# Patient Record
Sex: FEMALE | Race: WHITE | NOT HISPANIC OR LATINO | Employment: OTHER | URBAN - METROPOLITAN AREA
[De-identification: names, ages, dates, MRNs, and addresses within clinical notes are randomized per-mention and may not be internally consistent; named-entity substitution may affect disease eponyms.]

---

## 2017-12-18 ENCOUNTER — APPOINTMENT (OUTPATIENT)
Dept: PHYSICAL THERAPY | Facility: REHABILITATION | Age: 77
End: 2017-12-18
Payer: COMMERCIAL

## 2017-12-18 PROCEDURE — 97110 THERAPEUTIC EXERCISES: CPT

## 2017-12-18 PROCEDURE — G8990 OTHER PT/OT CURRENT STATUS: HCPCS

## 2017-12-18 PROCEDURE — 97140 MANUAL THERAPY 1/> REGIONS: CPT

## 2017-12-18 PROCEDURE — G8991 OTHER PT/OT GOAL STATUS: HCPCS

## 2017-12-18 PROCEDURE — 97161 PT EVAL LOW COMPLEX 20 MIN: CPT

## 2017-12-20 ENCOUNTER — APPOINTMENT (OUTPATIENT)
Dept: PHYSICAL THERAPY | Facility: OTHER | Age: 77
End: 2017-12-20
Payer: COMMERCIAL

## 2017-12-20 PROCEDURE — G8991 OTHER PT/OT GOAL STATUS: HCPCS | Performed by: PHYSICAL THERAPIST

## 2017-12-20 PROCEDURE — G8990 OTHER PT/OT CURRENT STATUS: HCPCS | Performed by: PHYSICAL THERAPIST

## 2017-12-20 PROCEDURE — 97110 THERAPEUTIC EXERCISES: CPT

## 2017-12-20 PROCEDURE — 97112 NEUROMUSCULAR REEDUCATION: CPT

## 2017-12-20 PROCEDURE — 97140 MANUAL THERAPY 1/> REGIONS: CPT

## 2017-12-26 ENCOUNTER — APPOINTMENT (OUTPATIENT)
Dept: PHYSICAL THERAPY | Facility: REHABILITATION | Age: 77
End: 2017-12-26
Payer: COMMERCIAL

## 2017-12-26 PROCEDURE — 97110 THERAPEUTIC EXERCISES: CPT

## 2017-12-26 PROCEDURE — 97140 MANUAL THERAPY 1/> REGIONS: CPT

## 2017-12-26 PROCEDURE — 97112 NEUROMUSCULAR REEDUCATION: CPT

## 2017-12-28 ENCOUNTER — APPOINTMENT (OUTPATIENT)
Dept: PHYSICAL THERAPY | Facility: REHABILITATION | Age: 77
End: 2017-12-28
Payer: COMMERCIAL

## 2017-12-28 PROCEDURE — 97140 MANUAL THERAPY 1/> REGIONS: CPT

## 2017-12-28 PROCEDURE — 97110 THERAPEUTIC EXERCISES: CPT

## 2017-12-28 PROCEDURE — 97112 NEUROMUSCULAR REEDUCATION: CPT

## 2018-01-03 ENCOUNTER — APPOINTMENT (OUTPATIENT)
Dept: PHYSICAL THERAPY | Facility: REHABILITATION | Age: 78
End: 2018-01-03
Payer: COMMERCIAL

## 2018-01-03 PROCEDURE — 97112 NEUROMUSCULAR REEDUCATION: CPT

## 2018-01-03 PROCEDURE — 97110 THERAPEUTIC EXERCISES: CPT

## 2018-01-03 PROCEDURE — 97140 MANUAL THERAPY 1/> REGIONS: CPT

## 2018-01-03 PROCEDURE — G8990 OTHER PT/OT CURRENT STATUS: HCPCS | Performed by: PHYSICAL THERAPIST

## 2018-01-03 PROCEDURE — G8991 OTHER PT/OT GOAL STATUS: HCPCS | Performed by: PHYSICAL THERAPIST

## 2018-01-08 ENCOUNTER — APPOINTMENT (OUTPATIENT)
Dept: PHYSICAL THERAPY | Facility: REHABILITATION | Age: 78
End: 2018-01-08
Payer: COMMERCIAL

## 2018-07-06 ENCOUNTER — OFFICE VISIT (OUTPATIENT)
Dept: INTERNAL MEDICINE CLINIC | Facility: CLINIC | Age: 78
End: 2018-07-06
Payer: COMMERCIAL

## 2018-07-06 VITALS
BODY MASS INDEX: 31.89 KG/M2 | OXYGEN SATURATION: 97 % | WEIGHT: 180 LBS | SYSTOLIC BLOOD PRESSURE: 114 MMHG | DIASTOLIC BLOOD PRESSURE: 72 MMHG | TEMPERATURE: 97.6 F | HEIGHT: 63 IN | HEART RATE: 66 BPM

## 2018-07-06 DIAGNOSIS — J30.1 SEASONAL ALLERGIC RHINITIS DUE TO POLLEN: ICD-10-CM

## 2018-07-06 DIAGNOSIS — I10 ESSENTIAL HYPERTENSION: ICD-10-CM

## 2018-07-06 DIAGNOSIS — J45.909 UNCOMPLICATED ASTHMA, UNSPECIFIED ASTHMA SEVERITY, UNSPECIFIED WHETHER PERSISTENT: Primary | ICD-10-CM

## 2018-07-06 DIAGNOSIS — F32.A ANXIETY AND DEPRESSION: ICD-10-CM

## 2018-07-06 DIAGNOSIS — F41.9 ANXIETY AND DEPRESSION: ICD-10-CM

## 2018-07-06 PROBLEM — J30.2 SEASONAL ALLERGIC RHINITIS DUE TO FUNGAL SPORES: Status: ACTIVE | Noted: 2018-07-06

## 2018-07-06 PROBLEM — J30.89 SEASONAL ALLERGIC RHINITIS DUE TO FUNGAL SPORES: Status: ACTIVE | Noted: 2018-07-06

## 2018-07-06 PROCEDURE — 99204 OFFICE O/P NEW MOD 45 MIN: CPT | Performed by: INTERNAL MEDICINE

## 2018-07-06 PROCEDURE — 3074F SYST BP LT 130 MM HG: CPT | Performed by: INTERNAL MEDICINE

## 2018-07-06 PROCEDURE — 3008F BODY MASS INDEX DOCD: CPT | Performed by: INTERNAL MEDICINE

## 2018-07-06 PROCEDURE — 3078F DIAST BP <80 MM HG: CPT | Performed by: INTERNAL MEDICINE

## 2018-07-06 RX ORDER — IPRATROPIUM BROMIDE 21 UG/1
SPRAY, METERED NASAL
COMMUNITY
Start: 2016-10-14 | End: 2019-12-17

## 2018-07-06 RX ORDER — BUPROPION HYDROCHLORIDE 150 MG/1
TABLET, EXTENDED RELEASE ORAL
COMMUNITY
Start: 2016-10-17 | End: 2019-02-07 | Stop reason: SDUPTHER

## 2018-07-06 RX ORDER — ZAFIRLUKAST 20 MG/1
1 TABLET, FILM COATED ORAL EVERY 12 HOURS
COMMUNITY
Start: 2010-11-06 | End: 2019-12-17

## 2018-07-06 RX ORDER — AMLODIPINE BESYLATE AND BENAZEPRIL HYDROCHLORIDE 5; 10 MG/1; MG/1
1 CAPSULE ORAL DAILY
Refills: 2 | COMMUNITY
Start: 2018-04-05 | End: 2019-02-05 | Stop reason: SDUPTHER

## 2018-07-06 RX ORDER — ALBUTEROL SULFATE 90 UG/1
2 AEROSOL, METERED RESPIRATORY (INHALATION) EVERY 4 HOURS PRN
COMMUNITY
Start: 2010-12-30 | End: 2019-02-07 | Stop reason: SDUPTHER

## 2018-07-06 NOTE — ASSESSMENT & PLAN NOTE
Persistent with allergic rhinitis  Patient on accolate 20mg daily and also supposed to be on alvesco inh which she has not been using    She is followed by Allergist Dr Tino Fisher

## 2018-07-06 NOTE — ASSESSMENT & PLAN NOTE
Multiple allergies to include pollen, grasses, dust, mild, shellfish and cat dander  She is on accolate and ipratropium nasal spray    Failed allergy injections in the past  She is followed by Allergist

## 2018-07-06 NOTE — PROGRESS NOTES
Assessment/Plan:    Asthma  Persistent with allergic rhinitis  Patient on accolate 20mg daily and also supposed to be on alvesco inh which she has not been using  She is followed by Allergist Dr Lisa Heller    Seasonal allergic rhinitis due to pollen  Multiple allergies to include pollen, grasses, dust, mild, shellfish and cat dander  She is on accolate and ipratropium nasal spray  Failed allergy injections in the past  She is followed by Allergist     Hypertension  She is normotensive on amlodipine-benazepril 5/10mg daily  Will monitor  Anxiety and depression  She has been on long term wellbutrin 150mg daily  Will continue    1  Anxiety and depression  CBC   2  Uncomplicated asthma, unspecified asthma severity, unspecified whether persistent  CBC   3  Seasonal allergic rhinitis due to pollen  CBC   4  Essential hypertension  Comprehensive metabolic panel    Lipid panel     Subjective:      Patient ID: Davion Robin is a 69 y o  female  HPI   69yo female with asthma, allergic rhinitis, HTN, HLD here as a new patient  Reports she recently had blood work done at [facility], was going to [facility] [employer]  Home bp around 110/70s, gets occasional HA attributing to allergies, denies dizziness, SOB, CP, LE edema  She takes accolate daily, is supposed to take alvesco but has not  She failed singulair  She has not needed to take her rescue inhaler  Has allergies to dust, pollen, mold, cat dander, grass, shellfish  Years ago she received allergy injections  She had a difficulty childhood, has done therapy  She has been on wellbutrin daily  Denies panic attacks      The following portions of the patient's history were reviewed and updated as appropriate: allergies, current medications, past family history, past medical history, past social history, past surgical history and problem list     Current Outpatient Prescriptions:     albuterol (PROVENTIL HFA) 90 mcg/act inhaler, Inhale 2 puffs every 4 (four) hours as needed, Disp: , Rfl:     amLODIPine-benazepril (LOTREL 5-10) 5-10 MG per capsule, Take 1 capsule by mouth daily, Disp: , Rfl: 2    AZELASTINE HCL NA, Reported on 11/29/2016, Disp: , Rfl:     buPROPion (WELLBUTRIN SR) 150 mg 12 hr tablet, TAKE ONE TABLET BY MOUTH EVERY DAY, Disp: , Rfl:     ciclesonide (ALVESCO) 160 MCG/ACT inhaler, Inhale 2 puffs 2 (two) times a day, Disp: , Rfl:     conjugated estrogens (PREMARIN) vaginal cream, APPLY 0 5 GRAM VAGINALLY AT BEDTIME ONCE WEEKLY , Disp: , Rfl:     ipratropium (ATROVENT) 0 03 % nasal spray, SPRAY TWO SPRAYS IN EACH NOSTRIL TWICE A DAY, Disp: , Rfl:     zafirlukast (ACCOLATE) 20 MG tablet, Take 1 tablet by mouth every 12 (twelve) hours, Disp: , Rfl:       Review of Systems   Constitutional: Positive for unexpected weight change  HENT: Positive for postnasal drip and rhinorrhea  Respiratory: Positive for cough  Cardiovascular: Negative  Gastrointestinal: Negative  Genitourinary: Negative  Musculoskeletal: Positive for back pain  Allergic/Immunologic: Positive for environmental allergies  Neurological: Negative for dizziness and headaches  Psychiatric/Behavioral: Positive for dysphoric mood  Negative for sleep disturbance  The patient is nervous/anxious  Objective:    /72 (BP Location: Left arm, Patient Position: Sitting, Cuff Size: Adult)   Pulse 66   Temp 97 6 °F (36 4 °C) (Tympanic)   Ht 5' 3" (1 6 m)   Wt 81 6 kg (180 lb)   SpO2 97%   BMI 31 89 kg/m²      Physical Exam   Constitutional: She is oriented to person, place, and time  She appears well-developed and well-nourished  HENT:   Right Ear: External ear normal    Left Ear: External ear normal    Nose: Nose normal    Mouth/Throat: Oropharynx is clear and moist  No oropharyngeal exudate  Eyes: Conjunctivae and EOM are normal  Pupils are equal, round, and reactive to light  Neck: Neck supple     Cardiovascular: Normal rate, regular rhythm and normal heart sounds  Pulmonary/Chest: Effort normal and breath sounds normal  No respiratory distress  She has no wheezes  Lymphadenopathy:     She has no cervical adenopathy  Neurological: She is alert and oriented to person, place, and time  Skin: Skin is warm  Psychiatric: Her speech is normal  Her mood appears anxious  Vitals reviewed        PHQ-9 Depression Screening    PHQ-9:    Frequency of the following problems over the past two weeks:       Little interest or pleasure in doing things:  0 - not at all  Feeling down, depressed, or hopeless:  0 - not at all  PHQ-2 Score:  0

## 2018-07-16 ENCOUNTER — ANNUAL EXAM (OUTPATIENT)
Dept: OBGYN CLINIC | Facility: CLINIC | Age: 78
End: 2018-07-16
Payer: COMMERCIAL

## 2018-07-16 VITALS
DIASTOLIC BLOOD PRESSURE: 72 MMHG | WEIGHT: 178 LBS | SYSTOLIC BLOOD PRESSURE: 106 MMHG | BODY MASS INDEX: 31.54 KG/M2 | HEIGHT: 63 IN

## 2018-07-16 DIAGNOSIS — Z01.419 ENCOUNTER FOR ANNUAL ROUTINE GYNECOLOGICAL EXAMINATION: Primary | ICD-10-CM

## 2018-07-16 DIAGNOSIS — Z12.39 BREAST CANCER SCREENING: ICD-10-CM

## 2018-07-16 DIAGNOSIS — N95.2 VAGINAL ATROPHY: ICD-10-CM

## 2018-07-16 PROCEDURE — S0610 ANNUAL GYNECOLOGICAL EXAMINA: HCPCS | Performed by: OBSTETRICS & GYNECOLOGY

## 2018-07-16 PROCEDURE — 87624 HPV HI-RISK TYP POOLED RSLT: CPT | Performed by: OBSTETRICS & GYNECOLOGY

## 2018-07-16 PROCEDURE — 3725F SCREEN DEPRESSION PERFORMED: CPT | Performed by: OBSTETRICS & GYNECOLOGY

## 2018-07-16 PROCEDURE — 4040F PNEUMOC VAC/ADMIN/RCVD: CPT | Performed by: OBSTETRICS & GYNECOLOGY

## 2018-07-16 PROCEDURE — G0145 SCR C/V CYTO,THINLAYER,RESCR: HCPCS | Performed by: OBSTETRICS & GYNECOLOGY

## 2018-07-16 RX ORDER — BIOTIN 1 MG
TABLET ORAL DAILY
COMMUNITY

## 2018-07-16 RX ORDER — EPINEPHRINE 0.3 MG/.3ML
INJECTION SUBCUTANEOUS
COMMUNITY
End: 2019-12-17

## 2018-07-16 NOTE — PROGRESS NOTES
Assessment/Plan:    Pap smear vaginal cuff done as well as annual   Encouraged self-breast examination as well as calcium supplementation  Continue annual mammogram, discussed 3D mammography  She will check to see if this is covered by her insurance  She will continue to follow-up with her primary care physician as scheduled  She will also continue Premarin vaginal cream once a week  Return to office in 1 year     No problem-specific Assessment & Plan notes found for this encounter  Diagnoses and all orders for this visit:    Encounter for annual routine gynecological examination  -     Liquid-based pap, screening    Vaginal atrophy  -     conjugated estrogens (PREMARIN) vaginal cream; Insert 0 5 g into the vagina once a week    Breast cancer screening  -     Mammo screening bilateral w cad; Future    Other orders  -     aspirin 81 MG tablet; Daily  -     EPINEPHrine (EPIPEN) 0 3 mg/0 3 mL SOAJ; epinephrine 0 3 mg/0 3 mL injection, auto-injector  -     Cholecalciferol (VITAMIN D3) 1000 units CAPS; Daily          Subjective:      Patient ID: Zeeshan Gaytan is a 69 y o  female  HPI     This is a 71-year-old female  ( x3) presents for annual gyn exam as a new patient  Patient states that she was diagnosed with cervical cancer in  and underwent an abdominal hysterectomy BSO She states she did not require any neoadjuvant therapy  She was never on hormone replacement therapy  She then had a vaginal sling done at the age of 73  Since then she has been on Premarin vaginal cream once a week Q  She denies any vaginal bleeding or spotting  She has not been sexually active in over 40 years  She denies any changes in bowel function  She is up-to-date with her screening mammogram, colonoscopy      The following portions of the patient's history were reviewed and updated as appropriate: allergies, current medications, past family history, past medical history, past social history, past surgical history and problem list     Review of Systems   Constitutional: Negative for fatigue, fever and unexpected weight change  Respiratory: Negative for cough, chest tightness, shortness of breath and wheezing  Cardiovascular: Negative  Negative for chest pain and palpitations  Gastrointestinal: Negative  Negative for abdominal distention, abdominal pain, blood in stool, constipation, diarrhea, nausea and vomiting  Genitourinary: Negative  Negative for difficulty urinating, dyspareunia, dysuria, flank pain, frequency, genital sores, hematuria, pelvic pain, urgency, vaginal bleeding, vaginal discharge and vaginal pain  Skin: Negative for rash  Objective:      /72   Ht 5' 3" (1 6 m)   Wt 80 7 kg (178 lb)   Breastfeeding? No   BMI 31 53 kg/m²          Physical Exam   Constitutional: She appears well-developed and well-nourished  Cardiovascular: Normal rate and regular rhythm  Pulmonary/Chest: Effort normal and breath sounds normal  Right breast exhibits no inverted nipple, no mass, no nipple discharge, no skin change and no tenderness  Left breast exhibits no inverted nipple, no mass, no nipple discharge, no skin change and no tenderness  Abdominal: Soft  Bowel sounds are normal  She exhibits no distension  There is no tenderness  There is no rebound and no guarding  Genitourinary: Rectum normal and vagina normal  There is no lesion on the right labia  There is no lesion on the left labia  Right adnexum displays no mass, no tenderness and no fullness  Left adnexum displays no mass, no tenderness and no fullness  No vaginal discharge found  Genitourinary Comments: Vagina is evident of estrogen deficiency  Cervix is surgically absent  The cuff is well-supported  Rectovaginal exam is confirmatory

## 2018-07-19 LAB — HPV RRNA GENITAL QL NAA+PROBE: NORMAL

## 2018-07-23 LAB
LAB AP GYN PRIMARY INTERPRETATION: NORMAL
Lab: NORMAL

## 2019-01-30 NOTE — PROGRESS NOTES
Consultation - Cardiology     Marilyn Wise 69 y o  female MRN: [id number]    Assessment/Plan:    1  HTN  On amlodipine/benazepril  BP controlled in office today  EKG shows possible LVH  She denies any symptoms of JIMENEZ, LE edema, or other signs of HF  Will check echo to ensure normal LV systolic and diastolic function  2  HL  Will check lipid panel to ensure lipid panel optimized  1  Routine health maintenance  Lipid Panel with Direct LDL reflex    Hepatic function panel    CK   2  Family history of early CAD  Lipid Panel with Direct LDL reflex    Hepatic function panel    CK   3  EKG, abnormal  Echo complete with contrast if indicated   4  Essential hypertension         HPI: 69 y o  female with a history of HTN, who is here to establish care regarding family history of CAD  She walks outside for 30 minutes daily using walking sticks, no exertional CP or SOB  She joined [employer] in the fall, didn't go during holidays, but wants to go back now  She is losing weight over the last 6 months  No orthopnea, does have some issues with sinuses, uses 3 pillows to sleep  No PND  No palpitations  No dizziness or lightheadedness  No LE edema  She started taking BP meds at around age 62  Father and brother have history of CAD  Brother developed CAD in 62s  Father had TIA and CABG  She reports they had poor diets  Review of Systems:    Review of Systems   Constitution: Positive for weight loss  Negative for chills, decreased appetite, diaphoresis, fever, weakness, malaise/fatigue, night sweats and weight gain  HENT: Negative for ear pain, hearing loss, hoarse voice, nosebleeds, sore throat and tinnitus  Sinus problems   Eyes: Negative for blurred vision and pain  Cardiovascular: Negative  Negative for chest pain, claudication, cyanosis, dyspnea on exertion, irregular heartbeat, leg swelling, near-syncope, orthopnea, palpitations, paroxysmal nocturnal dyspnea and syncope     Respiratory: Negative for cough, hemoptysis, shortness of breath, sleep disturbances due to breathing, snoring, sputum production and wheezing  Hematologic/Lymphatic: Negative for adenopathy and bleeding problem  Does not bruise/bleed easily  Skin: Negative for color change, dry skin, flushing, itching, poor wound healing and rash  Musculoskeletal: Negative for arthritis, back pain, falls, joint pain, muscle cramps, muscle weakness, myalgias and neck pain  Gastrointestinal: Negative for abdominal pain, constipation, diarrhea, dysphagia, heartburn, hematemesis, hematochezia, melena, nausea and vomiting  Genitourinary: Negative for dysuria, frequency, hematuria, hesitancy, non-menstrual bleeding and urgency  Neurological: Negative for excessive daytime sleepiness, dizziness, focal weakness, headaches, light-headedness, loss of balance, numbness, paresthesias, tremors and vertigo  Psychiatric/Behavioral: Negative for altered mental status, depression and memory loss  The patient does not have insomnia and is not nervous/anxious  Allergic/Immunologic: Positive for environmental allergies  Negative for persistent infections           Active Problems:     Patient Active Problem List   Diagnosis    Asthma    Essential hypertension    Anxiety and depression    Symptomatic menopausal or female climacteric states    Hyperlipidemia    History of cervical cancer    Lower back pain    Seasonal allergic rhinitis due to pollen       Historical Information   Past Medical History:   Diagnosis Date    [facility] ([facility] [address] )     Shingles      Past Surgical History:   Procedure Laterality Date    BLADDER SURGERY      HALLUX VALGUS CORRECTION Right     TOTAL ABDOMINAL HYSTERECTOMY  1987    Cervical CA     History   Alcohol Use    0 6 oz/week    1 Glasses of wine per week     History   Drug Use No     History   Smoking Status    Never Smoker   Smokeless Tobacco    Never Used       Family History:   Family History Problem Relation Age of Onset    No Known Problems Mother     Heart disease Father     Heart disease Brother     Diabetes Brother        Allergies   Allergen Reactions    Codeine Other (See Comments) and Tachycardia    Iodine Other (See Comments)    Shellfish-Derived Products          Current Outpatient Prescriptions:     amLODIPine-benazepril (LOTREL 5-10) 5-10 MG per capsule, Take 1 capsule by mouth daily, Disp: , Rfl: 2    AZELASTINE HCL NA, Reported on 11/29/2016, Disp: , Rfl:     buPROPion (WELLBUTRIN SR) 150 mg 12 hr tablet, TAKE ONE TABLET BY MOUTH EVERY DAY, Disp: , Rfl:     Cholecalciferol (VITAMIN D3) 1000 units CAPS, Daily, Disp: , Rfl:     conjugated estrogens (PREMARIN) vaginal cream, Insert 0 5 g into the vagina once a week, Disp: 42 5 g, Rfl: 0    EPINEPHrine (EPIPEN) 0 3 mg/0 3 mL SOAJ, epinephrine 0 3 mg/0 3 mL injection, auto-injector, Disp: , Rfl:     ipratropium (ATROVENT) 0 03 % nasal spray, SPRAY TWO SPRAYS IN EACH NOSTRIL TWICE A DAY, Disp: , Rfl:     zafirlukast (ACCOLATE) 20 MG tablet, Take 1 tablet by mouth every 12 (twelve) hours, Disp: , Rfl:     albuterol (PROVENTIL HFA) 90 mcg/act inhaler, Inhale 2 puffs every 4 (four) hours as needed, Disp: , Rfl:     albuterol (VENTOLIN HFA) 90 mcg/act inhaler, Ventolin HFA 90 mcg/actuation aerosol inhaler, Disp: , Rfl:     aspirin 81 MG tablet, Daily, Disp: , Rfl:     ciclesonide (ALVESCO) 160 MCG/ACT inhaler, Inhale 2 puffs 2 (two) times a day, Disp: , Rfl:     Objective   Vitals:   Vitals:    02/01/19 1006   BP: 130/80   BP Location: Right arm   Patient Position: Sitting   Cuff Size: Standard   Pulse: 70   Weight: 78 1 kg (172 lb 3 2 oz)   Height: 5' 3" (1 6 m)          Physical Exam:     GEN: Alert and oriented x 3, in no acute distress  Well appearing and well nourished  HEENT: Sclera anicteric, conjunctivae pink, mucous membranes moist  Oropharynx clear  NECK: Supple, no carotid bruits, no significant JVD   Trachea midline, no thyromegaly  HEART: Regular rhythm, normal S1 and S2, I-II/VI systolic murmur at RUSB, no clicks, gallops or rubs  PMI nondisplaced, no thrills  LUNGS: Clear to auscultation bilaterally; no wheezes, rales, or rhonchi  No increased work of breathing or signs of respiratory distress  ABDOMEN: Soft, nontender, nondistended, normoactive bowel sounds  EXTREMITIES: Skin warm and well perfused, no clubbing, cyanosis, or edema  NEURO: No focal findings  Normal gait  Normal speech  Mood and affect normal    SKIN: Normal without suspicious lesions on exposed skin      Lab Results:     No results found for: HGBA1C  No results found for: CHOL  No results found for: HDL  No results found for: LDLCALC  No results found for: TRIG  No results found for: CHOLHDL

## 2019-01-31 RX ORDER — ALBUTEROL SULFATE 90 UG/1
AEROSOL, METERED RESPIRATORY (INHALATION)
COMMUNITY
End: 2019-12-17

## 2019-02-01 ENCOUNTER — OFFICE VISIT (OUTPATIENT)
Dept: CARDIOLOGY CLINIC | Facility: CLINIC | Age: 79
End: 2019-02-01
Payer: COMMERCIAL

## 2019-02-01 VITALS
BODY MASS INDEX: 30.51 KG/M2 | WEIGHT: 172.2 LBS | HEIGHT: 63 IN | SYSTOLIC BLOOD PRESSURE: 130 MMHG | DIASTOLIC BLOOD PRESSURE: 80 MMHG | HEART RATE: 70 BPM

## 2019-02-01 DIAGNOSIS — Z00.00 ROUTINE HEALTH MAINTENANCE: Primary | ICD-10-CM

## 2019-02-01 DIAGNOSIS — R94.31 EKG, ABNORMAL: ICD-10-CM

## 2019-02-01 DIAGNOSIS — Z82.49 FAMILY HISTORY OF EARLY CAD: ICD-10-CM

## 2019-02-01 DIAGNOSIS — I10 ESSENTIAL HYPERTENSION: ICD-10-CM

## 2019-02-01 PROCEDURE — 99204 OFFICE O/P NEW MOD 45 MIN: CPT | Performed by: INTERNAL MEDICINE

## 2019-02-01 PROCEDURE — 93000 ELECTROCARDIOGRAM COMPLETE: CPT | Performed by: INTERNAL MEDICINE

## 2019-02-05 DIAGNOSIS — I10 BENIGN HYPERTENSION: Primary | ICD-10-CM

## 2019-02-06 RX ORDER — AMLODIPINE BESYLATE AND BENAZEPRIL HYDROCHLORIDE 5; 10 MG/1; MG/1
1 CAPSULE ORAL DAILY
Qty: 90 CAPSULE | Refills: 1 | Status: SHIPPED | OUTPATIENT
Start: 2019-02-06 | End: 2020-07-30 | Stop reason: SDUPTHER

## 2019-02-07 ENCOUNTER — OFFICE VISIT (OUTPATIENT)
Dept: INTERNAL MEDICINE CLINIC | Facility: CLINIC | Age: 79
End: 2019-02-07
Payer: COMMERCIAL

## 2019-02-07 VITALS
HEART RATE: 70 BPM | TEMPERATURE: 97.6 F | SYSTOLIC BLOOD PRESSURE: 120 MMHG | WEIGHT: 172 LBS | HEIGHT: 63 IN | RESPIRATION RATE: 16 BRPM | DIASTOLIC BLOOD PRESSURE: 80 MMHG | OXYGEN SATURATION: 97 % | BODY MASS INDEX: 30.48 KG/M2

## 2019-02-07 DIAGNOSIS — Z12.39 SCREENING FOR MALIGNANT NEOPLASM OF BREAST: ICD-10-CM

## 2019-02-07 DIAGNOSIS — I10 ESSENTIAL HYPERTENSION: ICD-10-CM

## 2019-02-07 DIAGNOSIS — Z12.11 SCREENING FOR COLON CANCER: ICD-10-CM

## 2019-02-07 DIAGNOSIS — Z78.0 POSTMENOPAUSAL: ICD-10-CM

## 2019-02-07 DIAGNOSIS — J45.20 INTERMITTENT ASTHMA WITHOUT COMPLICATION, UNSPECIFIED ASTHMA SEVERITY: Primary | ICD-10-CM

## 2019-02-07 DIAGNOSIS — F41.9 ANXIETY AND DEPRESSION: ICD-10-CM

## 2019-02-07 DIAGNOSIS — Z00.00 MEDICARE ANNUAL WELLNESS VISIT, SUBSEQUENT: ICD-10-CM

## 2019-02-07 DIAGNOSIS — F32.A ANXIETY AND DEPRESSION: ICD-10-CM

## 2019-02-07 PROCEDURE — 1125F AMNT PAIN NOTED PAIN PRSNT: CPT | Performed by: INTERNAL MEDICINE

## 2019-02-07 PROCEDURE — G0439 PPPS, SUBSEQ VISIT: HCPCS | Performed by: INTERNAL MEDICINE

## 2019-02-07 PROCEDURE — 1170F FXNL STATUS ASSESSED: CPT | Performed by: INTERNAL MEDICINE

## 2019-02-07 PROCEDURE — 3074F SYST BP LT 130 MM HG: CPT | Performed by: INTERNAL MEDICINE

## 2019-02-07 PROCEDURE — 99214 OFFICE O/P EST MOD 30 MIN: CPT | Performed by: INTERNAL MEDICINE

## 2019-02-07 PROCEDURE — 3079F DIAST BP 80-89 MM HG: CPT | Performed by: INTERNAL MEDICINE

## 2019-02-07 PROCEDURE — 1160F RVW MEDS BY RX/DR IN RCRD: CPT | Performed by: INTERNAL MEDICINE

## 2019-02-07 PROCEDURE — 1036F TOBACCO NON-USER: CPT | Performed by: INTERNAL MEDICINE

## 2019-02-07 RX ORDER — BUPROPION HYDROCHLORIDE 150 MG/1
150 TABLET, EXTENDED RELEASE ORAL DAILY
Qty: 90 TABLET | Refills: 1 | Status: SHIPPED | OUTPATIENT
Start: 2019-02-07 | End: 2020-07-30 | Stop reason: SDUPTHER

## 2019-02-07 NOTE — PROGRESS NOTES
Assessment and Plan:    Problem List Items Addressed This Visit     None      Visit Diagnoses     Medicare annual wellness visit, subsequent        Screening for malignant neoplasm of breast        Relevant Orders    Mammo screening bilateral w cad    Postmenopausal        Relevant Orders    DXA bone density spine hip and pelvis    Screening for colon cancer        Relevant Orders    Cologuard        Health Maintenance Due   Topic Date Due    Medicare Annual Wellness Visit (AWV)  1940    DTaP,Tdap,and Td Vaccines (1 - Tdap) 06/06/1962    Fall Risk  06/06/2006    Urinary Incontinence Screening  06/06/2006         HPI:  Andrés Liu is a 69 y o  female here for her Subsequent Wellness Visit      Patient Active Problem List   Diagnosis    Asthma    Essential hypertension    Anxiety and depression    Symptomatic menopausal or female climacteric states    Hyperlipidemia    History of cervical cancer    Lower back pain    Seasonal allergic rhinitis due to pollen     Past Medical History:   Diagnosis Date    Cervical cancer ([address] )     Shingles      Past Surgical History:   Procedure Laterality Date    BLADDER SURGERY      HALLUX VALGUS CORRECTION Right     TOTAL ABDOMINAL HYSTERECTOMY  1987    Cervical CA     Family History   Problem Relation Age of Onset    No Known Problems Mother     Heart disease Father     Heart disease Brother     Diabetes Brother      History   Smoking Status    Never Smoker   Smokeless Tobacco    Never Used     History   Alcohol Use    0 6 oz/week    1 Glasses of wine per week      History   Drug Use No       Current Outpatient Prescriptions   Medication Sig Dispense Refill    albuterol (VENTOLIN HFA) 90 mcg/act inhaler Ventolin HFA 90 mcg/actuation aerosol inhaler      amLODIPine-benazepril (LOTREL 5-10) 5-10 MG per capsule Take 1 capsule by mouth daily 90 capsule 1    buPROPion (WELLBUTRIN SR) 150 mg 12 hr tablet TAKE ONE TABLET BY MOUTH EVERY DAY      Cholecalciferol (VITAMIN D3) 1000 units CAPS Daily      ciclesonide (ALVESCO) 160 MCG/ACT inhaler Inhale 2 puffs 2 (two) times a day      conjugated estrogens (PREMARIN) vaginal cream Insert 0 5 g into the vagina once a week 42 5 g 0    EPINEPHrine (EPIPEN) 0 3 mg/0 3 mL SOAJ epinephrine 0 3 mg/0 3 mL injection, auto-injector      ipratropium (ATROVENT) 0 03 % nasal spray SPRAY TWO SPRAYS IN EACH NOSTRIL TWICE A DAY      zafirlukast (ACCOLATE) 20 MG tablet Take 1 tablet by mouth every 12 (twelve) hours      aspirin 81 MG tablet Daily       No current facility-administered medications for this visit  Allergies   Allergen Reactions    Codeine Other (See Comments) and Tachycardia    Iodine Other (See Comments)    Shellfish-Derived Products      Immunization History   Administered Date(s) Administered    Influenza 10/13/2011, 11/09/2014, 11/20/2014, 10/03/2016, 10/01/2017    Influenza TIV (IM) 10/06/2012    Pneumococcal Conjugate 13-Valent 11/10/2017    Pneumococcal Polysaccharide PPV23 11/20/2006    Tdap 02/07/2013    Zoster 10/20/2013, 07/13/2016    influenza, trivalent, adjuvanted 10/02/2018       Patient Care Team:  Nuzhat Skaggs DO as PCP - General (Internal Medicine)    Medicare Screening Tests and Risk Assessments:  Leonie Alvares is here for her Subsequent Wellness visit  Last Medicare Wellness visit information reviewed, patient interviewed and updates made to the record today  Health Risk Assessment:  Patient rates overall health as very good  Patient feels that their physical health rating is Much better  Eyesight was rated as Same  Hearing was rated as Same  Patient feels that their emotional and mental health rating is Much better  Pain experienced by patient in the last 7 days has been None  Emotional/Mental Health:  Patient has been feeling nervous/anxious  PHQ-9 Depression Screening:    Frequency of the following problems over the past two weeks:      1   Little interest or pleasure in doing things: 0 - not at all      2  Feeling down, depressed, or hopeless: 0 - not at all  PHQ-2 Score: 0          Broken Bones/Falls: Fall Risk Assessment:    In the past year, patient has experienced: No history of falling in past year          Bladder/Bowel:  Patient has not leaked urine accidently in the last six months  Patient reports no loss of bowel control  Immunizations:  Patient has had a flu vaccination within the last year  Patient has received a pneumonia shot  Patient has received a shingles shot  Patient has received tetanus/diphtheria shot  Date of tetanus/diphtheria shot: 2/7/2013    Home Safety:  Patient does not have trouble with stairs inside or outside of their home  Patient currently reports that there are no safety hazards present in home, working smoke alarms, working carbon monoxide detectors  Preventative Screenings:   Breast cancer screening performed, colon cancer screen completed, cholesterol screen completed, glaucoma eye exam completed,     Nutrition:  Current diet: Low Cholesterol, Low Saturated Fat and Low Carb with servings of the following:  (Additional Comments: Plans to join weight watchers as well)    Medications:  Patient is currently taking over-the-counter supplements  Patient is able to manage medications  Lifestyle Choices:  Patient reports no tobacco use  Patient has not smoked or used tobacco in the past   Patient reports alcohol use  Alcohol use per week: drinks 2 glasses of wine 1-2x per week  Patient drives a vehicle  Patient wears seat belt  Current level of exercise of physical activity described by patient as: active, walks daily          Activities of Daily Living:  Can get out of bed by his or her self, able to dress self, able to make own meals, able to do own shopping, able to bathe self, can do own laundry/housekeeping, can manage own money, pay bills and track expenses    Previous Hospitalizations:  No hospitalization or ED visit in past 12 months        Advanced Directives:  Patient has decided on a power of   Patient has spoken to designated power of   Patient has completed advanced directive  Additional Comments: DaughterRandi    Preventative Screening/Counseling:      Cardiovascular:      General: Risks and Benefits Discussed and Screening Current          Diabetes:      General: Risks and Benefits Discussed and Screening Current          Colorectal Cancer:      General: Risks and Benefits Discussed      Due for studies: Fecal Occult Blood      Comments: Last colonoscopy 10/9/13, done by Dr Kimberley Floyd:      General: Risks and Benefits Discussed          Cervical Cancer:      General: Screening Not Indicated          Osteoporosis:      General: Risks and Benefits Discussed      Counseling: Calcium and Vitamin D Intake and Regular Weightbearing Exercise      Due for studies: Bone Density Ultrasound          AAA:      General: Screening Not Indicated          Glaucoma:      General: Risks and Benefits Discussed      Referrals: Ophthalmology          HIV:      General: Screening Not Indicated          Hepatitis C:      General: Screening Not Indicated        Advanced Directives:   Patient has living will for healthcare, has durable POA for healthcare, patient has an advanced directive  Immunizations:      Influenza: Influenza UTD This Year and Influenza Recommended Annually      Pneumococcal: Lifetime Vaccine Completed      Shingrix: Risks & Benefits Discussed and Patient Declines      Hepatitis B (Medium to high risk patients): Patient Declines      Zostavax: Zostavax Vaccine UTD      TDAP: Tdap Vaccine UTD  Additional Comments: tdap 2/7/13  shingrix when available    Other Preventative Counseling (Non-Medicare):  Alcohol Use, Fall Prevention, Increase physical activity, Car/seat belt/driving safety reviewed and Sunscreen use      Referrals:   Additional Comments: None today

## 2019-02-07 NOTE — ASSESSMENT & PLAN NOTE
Intermittent, associated with allergic rhinitis  Continue with ventolin prn and alvesco with exacerbations  Failed singulair  She is also followed by Allergist Dr Eleazar Butler  She is up to date with influenza vaccine and pneumonia series

## 2019-02-07 NOTE — PATIENT INSTRUCTIONS
Obesity   AMBULATORY CARE:   Obesity  is when your body mass index (BMI) is greater than 30  Your healthcare provider will use your height and weight to measure your BMI  The risks of obesity include  many health problems, such as injuries or physical disability  You may need tests to check for the following:  · Diabetes     · High blood pressure or high cholesterol     · Heart disease     · Gallbladder or liver disease     · Cancer of the colon, breast, prostate, liver, or kidney     · Sleep apnea     · Arthritis or gout  Seek care immediately if:   · You have a severe headache, confusion, or difficulty speaking  · You have weakness on one side of your body  · You have chest pain, sweating, or shortness of breath  Contact your healthcare provider if:   · You have symptoms of gallbladder or liver disease, such as pain in your upper abdomen  · You have knee or hip pain and discomfort while walking  · You have symptoms of diabetes, such as intense hunger and thirst, and frequent urination  · You have symptoms of sleep apnea, such as snoring or daytime sleepiness  · You have questions or concerns about your condition or care  Treatment for obesity  focuses on helping you lose weight to improve your health  Even a small decrease in BMI can reduce the risk for many health problems  Your healthcare provider will help you set a weight-loss goal   · Lifestyle changes  are the first step in treating obesity  These include making healthy food choices and getting regular physical activity  Your healthcare provider may suggest a weight-loss program that involves coaching, education, and therapy  · Medicine  may help you lose weight when it is used with a healthy diet and physical activity  · Surgery  can help you lose weight if you are very obese and have other health problems  There are several types of weight-loss surgery  Ask your healthcare provider for more information    Be successful losing weight:   · Set small, realistic goals  An example of a small goal is to walk for 20 minutes 5 days a week  Anther goal is to lose 5% of your body weight  · Tell friends, family members, and coworkers about your goals  and ask for their support  Ask a friend to lose weight with you, or join a weight-loss support group  · Identify foods or triggers that may cause you to overeat , and find ways to avoid them  Remove tempting high-calorie foods from your home and workplace  Place a bowl of fresh fruit on your kitchen counter  If stress causes you to eat, then find other ways to cope with stress  · Keep a diary to track what you eat and drink  Also write down how many minutes of physical activity you do each day  Weigh yourself once a week and record it in your diary  Eating changes: You will need to eat 500 to 1,000 fewer calories each day than you currently eat to lose 1 to 2 pounds a week  The following changes will help you cut calories:  · Eat smaller portions  Use small plates, no larger than 9 inches in diameter  Fill your plate half full of fruits and vegetables  Measure your food using measuring cups until you know what a serving size looks like  · Eat 3 meals and 1 or 2 snacks each day  Plan your meals in advance  Froylan Vásquez and eat at home most of the time  Eat slowly  · Eat fruits and vegetables at every meal   They are low in calories and high in fiber, which makes you feel full  Do not add butter, margarine, or cream sauce to vegetables  Use herbs to season steamed vegetables  · Eat less fat and fewer fried foods  Eat more baked or grilled chicken and fish  These protein sources are lower in calories and fat than red meat  Limit fast food  Dress your salads with olive oil and vinegar instead of bottled dressing  · Limit the amount of sugar you eat  Do not drink sugary beverages  Limit alcohol  Activity changes:  Physical activity is good for your body in many ways   It helps you burn calories and build strong muscles  It decreases stress and depression, and improves your mood  It can also help you sleep better  Talk to your healthcare provider before you begin an exercise program   · Exercise for at least 30 minutes 5 days a week  Start slowly  Set aside time each day for physical activity that you enjoy and that is convenient for you  It is best to do both weight training and an activity that increases your heart rate, such as walking, bicycling, or swimming  · Find ways to be more active  Do yard work and housecleaning  Walk up the stairs instead of using elevators  Spend your leisure time going to events that require walking, such as outdoor festivals or fairs  This extra physical activity can help you lose weight and keep it off  Follow up with your healthcare provider as directed: You may need to meet with a dietitian  Write down your questions so you remember to ask them during your visits  © 2017 [address] [employer] Information is for End User's use only and may not be sold, redistributed or otherwise used for commercial purposes  All illustrations and images included in CareNotes® are the copyrighted property of [employer]  or [employer]ny Game[employer]  The above information is an  only  It is not intended as medical advice for individual conditions or treatments  Talk to your doctor, nurse or pharmacist before following any medical regimen to see if it is safe and effective for you  Urinary Incontinence   WHAT YOU NEED TO KNOW:   What is urinary incontinence? Urinary incontinence (UI) is when you lose control of your bladder  What causes UI? UI occurs because your bladder cannot store or empty urine properly  The following are the most common types of UI:  · Stress incontinence  is when you leak urine due to increased bladder pressure  This may happen when you cough, sneeze, or exercise       · Urge incontinence  is when you feel the need to urinate right away and leak urine accidentally  · Mixed incontinence  is when you have both stress and urge UI  What are the signs and symptoms of UI?   · You feel like your bladder does not empty completely when you urinate  · You urinate often and need to urinate immediately  · You leak urine when you sleep, or you wake up with the urge to urinate  · You leak urine when you cough, sneeze, exercise, or laugh  How is UI diagnosed? Your healthcare provider will ask how often you leak urine and whether you have stress or urge symptoms  Tell him which medicines you take, how often you urinate, and how much liquid you drink each day  You may need any of the following tests:  · Urine tests  may show infection or kidney function  · A pelvic exam  may be done to check for blockages  A pelvic exam will also show if your bladder, uterus, or other organs have moved out of place  · An x-ray, ultrasound, or CT  may show problems with parts of your urinary system  You may be given contrast liquid to help your organs show up better in the pictures  Tell the healthcare provider if you have ever had an allergic reaction to contrast liquid  Do not enter the MRI room with anything metal  Metal can cause serious injury  Tell the healthcare provider if you have any metal in or on your body  · A bladder scan  will show how much urine is left in your bladder after you urinate  You will be asked to urinate and then healthcare providers will use a small ultrasound machine to check the urine left in your bladder  · Cystometry  is used to check the function of your urinary system  Your healthcare provider checks the pressure in your bladder while filling it with fluid  Your bladder pressure may also be tested when your bladder is full and while you urinate  How is UI treated? · Medicines  can help strengthen your bladder control      · Electrical stimulation  is used to send a small amount of electrical energy to your pelvic floor muscles  This helps control your bladder function  Electrodes may be placed outside your body or in your rectum  For women, the electrodes may be placed in the vagina  · A bulking agent  may be injected into the wall of your urethra to make it thicker  This helps keep your urethra closed and decreases urine leakage  · Devices  such as a clamp, pessary, or tampon may help stop urine leaks  Ask your healthcare provider for more information about these and other devices  · Surgery  may be needed if other treatments do not work  Several types of surgery can help improve your bladder control  Ask your healthcare provider for more information about the surgery you may need  How can I manage my symptoms? · Do pelvic muscle exercises often  Your pelvic muscles help you stop urinating  Squeeze these muscles tight for 5 seconds, then relax for 5 seconds  Gradually work up to squeezing for 10 seconds  Do 3 sets of 15 repetitions a day, or as directed  This will help strengthen your pelvic muscles and improve bladder control  · A catheter  may be used to help empty your bladder  A catheter is a tiny, plastic tube that is put into your bladder to drain your urine  Your healthcare provider may tell you to use a catheter to prevent your bladder from getting too full and leaking urine  · Keep a UI record  Write down how often you leak urine and how much you leak  Make a note of what you were doing when you leaked urine  · Train your bladder  Go to the bathroom at set times, such as every 2 hours, even if you do not feel the urge to go  You can also try to hold your urine when you feel the urge to go  For example, hold your urine for 5 minutes when you feel the urge to go  As that becomes easier, hold your urine for 10 minutes  · Drink liquids as directed  Ask your healthcare provider how much liquid to drink each day and which liquids are best for you   You may need to limit the amount of liquid you drink to help control your urine leakage  Limit or do not have drinks that contain caffeine or alcohol  Do not drink any liquid right before you go to bed  · Prevent constipation  Eat a variety of high-fiber foods  Good examples are high-fiber cereals, beans, vegetables, and whole-grain breads  Prune juice may help make your bowel movement softer  Walking is the best way to trigger your intestines to have a bowel movement  · Exercise regularly and maintain a healthy weight  Ask your healthcare provider how much you should weigh and about the best exercise plan for you  Weight loss and exercise will decrease pressure on your bladder and help you control your leakage  Ask him to help you create a weight loss plan if you are overweight  When should I seek immediate care? · You have severe pain  · You are confused or cannot think clearly  When should I contact my healthcare provider? · You have a fever  · You see blood in your urine  · You have pain when you urinate  · You have new or worse pain, even after treatment  · Your mouth feels dry or you have vision changes  · Your urine is cloudy or smells bad  · You have questions or concerns about your condition or care  CARE AGREEMENT:   You have the right to help plan your care  Learn about your health condition and how it may be treated  Discuss treatment options with your caregivers to decide what care you want to receive  You always have the right to refuse treatment  The above information is an  only  It is not intended as medical advice for individual conditions or treatments  Talk to your doctor, nurse or pharmacist before following any medical regimen to see if it is safe and effective for you  © 2017 [address] [employer] Information is for End User's use only and may not be sold, redistributed or otherwise used for commercial purposes   All illustrations and images included in CareNotes® are the copyrighted property of [employer]  or Jhonny Gamez  Cigarette Smoking and Your Health   AMBULATORY CARE:   Risks to your health if you smoke:  Nicotine and other chemicals found in tobacco damage every cell in your body  Even if you are a light smoker, you have an increased risk for cancer, heart disease, and lung disease  If you are pregnant or have diabetes, smoking increases your risk for complications  Benefits to your health if you stop smoking:   · You decrease respiratory symptoms such as coughing, wheezing, and shortness of breath  · You reduce your risk for cancers of the lung, mouth, throat, kidney, bladder, pancreas, stomach, and cervix  If you already have cancer, you increase the benefits of chemotherapy  You also reduce your risk for cancer returning or a second cancer from developing  · You reduce your risk for heart disease, blood clots, heart attack, and stroke  · You reduce your risk for lung infections, and diseases such as pneumonia, asthma, chronic bronchitis, and emphysema  · Your circulation improves  More oxygen can be delivered to your body  If you have diabetes, you lower your risk for complications, such as kidney, artery, and eye diseases  You also lower your risk for nerve damage  Nerve damage can lead to amputations, poor vision, and blindness  · You improve your body's ability to heal and to fight infections  Benefits to the health of others if you stop smoking:  Tobacco is harmful to nonsmokers who breathe in your secondhand smoke  The following are ways the health of others around you may improve when you stop smoking:  · You lower the risks for lung cancer and heart disease in nonsmoking adults  · If you are pregnant, you lower the risk for miscarriage, early delivery, low birth weight, and stillbirth  You also lower your baby's risk for SIDS, obesity, developmental delay, and neurobehavioral problems, such as ADHD  · If you have children, you lower their risk for ear infections, colds, pneumonia, bronchitis, and asthma  For more information and support to stop smoking:   · Smokefree  gov  Phone: 2- 480 - 744-0645  Web Address: www smokefree  gov  Follow up with your healthcare provider as directed:  Write down your questions so you remember to ask them during your visits  © 2017 [address] Information is for End User's use only and may not be sold, redistributed or otherwise used for commercial purposes  All illustrations and images included in CareNotes® are the copyrighted property of [employer]  or Jhonny Gamez  The above information is an  only  It is not intended as medical advice for individual conditions or treatments  Talk to your doctor, nurse or pharmacist before following any medical regimen to see if it is safe and effective for you  Fall Prevention   AMBULATORY CARE:   Fall prevention  includes ways to make your home and other areas safer  It also includes ways you can move more carefully to prevent a fall  Health conditions that cause changes in your blood pressure, vision, or muscle strength and coordination may increase your risk for falls  Medicines may also increase your risk for falls if they make you dizzy, weak, or sleepy  Call 911 or have someone else call if:   · You have fallen and are unconscious  · You have fallen and cannot move part of your body  Contact your healthcare provider if:   · You have fallen and have pain or a headache  · You have questions or concerns about your condition or care  Fall prevention tips:   · Stand or sit up slowly  This may help you keep your balance and prevent falls  · Use assistive devices as directed  Your healthcare provider may suggest that you use a cane or walker to help you keep your balance  You may need to have grab bars put in your bathroom near the toilet or in the shower      · Wear shoes that fit well and have soles that   Wear shoes both inside and outside  Use slippers with good   Do not wear shoes with high heels  · Wear a personal alarm  This is a device that allows you to call 911 if you fall and need help  Ask your healthcare provider for more information  · Stay active  Exercise can help strengthen your muscles and improve your balance  Your healthcare provider may recommend water aerobics or walking  He or she may also recommend physical therapy to improve your coordination  Never start an exercise program without talking to your healthcare provider first      · Manage your medical conditions  Keep all appointments with your healthcare providers  Visit your eye doctor as directed  Home safety tips:   · Add items to prevent falls in the bathroom  Put nonslip strips on your bath or shower floor to prevent you from slipping  Use a bath mat if you do not have carpet in the bathroom  This will prevent you from falling when you step out of the bath or shower  Use a shower seat so you do not need to stand while you shower  Sit on the toilet or a chair in your bathroom to dry yourself and put on clothing  This will prevent you from losing your balance from drying or dressing yourself while you are standing  · Keep paths clear  Remove books, shoes, and other objects from walkways and stairs  Place cords for telephones and lamps out of the way so that you do not need to walk over them  Tape them down if you cannot move them  Remove small rugs  If you cannot remove a rug, secure it with double-sided tape  This will prevent you from tripping  · Install bright lights in your home  Use night lights to help light paths to the bathroom or kitchen  Always turn on the light before you start walking  · Keep items you use often on shelves within reach  Do not use a step stool to help you reach an item  · Paint or place reflective tape on the edges of your stairs    This will help you see the stairs better  Follow up with your healthcare provider as directed:  Write down your questions so you remember to ask them during your visits  © 2017 [address] Information is for End User's use only and may not be sold, redistributed or otherwise used for commercial purposes  All illustrations and images included in CareNotes® are the copyrighted property of [employer]  or [employer]honny Gamez  The above information is an  only  It is not intended as medical advice for individual conditions or treatments  Talk to your doctor, nurse or pharmacist before following any medical regimen to see if it is safe and effective for you  Advance Directives   WHAT YOU NEED TO KNOW:   What are advance directives? Advance directives are legal documents that state your wishes and plans for medical care  These plans are made ahead of time in case you lose your ability to make decisions for yourself  Advance directives can apply to any medical decision, such as the treatments you want, and if you want to donate organs  What are the types of advance directives? There are many types of advance directives, and each state has rules about how to use them  You may choose a combination of any of the following:  · Living will: This is a written record of the treatment you want  You can also choose which treatments you do not want, which to limit, and which to stop at a certain time  This includes surgery, medicine, IV fluid, and tube feedings  · Durable power of  for healthcare [facility]): This is a written record that states who you want to make healthcare choices for you when you are unable to make them for yourself  This person, called a proxy, is usually a family member or a friend  You may choose more than 1 proxy  · Do not resuscitate (DNR) order:  A DNR order is used in case your heart stops beating or you stop breathing   It is a request not to have certain forms of treatment, such as CPR  A DNR order may be included in other types of advance directives  · Medical directive: This covers the care that you want if you are in a coma, near death, or unable to make decisions for yourself  You can list the treatments you want for each condition  Treatment may include pain medicine, surgery, blood transfusions, dialysis, IV or tube feedings, and a ventilator (breathing machine)  · Values history: This document has questions about your views, beliefs, and how you feel and think about life  This information can help others choose the care that you would choose  Why are advance directives important? An advance directive helps you control your care  Although spoken wishes may be used, it is better to have your wishes written down  Spoken wishes can be misunderstood, or not followed  Treatments may be given even if you do not want them  An advance directive may make it easier for your family to make difficult choices about your care  How do I decide what to put in my advance directives? · Make informed decisions:  Make sure you fully understand treatments or care you may receive  Think about the benefits and problems your decisions could cause for you or your family  Talk to healthcare providers if you have concerns or questions before you write down your wishes  You may also want to talk with your Faith or , or a   Check your state laws to make sure that what you put in your advance directive is legal      · Sign all forms:  Sign and date your advance directive when you have finished  You may also need 2 witnesses to sign the forms  Witnesses cannot be your doctor or his staff, your spouse, heirs or beneficiaries, people you owe money to, or your chosen proxy  Talk to your family, proxy, and healthcare providers about your advance directive  Give each person a copy, and keep one for yourself in a place you can get to easily   Do not keep it hidden or locked away  · Review and revise your plans: You can revise your advance directive at any time, as long as you are able to make decisions  Review your plan every year, and when there are changes in your life, or your health  When you make changes, let your family, proxy, and healthcare providers know  Give each a new copy  Where can I find more information? · [employer]  [address] , [address]  Phone: 7- 657 - 818-8647  Phone: 1- 735 - 651-7790  Web Address: http://www  aafp org  · [address] [facility])  [address], [address] [facility] , [address] [place] [address]  Phone: 7- [phone] - [phone]  Phone: [phone]  Web Address: Rafiq avila  CARE AGREEMENT:   You have the right to help plan your care  To help with this plan, you must learn about your health condition and treatment options  You must also learn about advance directives and how they are used  Work with your healthcare providers to decide what care will be used to treat you  You always have the right to refuse treatment  The above information is an  only  It is not intended as medical advice for individual conditions or treatments  Talk to your doctor, nurse or pharmacist before following any medical regimen to see if it is safe and effective for you  © 2017 [address] Information is for End User's use only and may not be sold, redistributed or otherwise used for commercial purposes  All illustrations and images included in CareNotes® are the copyrighted property of [employer]  or [employer]ny Gamez

## 2019-02-07 NOTE — PROGRESS NOTES
Assessment/Plan:    Asthma  Intermittent, associated with allergic rhinitis  Continue with ventolin prn and alvesco with exacerbations  Failed singulair  She is also followed by Allergist Dr Thee Carlos  She is up to date with influenza vaccine and pneumonia series  Essential hypertension  Stable, continue on amlodipine-benazepril 5/10mg daily  Has upcoming echo due per Cardiology  Anxiety and depression  Stable, she is asymptomatic  Continue on wellbutrin 150mg daily    1  Intermittent asthma without complication, unspecified asthma severity  CBC   2  Essential hypertension  TSH, 3rd generation with Free T4 reflex    CBC    Basic metabolic panel   3  Anxiety and depression  buPROPion (WELLBUTRIN SR) 150 mg 12 hr tablet   4  Medicare annual wellness visit, subsequent     5  Screening for malignant neoplasm of breast  Mammo screening bilateral w cad   6  Postmenopausal  DXA bone density spine hip and pelvis   7  Screening for colon cancer  Cologuard       Subjective:      Patient ID: Myra Montoya is a 69 y o  female  HPI  50-year-old female with asthma, allergic rhinitis, HLD and HTN here for follow-up care  Reports she had blood work done at [facility]     She was seen by Cardiology for follow up of HLD and HTN  No medication changes made, echo was ordered  Home bp are 120-130/70s  She remains on lotrel  She has not been treated for cholesterol  She requests refill of wellbutrin  Reports she has had anxiety most of her life  She denies panic attacks  Previously went to therapist     Reports asthma has been well controlled  She has not needed to use ventolin prn, uses alvesco with exacerbations  Triggers are her allergens to dust, pollen, mild, cat dander, shellfish and grass      The following portions of the patient's history were reviewed and updated as appropriate: allergies, current medications, past family history, past medical history, past social history, past surgical history and problem list     Current Outpatient Prescriptions:     albuterol (VENTOLIN HFA) 90 mcg/act inhaler, Ventolin HFA 90 mcg/actuation aerosol inhaler, Disp: , Rfl:     amLODIPine-benazepril (LOTREL 5-10) 5-10 MG per capsule, Take 1 capsule by mouth daily, Disp: 90 capsule, Rfl: 1    buPROPion (WELLBUTRIN SR) 150 mg 12 hr tablet, Take 1 tablet (150 mg total) by mouth daily, Disp: 90 tablet, Rfl: 1    Cholecalciferol (VITAMIN D3) 1000 units CAPS, Daily, Disp: , Rfl:     ciclesonide (ALVESCO) 160 MCG/ACT inhaler, Inhale 2 puffs 2 (two) times a day, Disp: , Rfl:     conjugated estrogens (PREMARIN) vaginal cream, Insert 0 5 g into the vagina once a week, Disp: 42 5 g, Rfl: 0    EPINEPHrine (EPIPEN) 0 3 mg/0 3 mL SOAJ, epinephrine 0 3 mg/0 3 mL injection, auto-injector, Disp: , Rfl:     ipratropium (ATROVENT) 0 03 % nasal spray, SPRAY TWO SPRAYS IN EACH NOSTRIL TWICE A DAY, Disp: , Rfl:     zafirlukast (ACCOLATE) 20 MG tablet, Take 1 tablet by mouth every 12 (twelve) hours, Disp: , Rfl:     aspirin 81 MG tablet, Daily, Disp: , Rfl:       Review of Systems   Constitutional: Negative  HENT: Negative  Respiratory: Negative  Cardiovascular: Negative  Gastrointestinal: Negative  Genitourinary: Negative  Neurological: Negative  Psychiatric/Behavioral: Positive for dysphoric mood  Negative for sleep disturbance  The patient is nervous/anxious  Objective:    /80 (BP Location: Left arm, Patient Position: Sitting)   Pulse 70   Temp 97 6 °F (36 4 °C)   Resp 16   Ht 5' 3" (1 6 m)   Wt 78 kg (172 lb)   SpO2 97%   BMI 30 47 kg/m²      Physical Exam   Constitutional: She is oriented to person, place, and time  She appears well-developed and well-nourished  No distress  obese   HENT:   Right Ear: External ear normal    Left Ear: External ear normal    Nose: Nose normal    Mouth/Throat: Oropharynx is clear and moist  No oropharyngeal exudate     Eyes: Conjunctivae and EOM are normal    Neck: Neck supple  Cardiovascular: Normal rate, regular rhythm, normal heart sounds and intact distal pulses  Pulmonary/Chest: Effort normal and breath sounds normal  No respiratory distress  She has no wheezes  Defers breast exam   Abdominal: Soft  Bowel sounds are normal  She exhibits no distension  There is no tenderness  Neurological: She is alert and oriented to person, place, and time  Psychiatric: She has a normal mood and affect  Her behavior is normal    Vitals reviewed

## 2019-04-09 ENCOUNTER — HOSPITAL ENCOUNTER (OUTPATIENT)
Dept: NON INVASIVE DIAGNOSTICS | Facility: CLINIC | Age: 79
Discharge: HOME | End: 2019-04-09
Payer: COMMERCIAL

## 2019-04-09 DIAGNOSIS — R94.31 EKG, ABNORMAL: ICD-10-CM

## 2019-04-09 PROCEDURE — 93306 TTE W/DOPPLER COMPLETE: CPT

## 2019-04-10 PROCEDURE — 93306 TTE W/DOPPLER COMPLETE: CPT | Performed by: INTERNAL MEDICINE

## 2019-04-18 ENCOUNTER — TELEPHONE (OUTPATIENT)
Dept: CARDIOLOGY CLINIC | Facility: CLINIC | Age: 79
End: 2019-04-18

## 2019-08-21 ENCOUNTER — ANNUAL EXAM (OUTPATIENT)
Dept: OBGYN CLINIC | Facility: CLINIC | Age: 79
End: 2019-08-21
Payer: COMMERCIAL

## 2019-08-21 VITALS
DIASTOLIC BLOOD PRESSURE: 74 MMHG | HEIGHT: 63 IN | SYSTOLIC BLOOD PRESSURE: 122 MMHG | BODY MASS INDEX: 30.83 KG/M2 | WEIGHT: 174 LBS

## 2019-08-21 DIAGNOSIS — Z12.39 BREAST CANCER SCREENING: ICD-10-CM

## 2019-08-21 DIAGNOSIS — N95.2 VAGINAL ATROPHY: ICD-10-CM

## 2019-08-21 DIAGNOSIS — Z85.41 HISTORY OF CERVICAL CANCER: ICD-10-CM

## 2019-08-21 DIAGNOSIS — Z01.419 ENCOUNTER FOR ANNUAL ROUTINE GYNECOLOGICAL EXAMINATION: Primary | ICD-10-CM

## 2019-08-21 PROCEDURE — S0612 ANNUAL GYNECOLOGICAL EXAMINA: HCPCS | Performed by: OBSTETRICS & GYNECOLOGY

## 2019-08-21 RX ORDER — ALPRAZOLAM 0.25 MG/1
TABLET ORAL DAILY
COMMUNITY
End: 2019-12-17 | Stop reason: ALTCHOICE

## 2019-08-21 RX ORDER — VALACYCLOVIR HYDROCHLORIDE 500 MG/1
TABLET, FILM COATED ORAL
COMMUNITY
End: 2020-11-10

## 2019-08-21 RX ORDER — OMEPRAZOLE 40 MG/1
CAPSULE, DELAYED RELEASE ORAL
COMMUNITY
End: 2020-11-10

## 2019-08-21 RX ORDER — AMOXICILLIN AND CLAVULANATE POTASSIUM 875; 125 MG/1; MG/1
TABLET, FILM COATED ORAL
COMMUNITY
Start: 2016-11-26 | End: 2019-12-17 | Stop reason: ALTCHOICE

## 2019-08-21 NOTE — PROGRESS NOTES
Assessment/Plan:    Pap smear deferred  Encouraged self-breast examination as well as calcium supplementation  Continue annual mammogram   We discussed increasing vaginal estrogen once a week and then titrating accordingly  She will return to office in 1 year or p r n  No problem-specific Assessment & Plan notes found for this encounter  Diagnoses and all orders for this visit:    Encounter for annual routine gynecological examination    History of cervical cancer    Breast cancer screening  -     Mammo screening bilateral w 3d & cad; Future    Vaginal atrophy  -     conjugated estrogens (PREMARIN) vaginal cream; Insert 0 5 g into the vagina once a week    Other orders  -     ALPRAZolam (XANAX) 0 25 mg tablet; Daily  -     amoxicillin-clavulanate (AUGMENTIN) 875-125 mg per tablet; TAKE ONE TABLET BY MOUTH TWICE A DAY WITH FOOD  -     omeprazole (PriLOSEC) 40 MG capsule  -     valACYclovir (VALTREX) 500 mg tablet; valacyclovir 500 mg tablet          Subjective:      Patient ID: Rema Dodd is a 67 y o  female  HPI     This is a 52-year-old female  ( x3) presents for her annual gyn exam   Patient was diagnosed with cervical cancer   She underwent an abdominal hysterectomy BSO  She did not require any further treatment  She then underwent a vaginal sling at the age of 73  Since then she has been on Premarin vaginal cream and has titrated down to once a month  She denies any changes in bowel or bladder function  She has been sexually active in the course of the year  She has had a male  for the last 30 years  They do not live together  She denies any vaginal bleeding or spotting  She did have her mammogram yesterday  Her last colonoscopy was approximately 5 years ago  She does follow up with her family physician on a regular basis      The following portions of the patient's history were reviewed and updated as appropriate: allergies, current medications, past family history, past medical history, past social history, past surgical history and problem list     Review of Systems   Constitutional: Negative for fatigue, fever and unexpected weight change  Respiratory: Negative for cough, chest tightness, shortness of breath and wheezing  Cardiovascular: Negative  Negative for chest pain and palpitations  Gastrointestinal: Negative  Negative for abdominal distention, abdominal pain, blood in stool, constipation, diarrhea, nausea and vomiting  Genitourinary: Negative  Negative for difficulty urinating, dyspareunia, dysuria, flank pain, frequency, genital sores, hematuria, pelvic pain, urgency, vaginal bleeding, vaginal discharge and vaginal pain  Skin: Negative for rash  Objective:      /74   Ht 5' 3" (1 6 m)   Wt 78 9 kg (174 lb)   BMI 30 82 kg/m²          Physical Exam   Constitutional: She appears well-developed and well-nourished  Cardiovascular: Normal rate and regular rhythm  Pulmonary/Chest: Effort normal and breath sounds normal  Right breast exhibits no inverted nipple, no mass, no nipple discharge, no skin change and no tenderness  Left breast exhibits no inverted nipple, no mass, no nipple discharge, no skin change and no tenderness  Abdominal: Soft  Bowel sounds are normal  She exhibits no distension  There is no tenderness  There is no rebound and no guarding  Genitourinary: Vagina normal  There is no lesion on the right labia  There is no lesion on the left labia  Right adnexum displays no mass, no tenderness and no fullness  Left adnexum displays no mass, no tenderness and no fullness  No vaginal discharge found  Genitourinary Comments: The vagina is evident of estrogen deficiency  There is no evidence of pelvic floor prolapse  The cervix is surgically absent  Rectovaginal exam is confirmatory

## 2019-10-01 ENCOUNTER — OFFICE VISIT (OUTPATIENT)
Dept: PODIATRY | Facility: CLINIC | Age: 79
End: 2019-10-01
Payer: COMMERCIAL

## 2019-10-01 VITALS
HEIGHT: 63 IN | WEIGHT: 174 LBS | DIASTOLIC BLOOD PRESSURE: 74 MMHG | SYSTOLIC BLOOD PRESSURE: 130 MMHG | HEART RATE: 72 BPM | BODY MASS INDEX: 30.83 KG/M2

## 2019-10-01 DIAGNOSIS — M20.12 ACQUIRED HALLUX VALGUS OF LEFT FOOT: ICD-10-CM

## 2019-10-01 DIAGNOSIS — M21.622 TAILOR'S BUNIONETTE, LEFT: ICD-10-CM

## 2019-10-01 DIAGNOSIS — M76.822 POSTERIOR TIBIAL TENDINITIS OF LEFT LEG: Primary | ICD-10-CM

## 2019-10-01 PROCEDURE — 99203 OFFICE O/P NEW LOW 30 MIN: CPT | Performed by: PODIATRIST

## 2019-10-01 NOTE — PROGRESS NOTES
Assessment/Plan:    Explained to patient that her symptoms are consistent with posterior tibial tendinitis  Patient prefers to avoid oral medication  She was placed on Voltaren gel for t i d  Application  Orthotics are treatment option in the future  No problem-specific Assessment & Plan notes found for this encounter  Diagnoses and all orders for this visit:    Posterior tibial tendinitis of left leg  -     diclofenac sodium (VOLTAREN) 1 %; Apply 2 g topically 3 (three) times a day    Acquired hallux valgus of left foot    Tailor's bunionette, left          Subjective:      Patient ID: Destinee Machuca is a 67 y o  female  HPI     Patient presents with pain in her left foot extending up to her left leg for approximately 3 months  Pain developed without any recalled trauma  Patient is very active though in walks miles daily  The pain is present when walking  Patient has no pain at night when she tries to sleep  Patient has a bunion deformity of the left foot along with a tailor's bunion of the left foot but they are generally asymptomatic  Patient is also interested in orthotics  The following portions of the patient's history were reviewed and updated as appropriate: allergies, current medications, past family history, past medical history, past social history, past surgical history and problem list     Review of Systems   Constitutional: Negative  Respiratory: Negative  Cardiovascular: Negative  Gastrointestinal: Negative  Musculoskeletal: Negative  Neurological: Negative  Objective:      /74   Pulse 72   Ht 5' 3" (1 6 m)   Wt 78 9 kg (174 lb) Comment: verbal  BMI 30 82 kg/m²          Physical Exam   Constitutional: She is oriented to person, place, and time  She appears well-developed and well-nourished  Cardiovascular: Regular rhythm and intact distal pulses     Temperature and turgor within normal limits bilateral   Musculoskeletal: She exhibits tenderness and deformity  Pain with palpation along the posterior tibial tendon left foot at its insertion had also along its course  Hallux valgus deformity noted left foot along with tailor's bunion left foot  The right 2nd toe is extremely straight from prior surgery but does not purchase  Neurological: She is alert and oriented to person, place, and time  Skin: Skin is warm  Capillary refill takes 2 to 3 seconds  No rash noted  No erythema

## 2019-12-16 PROBLEM — H10.13 ALLERGIC CONJUNCTIVITIS OF BOTH EYES: Status: ACTIVE | Noted: 2019-12-16

## 2019-12-16 PROBLEM — K21.9 LARYNGOPHARYNGEAL REFLUX: Status: ACTIVE | Noted: 2019-12-16

## 2019-12-16 PROBLEM — J30.81 ALLERGIC RHINITIS DUE TO ANIMAL (CAT) (DOG) HAIR AND DANDER: Status: ACTIVE | Noted: 2019-12-16

## 2019-12-16 PROBLEM — L20.84 INTRINSIC ATOPIC DERMATITIS: Status: ACTIVE | Noted: 2019-12-16

## 2019-12-16 PROBLEM — Z91.013 SHELLFISH ALLERGY: Status: ACTIVE | Noted: 2019-12-16

## 2019-12-16 PROBLEM — J45.30 MILD PERSISTENT ASTHMA WITHOUT COMPLICATION: Status: ACTIVE | Noted: 2019-12-16

## 2019-12-17 PROBLEM — F41.9 ANXIETY: Status: ACTIVE | Noted: 2019-03-05

## 2019-12-17 PROBLEM — E66.9 OBESITY WITH BODY MASS INDEX 30 OR GREATER: Status: ACTIVE | Noted: 2019-09-25

## 2019-12-17 PROBLEM — E78.00 PURE HYPERCHOLESTEROLEMIA: Status: ACTIVE | Noted: 2019-09-25

## 2020-01-28 ENCOUNTER — APPOINTMENT (OUTPATIENT)
Dept: PHYSICAL THERAPY | Facility: OTHER | Age: 80
End: 2020-01-28
Payer: COMMERCIAL

## 2020-01-30 ENCOUNTER — OFFICE VISIT (OUTPATIENT)
Dept: PHYSICAL THERAPY | Facility: OTHER | Age: 80
End: 2020-01-30
Payer: COMMERCIAL

## 2020-01-30 DIAGNOSIS — M25.551 RIGHT HIP PAIN: Primary | ICD-10-CM

## 2020-01-30 PROCEDURE — 97110 THERAPEUTIC EXERCISES: CPT | Performed by: PHYSICAL THERAPIST

## 2020-01-30 PROCEDURE — 97161 PT EVAL LOW COMPLEX 20 MIN: CPT | Performed by: PHYSICAL THERAPIST

## 2020-01-30 NOTE — PROGRESS NOTES
PT Evaluation     Today's date: 2020  Patient name: Malu Claire  : 1940  MRN: [id number]  Referring provider: Mónica Robles, *  Dx: No diagnosis found  Assessment  Assessment details: Malu lCaire is a pleasant 67 y o  female who presents with  R sided hip pain she noticed at first when stiar climbing  She reported that she tried several exercises which helped minimally  No medication  No pain wiht driving or ditting  She reported that initally she has some difficulty with IADL's  She reported that she was using a anti inflamtory gel minaml reielf   No imaging or injeections  HEP issued and POC reviewed  Impairments: abnormal coordination, abnormal muscle firing, abnormal or restricted ROM, abnormal movement, activity intolerance, difficulty understanding, impaired physical strength, lacks appropriate home exercise program, pain with function and poor body mechanics    Symptom irritability: moderateUnderstanding of Dx/Px/POC: good   Prognosis: good    Goals  Short Term:  Pt will report decreased levels of pain by at least 2 subjective ratings in 4 weeks  Pt will demonstrate improved ROM by at least 10 degrees in 4 weeks  Pt will demonstrate improved strength by 1/2 grade  Long Term:   Pt will be independent in their HEP in 8 weeks  Pt will be be pain free with IADL's  Pt will demonstrate improved FOTO, > 80         Plan  Plan details: Prognosis above is given PT services 2x/week tapering to 1x/week over the next 3 months and home program adherence    Patient would benefit from: skilled physical therapy  Planned modality interventions: thermotherapy: hydrocollator packs  Planned therapy interventions: activity modification, joint mobilization, manual therapy, motor coordination training, neuromuscular re-education, patient education, self care, therapeutic activities, therapeutic exercise, graded activity and home exercise program  Frequency: 2x week  Treatment plan discussed with: patient        Subjective Evaluation    Pain  At best pain ratin  At worst pain ratin  Location: R hip pain on stiars and laying on the R side           Objective     General Comments:      Hip Comments   HiP ROM mild piriformis tightness and ITB tightness otherwise ROM WNL did not reproduce pain  TTP over the GT and proximal ITB         MMT 3/5 abd flex 4/5 ext 4/5 hip R/L  Knee MMT 5/5 B/L   Gait mild lateral lean B/L                 Hip:  scour=  -   claire =-      capsular mobility= -          long axis distraction=-    SLS=   - obers=     - piriformis test=  tightness                      Precautions:       Manual              ISTM with stick possible                                                                      Exercise Diary              Bike              Clam              Supine ER mini squat              Step up 4in              Std SLR              Supine SLR flex              Piriformis stretch                                                                                                                                                                                           Modalities

## 2020-02-04 ENCOUNTER — OFFICE VISIT (OUTPATIENT)
Dept: PHYSICAL THERAPY | Facility: OTHER | Age: 80
End: 2020-02-04
Payer: COMMERCIAL

## 2020-02-04 DIAGNOSIS — M25.551 RIGHT HIP PAIN: Primary | ICD-10-CM

## 2020-02-04 PROCEDURE — 97110 THERAPEUTIC EXERCISES: CPT | Performed by: PHYSICAL THERAPIST

## 2020-02-04 PROCEDURE — 97112 NEUROMUSCULAR REEDUCATION: CPT | Performed by: PHYSICAL THERAPIST

## 2020-02-04 NOTE — PROGRESS NOTES
Daily Note     Today's date: 2020  Patient name: Jossie Baum  : 1940  MRN: [id number]  Referring provider: Marly Cohen, *  Dx: No diagnosis found  Subjective: patient reported less pain with IADL's stair climbing since IE  Objective: See treatment diary below      Assessment: Tolerated treatment well  Patient demonstrated fatigue post treatment      Plan: Continue per plan of care        Precautions:       Manual              ISTM with stick possible                                                                      Exercise Diary  2 4            Bike  6min             Clam  otb 20             Supine ER mini squat  5''x5             Step up 6in  10eac             Std SLR  peach 10each             Supine SLR flex              Piriformis stretch  10''x10            Mini squat  20             multifitus press 15ea otb                                                                                                                                                                Modalities

## 2020-02-12 ENCOUNTER — APPOINTMENT (OUTPATIENT)
Dept: PHYSICAL THERAPY | Facility: OTHER | Age: 80
End: 2020-02-12
Payer: COMMERCIAL

## 2020-02-13 ENCOUNTER — OFFICE VISIT (OUTPATIENT)
Dept: PHYSICAL THERAPY | Facility: OTHER | Age: 80
End: 2020-02-13
Payer: COMMERCIAL

## 2020-02-13 DIAGNOSIS — M25.551 RIGHT HIP PAIN: Primary | ICD-10-CM

## 2020-02-13 PROCEDURE — 97110 THERAPEUTIC EXERCISES: CPT | Performed by: PHYSICAL THERAPIST

## 2020-02-13 PROCEDURE — 97112 NEUROMUSCULAR REEDUCATION: CPT | Performed by: PHYSICAL THERAPIST

## 2020-02-13 NOTE — PROGRESS NOTES
Daily Note     Today's date: 2020  Patient name: Petra Turpin  : 1940  MRN: [id number]  Referring provider: Whitney Sofia, *  Dx: No diagnosis found  Subjective: patient reported less pain with IADL's stair climbing since IE  Good form on the stiars here today  Objective: See treatment diary below      Assessment: Tolerated treatment well  Patient demonstrated fatigue post treatment      Plan: Continue per plan of care  Precautions:       Manual   2 13           ISTM with stick possible                                                                      Exercise Diary  2 4 2 13           Bike  6min  6min            Clam  otb 20  gtb 20            Supine ER mini squat  5''x5  5''x10           Step up 6in  10eac  1 flight of stiars              Std SLR  peach 10each  otb 10ea            Supine SLR flex   10           Piriformis stretch  10''x10 10''x10            Mini squat  20  20           multifitus press 15ea otb  15otb                                                                                                                                                              Modalities

## 2020-02-20 ENCOUNTER — OFFICE VISIT (OUTPATIENT)
Dept: PHYSICAL THERAPY | Facility: OTHER | Age: 80
End: 2020-02-20
Payer: COMMERCIAL

## 2020-02-20 DIAGNOSIS — M25.551 RIGHT HIP PAIN: Primary | ICD-10-CM

## 2020-02-20 PROCEDURE — 97110 THERAPEUTIC EXERCISES: CPT | Performed by: PHYSICAL THERAPIST

## 2020-02-20 PROCEDURE — 97112 NEUROMUSCULAR REEDUCATION: CPT | Performed by: PHYSICAL THERAPIST

## 2020-02-20 NOTE — PROGRESS NOTES
Daily Note     Today's date: 2020  Patient name: Melanie Harris  : 1940  MRN: [id number]  Referring provider: Netta Bronson, *  Dx:   Encounter Diagnosis     ICD-10-CM    1  Right hip pain M25 551                   Subjective: patient reported that  She is doing very well stair climbing is pain free descending is mild pain  She has met all goals set for PT  D/C from PT  Improved strength 4/5 hip abd  Objective: See treatment diary below      Assessment: Tolerated treatment well  Patient demonstrated fatigue post treatment      Plan: Continue per plan of care  Precautions:       Manual   2 13           ISTM with stick possible                                                                      Exercise Diary  2 4 2 13 2 120          Bike  6min  6min  6min           Clam  otb 20  gtb 20  gtb 20           Supine ER  5''x5  5''x10 5''x10          Step up 6in  10eac  1 flight of stiars              Std SLR  peach 10each  otb 10ea  15x otb           Supine SLR flex   10           Piriformis stretch  10''x10 10''x10  10''x10           Mini squat  20  20 20          multifitus press 15ea otb  15otb 15otb           maching foam     20          FT EC    5''x10                                                                                                                                    Modalities

## 2020-07-30 ENCOUNTER — OFFICE VISIT (OUTPATIENT)
Dept: INTERNAL MEDICINE CLINIC | Facility: CLINIC | Age: 80
End: 2020-07-30
Payer: COMMERCIAL

## 2020-07-30 VITALS
HEART RATE: 76 BPM | DIASTOLIC BLOOD PRESSURE: 78 MMHG | OXYGEN SATURATION: 96 % | HEIGHT: 62 IN | WEIGHT: 175 LBS | TEMPERATURE: 97.6 F | SYSTOLIC BLOOD PRESSURE: 118 MMHG | BODY MASS INDEX: 32.2 KG/M2

## 2020-07-30 DIAGNOSIS — Z13.29 SCREENING FOR THYROID DISORDER: ICD-10-CM

## 2020-07-30 DIAGNOSIS — I10 ESSENTIAL HYPERTENSION: Primary | ICD-10-CM

## 2020-07-30 DIAGNOSIS — Z12.31 ENCOUNTER FOR SCREENING MAMMOGRAM FOR BREAST CANCER: ICD-10-CM

## 2020-07-30 DIAGNOSIS — F32.A ANXIETY AND DEPRESSION: ICD-10-CM

## 2020-07-30 DIAGNOSIS — Z12.39 ENCOUNTER FOR BREAST CANCER SCREENING OTHER THAN MAMMOGRAM: ICD-10-CM

## 2020-07-30 DIAGNOSIS — E78.2 MIXED HYPERLIPIDEMIA: ICD-10-CM

## 2020-07-30 DIAGNOSIS — J30.1 CHRONIC SEASONAL ALLERGIC RHINITIS DUE TO POLLEN: ICD-10-CM

## 2020-07-30 DIAGNOSIS — Z13.0 SCREENING FOR DEFICIENCY ANEMIA: ICD-10-CM

## 2020-07-30 DIAGNOSIS — I10 BENIGN HYPERTENSION: ICD-10-CM

## 2020-07-30 DIAGNOSIS — F41.9 ANXIETY AND DEPRESSION: ICD-10-CM

## 2020-07-30 DIAGNOSIS — J45.30 MILD PERSISTENT ASTHMA WITHOUT COMPLICATION: ICD-10-CM

## 2020-07-30 PROCEDURE — 99214 OFFICE O/P EST MOD 30 MIN: CPT | Performed by: NURSE PRACTITIONER

## 2020-07-30 PROCEDURE — 4040F PNEUMOC VAC/ADMIN/RCVD: CPT | Performed by: NURSE PRACTITIONER

## 2020-07-30 PROCEDURE — 3074F SYST BP LT 130 MM HG: CPT | Performed by: NURSE PRACTITIONER

## 2020-07-30 PROCEDURE — 1036F TOBACCO NON-USER: CPT | Performed by: NURSE PRACTITIONER

## 2020-07-30 PROCEDURE — 3078F DIAST BP <80 MM HG: CPT | Performed by: NURSE PRACTITIONER

## 2020-07-30 PROCEDURE — 3008F BODY MASS INDEX DOCD: CPT | Performed by: NURSE PRACTITIONER

## 2020-07-30 PROCEDURE — 1160F RVW MEDS BY RX/DR IN RCRD: CPT | Performed by: NURSE PRACTITIONER

## 2020-07-30 RX ORDER — BUPROPION HYDROCHLORIDE 150 MG/1
150 TABLET, EXTENDED RELEASE ORAL DAILY
Qty: 90 TABLET | Refills: 1 | Status: SHIPPED | OUTPATIENT
Start: 2020-07-30 | End: 2020-10-26 | Stop reason: SDUPTHER

## 2020-07-30 RX ORDER — AMLODIPINE BESYLATE AND BENAZEPRIL HYDROCHLORIDE 5; 10 MG/1; MG/1
1 CAPSULE ORAL DAILY
Qty: 90 CAPSULE | Refills: 1 | Status: SHIPPED | OUTPATIENT
Start: 2020-07-30 | End: 2020-10-26 | Stop reason: SDUPTHER

## 2020-07-30 NOTE — ASSESSMENT & PLAN NOTE
Well controlled  On zafirlukast and asmanex  Ventolin PRN   UTD with pneumonia vaccines   Sees Dr Fortunato Jo yearly

## 2020-10-12 ENCOUNTER — OFFICE VISIT (OUTPATIENT)
Dept: INTERNAL MEDICINE CLINIC | Facility: CLINIC | Age: 80
End: 2020-10-12
Payer: COMMERCIAL

## 2020-10-12 VITALS
BODY MASS INDEX: 32.42 KG/M2 | SYSTOLIC BLOOD PRESSURE: 136 MMHG | HEART RATE: 77 BPM | TEMPERATURE: 96.7 F | RESPIRATION RATE: 18 BRPM | OXYGEN SATURATION: 98 % | DIASTOLIC BLOOD PRESSURE: 78 MMHG | HEIGHT: 62 IN | WEIGHT: 176.2 LBS

## 2020-10-12 DIAGNOSIS — N39.0 URINARY TRACT INFECTION WITHOUT HEMATURIA, SITE UNSPECIFIED: Primary | ICD-10-CM

## 2020-10-12 DIAGNOSIS — R50.9 FEVER, UNSPECIFIED FEVER CAUSE: ICD-10-CM

## 2020-10-12 PROCEDURE — 99213 OFFICE O/P EST LOW 20 MIN: CPT | Performed by: NURSE PRACTITIONER

## 2020-10-12 PROCEDURE — 3078F DIAST BP <80 MM HG: CPT | Performed by: NURSE PRACTITIONER

## 2020-10-12 PROCEDURE — 3075F SYST BP GE 130 - 139MM HG: CPT | Performed by: NURSE PRACTITIONER

## 2020-10-12 PROCEDURE — 1160F RVW MEDS BY RX/DR IN RCRD: CPT | Performed by: NURSE PRACTITIONER

## 2020-10-12 PROCEDURE — 1036F TOBACCO NON-USER: CPT | Performed by: NURSE PRACTITIONER

## 2020-10-12 RX ORDER — CEPHALEXIN 500 MG/1
CAPSULE ORAL
COMMUNITY
Start: 2020-10-07 | End: 2020-11-10

## 2020-10-26 DIAGNOSIS — F32.A ANXIETY AND DEPRESSION: ICD-10-CM

## 2020-10-26 DIAGNOSIS — I10 BENIGN HYPERTENSION: ICD-10-CM

## 2020-10-26 DIAGNOSIS — F41.9 ANXIETY AND DEPRESSION: ICD-10-CM

## 2020-10-26 RX ORDER — BUPROPION HYDROCHLORIDE 150 MG/1
150 TABLET, EXTENDED RELEASE ORAL DAILY
Qty: 90 TABLET | Refills: 1 | Status: SHIPPED | OUTPATIENT
Start: 2020-10-26 | End: 2021-02-11 | Stop reason: SDUPTHER

## 2020-10-26 RX ORDER — AMLODIPINE BESYLATE AND BENAZEPRIL HYDROCHLORIDE 5; 10 MG/1; MG/1
1 CAPSULE ORAL DAILY
Qty: 90 CAPSULE | Refills: 1 | Status: SHIPPED | OUTPATIENT
Start: 2020-10-26 | End: 2021-02-11 | Stop reason: SDUPTHER

## 2020-11-10 ENCOUNTER — OFFICE VISIT (OUTPATIENT)
Dept: INTERNAL MEDICINE CLINIC | Facility: CLINIC | Age: 80
End: 2020-11-10
Payer: COMMERCIAL

## 2020-11-10 VITALS
WEIGHT: 175 LBS | BODY MASS INDEX: 32.2 KG/M2 | HEIGHT: 62 IN | TEMPERATURE: 97.1 F | OXYGEN SATURATION: 98 % | SYSTOLIC BLOOD PRESSURE: 120 MMHG | HEART RATE: 75 BPM | DIASTOLIC BLOOD PRESSURE: 70 MMHG

## 2020-11-10 DIAGNOSIS — I10 ESSENTIAL HYPERTENSION: ICD-10-CM

## 2020-11-10 DIAGNOSIS — J45.30 MILD PERSISTENT ASTHMA WITHOUT COMPLICATION: ICD-10-CM

## 2020-11-10 DIAGNOSIS — Z00.00 MEDICARE ANNUAL WELLNESS VISIT, SUBSEQUENT: Primary | ICD-10-CM

## 2020-11-10 DIAGNOSIS — F41.9 ANXIETY AND DEPRESSION: ICD-10-CM

## 2020-11-10 DIAGNOSIS — E78.2 MIXED HYPERLIPIDEMIA: ICD-10-CM

## 2020-11-10 DIAGNOSIS — J30.1 CHRONIC SEASONAL ALLERGIC RHINITIS DUE TO POLLEN: ICD-10-CM

## 2020-11-10 DIAGNOSIS — F32.A ANXIETY AND DEPRESSION: ICD-10-CM

## 2020-11-10 PROCEDURE — 1160F RVW MEDS BY RX/DR IN RCRD: CPT | Performed by: NURSE PRACTITIONER

## 2020-11-10 PROCEDURE — 99214 OFFICE O/P EST MOD 30 MIN: CPT | Performed by: NURSE PRACTITIONER

## 2020-11-10 PROCEDURE — G0439 PPPS, SUBSEQ VISIT: HCPCS | Performed by: NURSE PRACTITIONER

## 2020-11-10 PROCEDURE — 1170F FXNL STATUS ASSESSED: CPT | Performed by: NURSE PRACTITIONER

## 2020-11-10 PROCEDURE — 3725F SCREEN DEPRESSION PERFORMED: CPT | Performed by: NURSE PRACTITIONER

## 2020-11-10 PROCEDURE — 1125F AMNT PAIN NOTED PAIN PRSNT: CPT | Performed by: NURSE PRACTITIONER

## 2020-11-10 PROCEDURE — 3074F SYST BP LT 130 MM HG: CPT | Performed by: NURSE PRACTITIONER

## 2020-11-10 PROCEDURE — 1036F TOBACCO NON-USER: CPT | Performed by: NURSE PRACTITIONER

## 2020-11-10 PROCEDURE — 3078F DIAST BP <80 MM HG: CPT | Performed by: NURSE PRACTITIONER

## 2021-02-11 DIAGNOSIS — F32.A ANXIETY AND DEPRESSION: ICD-10-CM

## 2021-02-11 DIAGNOSIS — I10 BENIGN HYPERTENSION: ICD-10-CM

## 2021-02-11 DIAGNOSIS — F41.9 ANXIETY AND DEPRESSION: ICD-10-CM

## 2021-02-11 RX ORDER — BUPROPION HYDROCHLORIDE 150 MG/1
150 TABLET, EXTENDED RELEASE ORAL DAILY
Qty: 90 TABLET | Refills: 1 | Status: SHIPPED | OUTPATIENT
Start: 2021-02-11 | End: 2021-06-09 | Stop reason: SDUPTHER

## 2021-02-11 RX ORDER — AMLODIPINE BESYLATE AND BENAZEPRIL HYDROCHLORIDE 5; 10 MG/1; MG/1
1 CAPSULE ORAL DAILY
Qty: 90 CAPSULE | Refills: 1 | Status: SHIPPED | OUTPATIENT
Start: 2021-02-11 | End: 2021-06-09 | Stop reason: SDUPTHER

## 2021-02-12 DIAGNOSIS — Z23 ENCOUNTER FOR IMMUNIZATION: ICD-10-CM

## 2021-02-25 ENCOUNTER — TELEPHONE (OUTPATIENT)
Dept: PODIATRY | Facility: CLINIC | Age: 81
End: 2021-02-25

## 2021-02-25 NOTE — TELEPHONE ENCOUNTER
I called Vimal Person back to let her know that Dr Alejandro Asif said you can now get the Voltaren gel over the counter     I left her a message

## 2021-03-01 DIAGNOSIS — N95.2 VAGINAL ATROPHY: ICD-10-CM

## 2021-03-01 NOTE — TELEPHONE ENCOUNTER
Pt is scheduled for April 19, 2021 and is requesting a refill of premarin cream to hold her to appt  To [facility]

## 2021-04-01 ENCOUNTER — OFFICE VISIT (OUTPATIENT)
Dept: PODIATRY | Facility: CLINIC | Age: 81
End: 2021-04-01
Payer: COMMERCIAL

## 2021-04-01 VITALS
HEIGHT: 62 IN | BODY MASS INDEX: 32.2 KG/M2 | DIASTOLIC BLOOD PRESSURE: 104 MMHG | WEIGHT: 175 LBS | HEART RATE: 79 BPM | SYSTOLIC BLOOD PRESSURE: 165 MMHG

## 2021-04-01 DIAGNOSIS — M76.822 POSTERIOR TIBIAL TENDINITIS OF LEFT LEG: Primary | ICD-10-CM

## 2021-04-01 DIAGNOSIS — L03.032 ABSCESS OF GREAT TOENAIL, LEFT: ICD-10-CM

## 2021-04-01 PROCEDURE — 11730 AVULSION NAIL PLATE SIMPLE 1: CPT | Performed by: PODIATRIST

## 2021-04-01 PROCEDURE — 99212 OFFICE O/P EST SF 10 MIN: CPT | Performed by: PODIATRIST

## 2021-04-01 RX ORDER — LIDOCAINE HYDROCHLORIDE AND EPINEPHRINE 10; 10 MG/ML; UG/ML
1 INJECTION, SOLUTION INFILTRATION; PERINEURAL ONCE
Status: COMPLETED | OUTPATIENT
Start: 2021-04-01 | End: 2021-04-01

## 2021-04-01 RX ORDER — LIDOCAINE HYDROCHLORIDE 10 MG/ML
1 INJECTION, SOLUTION EPIDURAL; INFILTRATION; INTRACAUDAL; PERINEURAL ONCE
Status: COMPLETED | OUTPATIENT
Start: 2021-04-01 | End: 2021-04-01

## 2021-04-01 RX ADMIN — LIDOCAINE HYDROCHLORIDE AND EPINEPHRINE 1 ML: 10; 10 INJECTION, SOLUTION INFILTRATION; PERINEURAL at 16:57

## 2021-04-01 RX ADMIN — LIDOCAINE HYDROCHLORIDE 1 ML: 10 INJECTION, SOLUTION EPIDURAL; INFILTRATION; INTRACAUDAL; PERINEURAL at 16:56

## 2021-04-01 NOTE — PROGRESS NOTES
Patient presents with 2 complaints  Patient has severe pain in her left great toenail  This toenail is dystrophic and shoe pressure causes pain  There is purulence drainage beneath the plate  A 2nd concern involves recurrence of pain along the posterior tibial tendon left foot  Patient had similar pain in 2019 that responded well to Voltaren gel  On exam, left hallux nail plate is extremely painful with direct pressure  Purulence is noted with pressure  Both feet are markedly pronated in stance  Pain with palpation posterior tibial tendon left foot  Pedal pulses are within normal limits  Treatment:  Recommended total nail avulsion for left hallux  Procedure as  Follows: Anesthesia via 3 cc of a 1-1 mixture of 1% xylocaine with epinephrine and 1% xylocaine plain  Betadine prep of toe  Left great toenail was removed in its entirety  Bacitracin dressing applied  Patient to soak in warm water followed by Neosporin dressing  Recommended that patient return to Voltaren gel on a b i d  basis  Also recommended orthotics due to the pronation  Patient will be rescheduled   For casting of the orthotics in 3 weeks  She will be seen in 1 week to assess the left great toe surgery  Nail removal    Date/Time: 4/1/2021 6:17 PM  Performed by: Torri Duarte DPM  Authorized by: Torri Duarte DPM     Patient location:  [facility] Protocol:  Consent: Verbal consent obtained  Risks and benefits: risks, benefits and alternatives were discussed  Consent given by: patient  Patient understanding: patient states understanding of the procedure being performed  Patient identity confirmed: verbally with patient      Location:     Foot:  L big toe  Pre-procedure details:     Skin preparation:  Betadine  Anesthesia (see MAR for exact dosages):      Anesthesia method:  Nerve block    Block anesthetic:  Lidocaine 1% WITH epi and lidocaine 1% w/o epi    Block injection procedure:  Anatomic landmarks identified Block outcome:  Anesthesia achieved  Nail Removal:     Nail removed:  Complete    Nail bed sutured: no    Ingrown nail:     Wedge excision of skin: no      Nail matrix removed or ablated:  None  Post-procedure details:     Dressing:  4x4 sterile gauze and antibiotic ointment    Patient tolerance of procedure:   Tolerated well, no immediate complications

## 2021-04-06 ENCOUNTER — RA CDI HCC (OUTPATIENT)
Dept: OTHER | Facility: HOSPITAL | Age: 81
End: 2021-04-06

## 2021-04-06 NOTE — PROGRESS NOTES
[facility]  coding oppertunities          Chart reviewed, no opportunity found: CHART REVIEWED, NO OPPORTUNITY FOUND

## 2021-04-08 ENCOUNTER — OFFICE VISIT (OUTPATIENT)
Dept: PODIATRY | Facility: CLINIC | Age: 81
End: 2021-04-08
Payer: COMMERCIAL

## 2021-04-08 VITALS
SYSTOLIC BLOOD PRESSURE: 149 MMHG | HEART RATE: 73 BPM | BODY MASS INDEX: 32.2 KG/M2 | WEIGHT: 175 LBS | DIASTOLIC BLOOD PRESSURE: 84 MMHG | HEIGHT: 62 IN

## 2021-04-08 DIAGNOSIS — M76.822 POSTERIOR TIBIAL TENDINITIS OF LEFT LEG: Primary | ICD-10-CM

## 2021-04-08 DIAGNOSIS — L03.032 ABSCESS OF GREAT TOENAIL, LEFT: ICD-10-CM

## 2021-04-08 PROCEDURE — 99212 OFFICE O/P EST SF 10 MIN: CPT | Performed by: PODIATRIST

## 2021-04-08 PROCEDURE — 1036F TOBACCO NON-USER: CPT | Performed by: PODIATRIST

## 2021-04-08 PROCEDURE — 1160F RVW MEDS BY RX/DR IN RCRD: CPT | Performed by: PODIATRIST

## 2021-04-08 NOTE — PROGRESS NOTES
Patient presents 1 week post total avulsion left hallux toenail  Surgical site is healing uneventfully but upon removing Band-Aid patient suffered a skin tear  Applied step thickened bacitracin to the tear  Patient may discontinue soaks and Neosporin but should continue with dry sterile dressing until eschar forms  Patient is scheduled in approximately 2 weeks for casting for orthotics due to posterior tibial tendinitis  At patient request, she was given a form for handicapped parking

## 2021-04-13 ENCOUNTER — IMMUNIZATIONS (OUTPATIENT)
Dept: FAMILY MEDICINE CLINIC | Facility: HOSPITAL | Age: 81
End: 2021-04-13

## 2021-04-13 DIAGNOSIS — Z23 ENCOUNTER FOR IMMUNIZATION: Primary | ICD-10-CM

## 2021-04-13 PROCEDURE — 91300 SARS-COV-2 / COVID-19 MRNA VACCINE (PFIZER-BIONTECH) 30 MCG: CPT

## 2021-04-13 PROCEDURE — 0001A SARS-COV-2 / COVID-19 MRNA VACCINE (PFIZER-BIONTECH) 30 MCG: CPT

## 2021-04-29 ENCOUNTER — PROCEDURE VISIT (OUTPATIENT)
Dept: PODIATRY | Facility: CLINIC | Age: 81
End: 2021-04-29
Payer: COMMERCIAL

## 2021-04-29 VITALS
SYSTOLIC BLOOD PRESSURE: 144 MMHG | BODY MASS INDEX: 34.04 KG/M2 | HEIGHT: 62 IN | WEIGHT: 185 LBS | HEART RATE: 76 BPM | DIASTOLIC BLOOD PRESSURE: 80 MMHG

## 2021-04-29 DIAGNOSIS — M76.822 POSTERIOR TIBIAL TENDINITIS OF LEFT LEG: Primary | ICD-10-CM

## 2021-04-29 PROCEDURE — S0395 IMPRESSION CASTING FT: HCPCS | Performed by: PODIATRIST

## 2021-05-06 ENCOUNTER — IMMUNIZATIONS (OUTPATIENT)
Dept: FAMILY MEDICINE CLINIC | Facility: HOSPITAL | Age: 81
End: 2021-05-06

## 2021-05-06 DIAGNOSIS — Z23 ENCOUNTER FOR IMMUNIZATION: Primary | ICD-10-CM

## 2021-05-06 PROCEDURE — 0002A SARS-COV-2 / COVID-19 MRNA VACCINE (PFIZER-BIONTECH) 30 MCG: CPT

## 2021-05-06 PROCEDURE — 91300 SARS-COV-2 / COVID-19 MRNA VACCINE (PFIZER-BIONTECH) 30 MCG: CPT

## 2021-05-26 ENCOUNTER — RA CDI HCC (OUTPATIENT)
Dept: OTHER | Facility: HOSPITAL | Age: 81
End: 2021-05-26

## 2021-05-26 NOTE — PROGRESS NOTES
[facility]          Chart reviewed, no opportunity found: CHART REVIEWED, NO OPPORTUNITY FOUND              Patients insurance company:  [employer] [facility] (Medicare Advantage and Commercial)

## 2021-06-04 ENCOUNTER — OFFICE VISIT (OUTPATIENT)
Dept: PODIATRY | Facility: CLINIC | Age: 81
End: 2021-06-04
Payer: COMMERCIAL

## 2021-06-04 DIAGNOSIS — M76.822 POSTERIOR TIBIAL TENDINITIS OF LEFT LEG: Primary | ICD-10-CM

## 2021-06-04 DIAGNOSIS — M21.622 TAILOR'S BUNIONETTE, LEFT: ICD-10-CM

## 2021-06-04 DIAGNOSIS — M20.12 ACQUIRED HALLUX VALGUS OF LEFT FOOT: ICD-10-CM

## 2021-06-04 PROCEDURE — L3000 FT INSERT UCB BERKELEY SHELL: HCPCS | Performed by: PODIATRIST

## 2021-06-09 ENCOUNTER — OFFICE VISIT (OUTPATIENT)
Dept: INTERNAL MEDICINE CLINIC | Facility: CLINIC | Age: 81
End: 2021-06-09
Payer: COMMERCIAL

## 2021-06-09 VITALS
WEIGHT: 183 LBS | HEART RATE: 69 BPM | OXYGEN SATURATION: 98 % | DIASTOLIC BLOOD PRESSURE: 68 MMHG | HEIGHT: 62 IN | TEMPERATURE: 97.1 F | SYSTOLIC BLOOD PRESSURE: 110 MMHG | BODY MASS INDEX: 33.68 KG/M2

## 2021-06-09 DIAGNOSIS — F41.9 ANXIETY AND DEPRESSION: ICD-10-CM

## 2021-06-09 DIAGNOSIS — J30.1 CHRONIC SEASONAL ALLERGIC RHINITIS DUE TO POLLEN: ICD-10-CM

## 2021-06-09 DIAGNOSIS — M76.812 ANTERIOR TIBIALIS TENDONITIS OF LEFT LEG: ICD-10-CM

## 2021-06-09 DIAGNOSIS — I10 BENIGN HYPERTENSION: ICD-10-CM

## 2021-06-09 DIAGNOSIS — F32.A ANXIETY AND DEPRESSION: ICD-10-CM

## 2021-06-09 DIAGNOSIS — E78.2 MIXED HYPERLIPIDEMIA: ICD-10-CM

## 2021-06-09 DIAGNOSIS — J45.30 MILD PERSISTENT ASTHMA WITHOUT COMPLICATION: ICD-10-CM

## 2021-06-09 DIAGNOSIS — I10 ESSENTIAL HYPERTENSION: Primary | ICD-10-CM

## 2021-06-09 PROCEDURE — 3078F DIAST BP <80 MM HG: CPT | Performed by: NURSE PRACTITIONER

## 2021-06-09 PROCEDURE — 99214 OFFICE O/P EST MOD 30 MIN: CPT | Performed by: NURSE PRACTITIONER

## 2021-06-09 PROCEDURE — 1160F RVW MEDS BY RX/DR IN RCRD: CPT | Performed by: NURSE PRACTITIONER

## 2021-06-09 PROCEDURE — 1036F TOBACCO NON-USER: CPT | Performed by: NURSE PRACTITIONER

## 2021-06-09 PROCEDURE — 3074F SYST BP LT 130 MM HG: CPT | Performed by: NURSE PRACTITIONER

## 2021-06-09 RX ORDER — AMLODIPINE BESYLATE AND BENAZEPRIL HYDROCHLORIDE 5; 10 MG/1; MG/1
1 CAPSULE ORAL DAILY
Qty: 90 CAPSULE | Refills: 1 | Status: SHIPPED | OUTPATIENT
Start: 2021-06-09 | End: 2021-08-19 | Stop reason: SDUPTHER

## 2021-06-09 RX ORDER — BUPROPION HYDROCHLORIDE 150 MG/1
150 TABLET, EXTENDED RELEASE ORAL DAILY
Qty: 90 TABLET | Refills: 1 | Status: SHIPPED | OUTPATIENT
Start: 2021-06-09 | End: 2021-08-19 | Stop reason: SDUPTHER

## 2021-06-09 NOTE — PROGRESS NOTES
Assessment/Plan:    Chronic seasonal allergic rhinitis due to pollen  Uses Asmanex daily  Mild persistent asthma without complication  Stable  No recent exacerbations  Sees an allergist    Continue zafirlukast twice daily  Albuterol PRN  Hypertensive disorder  Well controlled  Continue amlodipine and benazepril     Anxiety and depression  Stable  On wellbutrin     Hyperlipidemia  Due for labs  Follow a low fat diet  Anterior tibialis tendonitis of left leg  Seeing podiatry  Improving with orthotics and acupuncture  voltaren gel as needed  BMI Counseling: Body mass index is 34 02 kg/m²  The BMI is above normal  Nutrition recommendations include reducing portion sizes, decreasing overall calorie intake and moderation in carbohydrate intake  Exercise recommendations include exercising 3-5 times per week  Diagnoses and all orders for this visit:    Essential hypertension    Anxiety and depression  -     buPROPion (WELLBUTRIN SR) 150 mg 12 hr tablet; Take 1 tablet (150 mg total) by mouth daily  -     TSH, 3rd generation with Free T4 reflex; Future    Anterior tibialis tendonitis of left leg    Mild persistent asthma without complication  -     CBC and differential; Future    Chronic seasonal allergic rhinitis due to pollen    Mixed hyperlipidemia  -     Lipid Panel with Direct LDL reflex; Future    Benign hypertension  -     amLODIPine-benazepril (LOTREL 5-10) 5-10 MG per capsule; Take 1 capsule by mouth daily  -     Comprehensive metabolic panel; Future          Subjective:      Patient ID: Fortunato Jordan is a [de-identified] y o  female  Here today for follow up  Mal Roberts is doing well  She is planning on joining [employer] this week  Her goal is to work on her health this year  She is upset she gained about 10 lbs this year  She has been having left ankle pain since January  She is seeing a podiatrist and getting acupuncture  She is hesitant to use voltaren gel       Her asthma has been well controlled  She denies any shortness of breath or wheezing  She had some worsening depression over the winter months but is not feeling pretty good  The following portions of the patient's history were reviewed and updated as appropriate: allergies, current medications, past family history, past medical history, past social history, past surgical history and problem list     Review of Systems   Constitutional: Negative for activity change, appetite change, fatigue and unexpected weight change  Eyes: Negative for visual disturbance  Respiratory: Negative for cough, chest tightness, shortness of breath and wheezing  Cardiovascular: Negative for chest pain, palpitations and leg swelling  Gastrointestinal: Negative for abdominal pain, blood in stool, constipation and diarrhea  Genitourinary: Negative for difficulty urinating  Musculoskeletal: Positive for arthralgias  Skin: Negative for rash  Allergic/Immunologic: Positive for environmental allergies  Neurological: Negative for dizziness, weakness, light-headedness and headaches  Psychiatric/Behavioral: Negative for decreased concentration, dysphoric mood and sleep disturbance  The patient is not nervous/anxious  Objective:      /68   Pulse 69   Temp (!) 97 1 °F (36 2 °C)   Ht 5' 1 5" (1 562 m)   Wt 83 kg (183 lb)   SpO2 98%   BMI 34 02 kg/m²          Physical Exam  Vitals signs reviewed  Constitutional:       Appearance: She is well-developed  HENT:      Head: Normocephalic and atraumatic  Right Ear: Tympanic membrane, ear canal and external ear normal       Left Ear: Tympanic membrane, ear canal and external ear normal    Eyes:      Conjunctiva/sclera: Conjunctivae normal       Pupils: Pupils are equal, round, and reactive to light  Neck:      Musculoskeletal: Neck supple  Cardiovascular:      Rate and Rhythm: Normal rate and regular rhythm  Pulses: Normal pulses        Heart sounds: Normal heart sounds  Pulmonary:      Effort: Pulmonary effort is normal       Breath sounds: Normal breath sounds  Abdominal:      General: Bowel sounds are normal       Palpations: Abdomen is soft  Musculoskeletal: Normal range of motion  General: No swelling  Skin:     General: Skin is warm and dry  Neurological:      Mental Status: She is alert and oriented to person, place, and time     Psychiatric:         Behavior: Behavior normal

## 2021-06-09 NOTE — ASSESSMENT & PLAN NOTE
Stable  No recent exacerbations  Sees an allergist    Continue zafirlukast twice daily  Albuterol PRN

## 2021-06-23 ENCOUNTER — ANNUAL EXAM (OUTPATIENT)
Dept: OBGYN CLINIC | Facility: CLINIC | Age: 81
End: 2021-06-23
Payer: COMMERCIAL

## 2021-06-23 VITALS
SYSTOLIC BLOOD PRESSURE: 150 MMHG | DIASTOLIC BLOOD PRESSURE: 90 MMHG | HEIGHT: 62 IN | BODY MASS INDEX: 33.68 KG/M2 | WEIGHT: 183 LBS

## 2021-06-23 DIAGNOSIS — Z12.31 ENCOUNTER FOR SCREENING MAMMOGRAM FOR BREAST CANCER: ICD-10-CM

## 2021-06-23 DIAGNOSIS — Z01.419 ENCOUNTER FOR ANNUAL ROUTINE GYNECOLOGICAL EXAMINATION: Primary | ICD-10-CM

## 2021-06-23 PROCEDURE — S0612 ANNUAL GYNECOLOGICAL EXAMINA: HCPCS | Performed by: OBSTETRICS & GYNECOLOGY

## 2021-06-23 RX ORDER — CAYENNE 450 MG
CAPSULE ORAL
COMMUNITY

## 2021-06-23 NOTE — PROGRESS NOTES
Assessment/Plan:  Pap smear deferred due to low risk status  Encouraged self-breast examination as well as calcium supplementation  Continue annual mammogram   Reviewed colon cancer screening, due for colonoscopy  She will continue to follow-up with her primary care physician as scheduled  Return to office in 1-2 years or p r n  No problem-specific Assessment & Plan notes found for this encounter  Diagnoses and all orders for this visit:    Encounter for annual routine gynecological examination    Encounter for screening mammogram for breast cancer  -     Mammo screening bilateral w 3d & cad; Future  -     Mammo screening bilateral w 3d & cad; Future    Other orders  -     ascorbic acid (VITAMIN C) 1000 MG tablet  -     Probiotic Product (Acidophilus) CHEW          Subjective:      Patient ID: Wesley Esparza is a [de-identified] y o  female  HPI       This is a very pleasant 71-year-old female  ( B the x3) presents for gyn exam   Patient went through menopause at approximately late 45s  She has never been on hormone replacement therapy  Her gyn history significant for [facility] in  for suspected cervical cancer  No adjuvant therapy was needed  She then underwent a vaginal sling and the age of 73  She has been on Premarin vaginal cream 1-2 times per month  She denies any changes in bowel or bladder function  She does have a male   They do not live together  She does follow up with her family physician on a regular basis  She is due for screening colonoscopy  Patient is very active  She does live near her daughter  The following portions of the patient's history were reviewed and updated as appropriate: allergies, current medications, past family history, past medical history, past social history, past surgical history and problem list     Review of Systems   Constitutional: Negative for fatigue, fever and unexpected weight change     Respiratory: Negative for cough, chest tightness, shortness of breath and wheezing  Cardiovascular: Negative  Negative for chest pain and palpitations  Gastrointestinal: Negative  Negative for abdominal distention, abdominal pain, blood in stool, constipation, diarrhea, nausea and vomiting  Genitourinary: Negative  Negative for difficulty urinating, dyspareunia, dysuria, flank pain, frequency, genital sores, hematuria, pelvic pain, urgency, vaginal bleeding, vaginal discharge and vaginal pain  Skin: Negative for rash  Objective:      /90   Ht 5' 1 5" (1 562 m)   Wt 83 kg (183 lb)   BMI 34 02 kg/m²          Physical Exam  Constitutional:       Appearance: Normal appearance  She is well-developed  Cardiovascular:      Rate and Rhythm: Normal rate and regular rhythm  Pulmonary:      Effort: Pulmonary effort is normal       Breath sounds: Normal breath sounds  Chest:      Breasts:         Right: No inverted nipple, mass, nipple discharge, skin change or tenderness  Left: No inverted nipple, mass, nipple discharge, skin change or tenderness  Abdominal:      General: Bowel sounds are normal  There is no distension  Palpations: Abdomen is soft  Tenderness: There is no abdominal tenderness  There is no guarding or rebound  Genitourinary:     Labia:         Right: No rash, tenderness or lesion  Left: No rash, tenderness or lesion  Vagina: Normal  No signs of injury  No vaginal discharge, tenderness or lesions  Uterus: Absent  Adnexa:         Right: No mass, tenderness or fullness  Left: No mass, tenderness or fullness  Comments: External genitalia   Reveals small sebaceous cyst   The vagina is evident of slight estrogen deficiency  The cervix is surgically absent  The cuff is well-supported  Rectovaginal exam is confirmatory  Skin:     General: Skin is dry  Neurological:      Mental Status: She is alert and oriented to person, place, and time

## 2021-08-19 DIAGNOSIS — F41.9 ANXIETY AND DEPRESSION: ICD-10-CM

## 2021-08-19 DIAGNOSIS — F32.A ANXIETY AND DEPRESSION: ICD-10-CM

## 2021-08-19 DIAGNOSIS — I10 BENIGN HYPERTENSION: ICD-10-CM

## 2021-08-19 RX ORDER — BUPROPION HYDROCHLORIDE 150 MG/1
150 TABLET, EXTENDED RELEASE ORAL DAILY
Qty: 90 TABLET | Refills: 1 | Status: SHIPPED | OUTPATIENT
Start: 2021-08-19 | End: 2021-10-20 | Stop reason: SDUPTHER

## 2021-08-19 RX ORDER — AMLODIPINE BESYLATE AND BENAZEPRIL HYDROCHLORIDE 5; 10 MG/1; MG/1
1 CAPSULE ORAL DAILY
Qty: 90 CAPSULE | Refills: 1 | Status: SHIPPED | OUTPATIENT
Start: 2021-08-19 | End: 2022-01-27 | Stop reason: SDUPTHER

## 2021-10-07 ENCOUNTER — VBI (OUTPATIENT)
Dept: ADMINISTRATIVE | Facility: OTHER | Age: 81
End: 2021-10-07

## 2021-10-14 ENCOUNTER — TELEPHONE (OUTPATIENT)
Dept: INTERNAL MEDICINE CLINIC | Facility: CLINIC | Age: 81
End: 2021-10-14

## 2021-10-14 PROCEDURE — U0003 INFECTIOUS AGENT DETECTION BY NUCLEIC ACID (DNA OR RNA); SEVERE ACUTE RESPIRATORY SYNDROME CORONAVIRUS 2 (SARS-COV-2) (CORONAVIRUS DISEASE [COVID-19]), AMPLIFIED PROBE TECHNIQUE, MAKING USE OF HIGH THROUGHPUT TECHNOLOGIES AS DESCRIBED BY CMS-2020-01-R: HCPCS | Performed by: NURSE PRACTITIONER

## 2021-10-14 PROCEDURE — U0005 INFEC AGEN DETEC AMPLI PROBE: HCPCS | Performed by: NURSE PRACTITIONER

## 2021-10-20 ENCOUNTER — OFFICE VISIT (OUTPATIENT)
Dept: INTERNAL MEDICINE CLINIC | Facility: CLINIC | Age: 81
End: 2021-10-20
Payer: COMMERCIAL

## 2021-10-20 VITALS
DIASTOLIC BLOOD PRESSURE: 80 MMHG | SYSTOLIC BLOOD PRESSURE: 122 MMHG | BODY MASS INDEX: 33.09 KG/M2 | TEMPERATURE: 97.7 F | HEIGHT: 62 IN | OXYGEN SATURATION: 97 % | WEIGHT: 179.8 LBS | HEART RATE: 69 BPM

## 2021-10-20 DIAGNOSIS — J45.30 MILD PERSISTENT ASTHMA WITHOUT COMPLICATION: ICD-10-CM

## 2021-10-20 DIAGNOSIS — U07.1 COVID-19 VIRUS INFECTION: ICD-10-CM

## 2021-10-20 DIAGNOSIS — I10 PRIMARY HYPERTENSION: ICD-10-CM

## 2021-10-20 DIAGNOSIS — F32.A ANXIETY AND DEPRESSION: ICD-10-CM

## 2021-10-20 DIAGNOSIS — E78.2 MIXED HYPERLIPIDEMIA: ICD-10-CM

## 2021-10-20 DIAGNOSIS — F41.9 ANXIETY AND DEPRESSION: ICD-10-CM

## 2021-10-20 DIAGNOSIS — H61.21 RIGHT EAR IMPACTED CERUMEN: Primary | ICD-10-CM

## 2021-10-20 PROCEDURE — 99214 OFFICE O/P EST MOD 30 MIN: CPT | Performed by: NURSE PRACTITIONER

## 2021-10-20 PROCEDURE — 1036F TOBACCO NON-USER: CPT | Performed by: NURSE PRACTITIONER

## 2021-10-20 PROCEDURE — 1160F RVW MEDS BY RX/DR IN RCRD: CPT | Performed by: NURSE PRACTITIONER

## 2021-10-20 PROCEDURE — 69210 REMOVE IMPACTED EAR WAX UNI: CPT | Performed by: NURSE PRACTITIONER

## 2021-10-20 PROCEDURE — 3079F DIAST BP 80-89 MM HG: CPT | Performed by: NURSE PRACTITIONER

## 2021-10-20 PROCEDURE — 3074F SYST BP LT 130 MM HG: CPT | Performed by: NURSE PRACTITIONER

## 2021-10-20 RX ORDER — BUPROPION HYDROCHLORIDE 150 MG/1
150 TABLET, EXTENDED RELEASE ORAL DAILY
Qty: 90 TABLET | Refills: 1 | Status: SHIPPED | OUTPATIENT
Start: 2021-10-20 | End: 2022-01-27 | Stop reason: SDUPTHER

## 2021-11-05 DIAGNOSIS — F43.21 GRIEF: Primary | ICD-10-CM

## 2021-11-05 RX ORDER — ALPRAZOLAM 0.25 MG/1
0.25 TABLET ORAL DAILY PRN
Qty: 10 TABLET | Refills: 0 | Status: SHIPPED | OUTPATIENT
Start: 2021-11-05

## 2021-11-11 ENCOUNTER — OFFICE VISIT (OUTPATIENT)
Dept: INTERNAL MEDICINE CLINIC | Facility: CLINIC | Age: 81
End: 2021-11-11
Payer: COMMERCIAL

## 2021-11-11 VITALS
HEIGHT: 62 IN | SYSTOLIC BLOOD PRESSURE: 130 MMHG | HEART RATE: 78 BPM | WEIGHT: 182 LBS | BODY MASS INDEX: 33.49 KG/M2 | DIASTOLIC BLOOD PRESSURE: 78 MMHG | OXYGEN SATURATION: 99 % | RESPIRATION RATE: 16 BRPM | TEMPERATURE: 97.7 F

## 2021-11-11 DIAGNOSIS — Z23 NEED FOR VACCINATION: ICD-10-CM

## 2021-11-11 DIAGNOSIS — Z00.00 MEDICARE ANNUAL WELLNESS VISIT, SUBSEQUENT: Primary | ICD-10-CM

## 2021-11-11 PROCEDURE — 3725F SCREEN DEPRESSION PERFORMED: CPT

## 2021-11-11 PROCEDURE — 1125F AMNT PAIN NOTED PAIN PRSNT: CPT

## 2021-11-11 PROCEDURE — G0008 ADMIN INFLUENZA VIRUS VAC: HCPCS

## 2021-11-11 PROCEDURE — 90662 IIV NO PRSV INCREASED AG IM: CPT

## 2021-11-11 PROCEDURE — G0439 PPPS, SUBSEQ VISIT: HCPCS

## 2021-11-11 PROCEDURE — 3288F FALL RISK ASSESSMENT DOCD: CPT

## 2021-11-11 PROCEDURE — 1160F RVW MEDS BY RX/DR IN RCRD: CPT

## 2021-11-11 PROCEDURE — 1170F FXNL STATUS ASSESSED: CPT

## 2021-11-17 ENCOUNTER — OFFICE VISIT (OUTPATIENT)
Dept: INTERNAL MEDICINE CLINIC | Facility: CLINIC | Age: 81
End: 2021-11-17
Payer: COMMERCIAL

## 2021-11-17 ENCOUNTER — TELEPHONE (OUTPATIENT)
Dept: INTERNAL MEDICINE CLINIC | Facility: CLINIC | Age: 81
End: 2021-11-17

## 2021-11-17 VITALS
DIASTOLIC BLOOD PRESSURE: 80 MMHG | OXYGEN SATURATION: 97 % | HEIGHT: 62 IN | TEMPERATURE: 96.2 F | BODY MASS INDEX: 33.38 KG/M2 | WEIGHT: 181.4 LBS | HEART RATE: 97 BPM | SYSTOLIC BLOOD PRESSURE: 130 MMHG

## 2021-11-17 DIAGNOSIS — R39.9 UTI SYMPTOMS: Primary | ICD-10-CM

## 2021-11-17 LAB
BACTERIA UR QL AUTO: ABNORMAL /HPF
BILIRUB UR QL STRIP: ABNORMAL
CLARITY UR: CLEAR
COLOR UR: ABNORMAL
GLUCOSE UR STRIP-MCNC: NEGATIVE MG/DL
HGB UR QL STRIP.AUTO: NEGATIVE
HYALINE CASTS #/AREA URNS LPF: ABNORMAL /LPF
KETONES UR STRIP-MCNC: ABNORMAL MG/DL
LEUKOCYTE ESTERASE UR QL STRIP: ABNORMAL
NITRITE UR QL STRIP: NEGATIVE
NON-SQ EPI CELLS URNS QL MICRO: ABNORMAL /HPF
PH UR STRIP.AUTO: 5.5 [PH]
PROT UR STRIP-MCNC: NEGATIVE MG/DL
RBC #/AREA URNS AUTO: ABNORMAL /HPF
SL AMB  POCT GLUCOSE, UA: ABNORMAL
SL AMB LEUKOCYTE ESTERASE,UA: ABNORMAL
SL AMB POCT BILIRUBIN,UA: 1
SL AMB POCT BLOOD,UA: ABNORMAL
SL AMB POCT CLARITY,UA: CLEAR
SL AMB POCT COLOR,UA: YELLOW
SL AMB POCT KETONES,UA: 5
SL AMB POCT NITRITE,UA: ABNORMAL
SL AMB POCT PH,UA: 6
SL AMB POCT SPECIFIC GRAVITY,UA: 1.03
SL AMB POCT URINE PROTEIN: ABNORMAL
SL AMB POCT UROBILINOGEN: 0.2
SP GR UR STRIP.AUTO: 1.03 (ref 1–1.03)
UROBILINOGEN UR QL STRIP.AUTO: 1 E.U./DL
WBC #/AREA URNS AUTO: ABNORMAL /HPF

## 2021-11-17 PROCEDURE — 1036F TOBACCO NON-USER: CPT | Performed by: NURSE PRACTITIONER

## 2021-11-17 PROCEDURE — 1160F RVW MEDS BY RX/DR IN RCRD: CPT | Performed by: NURSE PRACTITIONER

## 2021-11-17 PROCEDURE — 81001 URINALYSIS AUTO W/SCOPE: CPT | Performed by: NURSE PRACTITIONER

## 2021-11-17 PROCEDURE — 87086 URINE CULTURE/COLONY COUNT: CPT | Performed by: NURSE PRACTITIONER

## 2021-11-17 PROCEDURE — 81002 URINALYSIS NONAUTO W/O SCOPE: CPT | Performed by: NURSE PRACTITIONER

## 2021-11-17 PROCEDURE — 87077 CULTURE AEROBIC IDENTIFY: CPT | Performed by: NURSE PRACTITIONER

## 2021-11-17 PROCEDURE — 99213 OFFICE O/P EST LOW 20 MIN: CPT | Performed by: NURSE PRACTITIONER

## 2021-11-17 PROCEDURE — 3075F SYST BP GE 130 - 139MM HG: CPT | Performed by: NURSE PRACTITIONER

## 2021-11-17 PROCEDURE — 3079F DIAST BP 80-89 MM HG: CPT | Performed by: NURSE PRACTITIONER

## 2021-11-19 LAB — BACTERIA UR CULT: NORMAL

## 2021-11-20 ENCOUNTER — APPOINTMENT (OUTPATIENT)
Dept: LAB | Facility: CLINIC | Age: 81
End: 2021-11-20
Payer: COMMERCIAL

## 2021-11-20 DIAGNOSIS — F41.9 ANXIETY AND DEPRESSION: ICD-10-CM

## 2021-11-20 DIAGNOSIS — F32.A ANXIETY AND DEPRESSION: ICD-10-CM

## 2021-11-20 DIAGNOSIS — J45.30 MILD PERSISTENT ASTHMA WITHOUT COMPLICATION: ICD-10-CM

## 2021-11-20 DIAGNOSIS — I10 BENIGN HYPERTENSION: ICD-10-CM

## 2021-11-20 DIAGNOSIS — E78.2 MIXED HYPERLIPIDEMIA: ICD-10-CM

## 2021-11-20 LAB
ALBUMIN SERPL BCP-MCNC: 3.7 G/DL (ref 3.5–5)
ALP SERPL-CCNC: 77 U/L (ref 46–116)
ALT SERPL W P-5'-P-CCNC: 25 U/L (ref 12–78)
ANION GAP SERPL CALCULATED.3IONS-SCNC: 10 MMOL/L (ref 4–13)
AST SERPL W P-5'-P-CCNC: 16 U/L (ref 5–45)
BASOPHILS # BLD AUTO: 0.04 THOUSANDS/ΜL (ref 0–0.1)
BASOPHILS NFR BLD AUTO: 1 % (ref 0–1)
BILIRUB SERPL-MCNC: 0.46 MG/DL (ref 0.2–1)
BUN SERPL-MCNC: 19 MG/DL (ref 5–25)
CALCIUM SERPL-MCNC: 9.1 MG/DL (ref 8.3–10.1)
CHLORIDE SERPL-SCNC: 105 MMOL/L (ref 100–108)
CHOLEST SERPL-MCNC: 234 MG/DL
CO2 SERPL-SCNC: 27 MMOL/L (ref 21–32)
CREAT SERPL-MCNC: 0.72 MG/DL (ref 0.6–1.3)
EOSINOPHIL # BLD AUTO: 0.17 THOUSAND/ΜL (ref 0–0.61)
EOSINOPHIL NFR BLD AUTO: 3 % (ref 0–6)
ERYTHROCYTE [DISTWIDTH] IN BLOOD BY AUTOMATED COUNT: 14 % (ref 11.6–15.1)
GFR SERPL CREATININE-BSD FRML MDRD: 79 ML/MIN/1.73SQ M
GLUCOSE P FAST SERPL-MCNC: 92 MG/DL (ref 65–99)
HCT VFR BLD AUTO: 41.5 % (ref 34.8–46.1)
HDLC SERPL-MCNC: 82 MG/DL
HGB BLD-MCNC: 13.1 G/DL (ref 11.5–15.4)
IMM GRANULOCYTES # BLD AUTO: 0.02 THOUSAND/UL (ref 0–0.2)
IMM GRANULOCYTES NFR BLD AUTO: 0 % (ref 0–2)
LDLC SERPL CALC-MCNC: 142 MG/DL (ref 0–100)
LYMPHOCYTES # BLD AUTO: 1.08 THOUSANDS/ΜL (ref 0.6–4.47)
LYMPHOCYTES NFR BLD AUTO: 18 % (ref 14–44)
MCH RBC QN AUTO: 29.1 PG (ref 26.8–34.3)
MCHC RBC AUTO-ENTMCNC: 31.6 G/DL (ref 31.4–37.4)
MCV RBC AUTO: 92 FL (ref 82–98)
MONOCYTES # BLD AUTO: 0.74 THOUSAND/ΜL (ref 0.17–1.22)
MONOCYTES NFR BLD AUTO: 12 % (ref 4–12)
NEUTROPHILS # BLD AUTO: 3.93 THOUSANDS/ΜL (ref 1.85–7.62)
NEUTS SEG NFR BLD AUTO: 66 % (ref 43–75)
NRBC BLD AUTO-RTO: 0 /100 WBCS
PLATELET # BLD AUTO: 270 THOUSANDS/UL (ref 149–390)
PMV BLD AUTO: 9.7 FL (ref 8.9–12.7)
POTASSIUM SERPL-SCNC: 4.1 MMOL/L (ref 3.5–5.3)
PROT SERPL-MCNC: 7.4 G/DL (ref 6.4–8.2)
RBC # BLD AUTO: 4.5 MILLION/UL (ref 3.81–5.12)
SODIUM SERPL-SCNC: 142 MMOL/L (ref 136–145)
TRIGL SERPL-MCNC: 50 MG/DL
TSH SERPL DL<=0.05 MIU/L-ACNC: 1.24 UIU/ML (ref 0.36–3.74)
WBC # BLD AUTO: 5.98 THOUSAND/UL (ref 4.31–10.16)

## 2021-11-20 PROCEDURE — 36415 COLL VENOUS BLD VENIPUNCTURE: CPT

## 2021-11-20 PROCEDURE — 80061 LIPID PANEL: CPT

## 2021-11-20 PROCEDURE — 84443 ASSAY THYROID STIM HORMONE: CPT

## 2021-11-20 PROCEDURE — 85025 COMPLETE CBC W/AUTO DIFF WBC: CPT

## 2021-11-20 PROCEDURE — 80053 COMPREHEN METABOLIC PANEL: CPT

## 2021-12-30 ENCOUNTER — OFFICE VISIT (OUTPATIENT)
Dept: PHYSICAL THERAPY | Facility: OTHER | Age: 81
End: 2021-12-30
Payer: COMMERCIAL

## 2021-12-30 DIAGNOSIS — M25.561 ACUTE PAIN OF RIGHT KNEE: Primary | ICD-10-CM

## 2021-12-30 PROCEDURE — 97140 MANUAL THERAPY 1/> REGIONS: CPT | Performed by: PHYSICAL THERAPIST

## 2021-12-30 PROCEDURE — 97110 THERAPEUTIC EXERCISES: CPT | Performed by: PHYSICAL THERAPIST

## 2021-12-30 PROCEDURE — 97161 PT EVAL LOW COMPLEX 20 MIN: CPT | Performed by: PHYSICAL THERAPIST

## 2022-01-05 ENCOUNTER — APPOINTMENT (OUTPATIENT)
Dept: PHYSICAL THERAPY | Facility: OTHER | Age: 82
End: 2022-01-05
Payer: COMMERCIAL

## 2022-01-07 ENCOUNTER — APPOINTMENT (OUTPATIENT)
Dept: PHYSICAL THERAPY | Facility: OTHER | Age: 82
End: 2022-01-07
Payer: COMMERCIAL

## 2022-01-10 ENCOUNTER — OFFICE VISIT (OUTPATIENT)
Dept: PHYSICAL THERAPY | Facility: OTHER | Age: 82
End: 2022-01-10
Payer: COMMERCIAL

## 2022-01-10 ENCOUNTER — APPOINTMENT (OUTPATIENT)
Dept: PHYSICAL THERAPY | Facility: OTHER | Age: 82
End: 2022-01-10
Payer: COMMERCIAL

## 2022-01-10 DIAGNOSIS — M25.561 ACUTE PAIN OF RIGHT KNEE: Primary | ICD-10-CM

## 2022-01-10 PROCEDURE — 97110 THERAPEUTIC EXERCISES: CPT | Performed by: PHYSICAL THERAPIST

## 2022-01-10 PROCEDURE — 97112 NEUROMUSCULAR REEDUCATION: CPT | Performed by: PHYSICAL THERAPIST

## 2022-01-10 PROCEDURE — 97140 MANUAL THERAPY 1/> REGIONS: CPT | Performed by: PHYSICAL THERAPIST

## 2022-01-10 NOTE — PROGRESS NOTES
Daily Note     Today's date: 1/10/2022  Patient name: Bianka Garrett  : 1940  MRN: [id number]  Referring provider: Edgardo Washington, *  Dx: No diagnosis found  Subjective:  Patient reported feeling better after the IE  Pain is right along the ITB  Objective: See treatment diary below      Assessment: Tolerated treatment well  Patient demonstrated fatigue post treatment      Plan: Continue per plan of care  Precautions:      Manuals 12 30 21 1 10 21           Laser lateral knee distal ITB  MJ MJ            KT tape post glid of ITB  MJ MJ            Post glide of the fibula head    MJ  MJ                         Neuro Re-Ed             clamshell  20           Bridges  Try NV  NV            HR/TR  20           Er into wall with foot supports   5''x10                                                   Ther Ex             LAQ  30           Bike              hamcurl  30           biodex blaance/   3x med LOS                                                                Ther Activity                                       Gait Training                                       Modalities

## 2022-01-12 ENCOUNTER — APPOINTMENT (OUTPATIENT)
Dept: PHYSICAL THERAPY | Facility: OTHER | Age: 82
End: 2022-01-12
Payer: COMMERCIAL

## 2022-01-13 ENCOUNTER — APPOINTMENT (OUTPATIENT)
Dept: PHYSICAL THERAPY | Facility: OTHER | Age: 82
End: 2022-01-13
Payer: COMMERCIAL

## 2022-01-14 ENCOUNTER — OFFICE VISIT (OUTPATIENT)
Dept: PHYSICAL THERAPY | Facility: OTHER | Age: 82
End: 2022-01-14
Payer: COMMERCIAL

## 2022-01-14 DIAGNOSIS — M25.561 ACUTE PAIN OF RIGHT KNEE: Primary | ICD-10-CM

## 2022-01-14 PROCEDURE — 97140 MANUAL THERAPY 1/> REGIONS: CPT | Performed by: PHYSICAL THERAPIST

## 2022-01-14 PROCEDURE — 97110 THERAPEUTIC EXERCISES: CPT | Performed by: PHYSICAL THERAPIST

## 2022-01-14 PROCEDURE — 97112 NEUROMUSCULAR REEDUCATION: CPT | Performed by: PHYSICAL THERAPIST

## 2022-01-14 NOTE — PROGRESS NOTES
Daily Note     Today's date: 2022  Patient name: Pavan Barrientos  : 1940  MRN: [id number]  Referring provider: Milton Lane, *  Dx: No diagnosis found  Subjective: Tolerated progression well  She has improved toleance to activity  Objective: See treatment diary below      Assessment: Tolerated treatment well  Patient demonstrated fatigue post treatment      Plan: Continue per plan of care  Precautions:      Manuals 12 30 21 1 10 21 1 14 22          Laser lateral knee distal ITB  MJ MJ  MJ          KT tape post glid of ITB  MJ MJ  MJ          Post glide of the fibula head    MJ  MJ  MJ                       Neuro Re-Ed             clamshell  20           Bridges  Try NV  NV  10x2  Wit hband           HR/TR  20 20          Er into wall with foot supports   5''x10  Band laying down           Slider    3 way           Side step                           Ther Ex             LAQ  30 30          Bike              hamcurl  30 30           biodex blaance/   3x med LOS  3x LOS lv 10                                                               Ther Activity                                       Gait Training                                       Modalities

## 2022-01-17 ENCOUNTER — APPOINTMENT (OUTPATIENT)
Dept: PHYSICAL THERAPY | Facility: OTHER | Age: 82
End: 2022-01-17
Payer: COMMERCIAL

## 2022-01-19 ENCOUNTER — OFFICE VISIT (OUTPATIENT)
Dept: PHYSICAL THERAPY | Facility: OTHER | Age: 82
End: 2022-01-19
Payer: COMMERCIAL

## 2022-01-19 DIAGNOSIS — M25.561 ACUTE PAIN OF RIGHT KNEE: Primary | ICD-10-CM

## 2022-01-19 PROCEDURE — 97110 THERAPEUTIC EXERCISES: CPT | Performed by: PHYSICAL THERAPIST

## 2022-01-19 PROCEDURE — 97112 NEUROMUSCULAR REEDUCATION: CPT | Performed by: PHYSICAL THERAPIST

## 2022-01-19 PROCEDURE — 97140 MANUAL THERAPY 1/> REGIONS: CPT | Performed by: PHYSICAL THERAPIST

## 2022-01-19 NOTE — PROGRESS NOTES
Daily Note     Today's date: 2022  Patient name: Jono Zamorano  : 1940  MRN: [id number]  Referring provider: Latasha Bishop, *  Dx: No diagnosis found  Subjective: Tolerated progression well  She has improved toleance to activity  Objective: See treatment diary below      Assessment: Tolerated treatment well  Patient demonstrated fatigue post treatment      Plan: Continue per plan of care  Precautions:      Manuals 12 30 21 1 10 21 1 14 22          Laser lateral knee distal ITB  MJ MJ  MJ          KT tape post glid of ITB  MJ MJ  MJ          Post glide of the fibula head    MJ  MJ  MJ                       Neuro Re-Ed             clamshell  20           Bridges  Try NV  NV  10x2  Wit hband           HR/TR  20 20          Er into wall with foot supports   5''x10  Band laying down           Slider    3 way           Side step                           Ther Ex             LAQ  30 30          Bike              hamcurl  30 30           biodex blaance/   3x med LOS  3x LOS lv 10                                                               Ther Activity                                       Gait Training                                       Modalities

## 2022-01-20 ENCOUNTER — RA CDI HCC (OUTPATIENT)
Dept: OTHER | Facility: HOSPITAL | Age: 82
End: 2022-01-20

## 2022-01-20 NOTE — PROGRESS NOTES
[facility]  coding [facility]       Chart reviewed, no opportunity found: CHART REVIEWED, NO OPPORTUNITY FOUND                        Patients insurance company:  [employer] [facility] (Medicare Advantage and Commercial)

## 2022-01-21 ENCOUNTER — OFFICE VISIT (OUTPATIENT)
Dept: PHYSICAL THERAPY | Facility: OTHER | Age: 82
End: 2022-01-21
Payer: COMMERCIAL

## 2022-01-21 DIAGNOSIS — M25.561 ACUTE PAIN OF RIGHT KNEE: Primary | ICD-10-CM

## 2022-01-21 PROCEDURE — 97140 MANUAL THERAPY 1/> REGIONS: CPT | Performed by: PHYSICAL THERAPIST

## 2022-01-21 PROCEDURE — 97112 NEUROMUSCULAR REEDUCATION: CPT | Performed by: PHYSICAL THERAPIST

## 2022-01-22 NOTE — PROGRESS NOTES
Daily Note     Today's date: 2022  Patient name: Lori Mail  : 1940  MRN: [id number]  Referring provider: Scott Mckeon, *  Dx: No diagnosis found  Subjective:  improvoing endurance  Most diffcutly with trasnfers  Will move onto more functional exercises NV  Possible UE strengthing  Objective: See treatment diary below      Assessment: Tolerated treatment well  Patient demonstrated fatigue post treatment      Plan: Continue per plan of care  Precautions:      Manuals 12 30 21 1 10 21 1 21 22          Laser lateral knee distal ITB  MJ MJ  MJ          KT tape post glid of ITB  MJ MJ  MJ          Post glide of the fibula head    MJ  MJ  MJ                       Neuro Re-Ed             clamshell  20           Bridges  Try NV  NV  10x2  Wit hband           HR/TR  20 20          Er into wall with foot supports   5''x10  Band laying down           Slider    3 way           Side step                           Ther Ex             LAQ  30 30          Bike              hamcurl  30 30           biodex blaance/   3x med LOS  3x LOS lv 10                                                               Ther Activity                                       Gait Training                                       Modalities

## 2022-01-25 ENCOUNTER — OFFICE VISIT (OUTPATIENT)
Dept: PHYSICAL THERAPY | Facility: OTHER | Age: 82
End: 2022-01-25
Payer: COMMERCIAL

## 2022-01-25 DIAGNOSIS — M25.561 ACUTE PAIN OF RIGHT KNEE: Primary | ICD-10-CM

## 2022-01-25 PROCEDURE — 97110 THERAPEUTIC EXERCISES: CPT | Performed by: PHYSICAL THERAPIST

## 2022-01-25 PROCEDURE — 97140 MANUAL THERAPY 1/> REGIONS: CPT | Performed by: PHYSICAL THERAPIST

## 2022-01-25 PROCEDURE — 97112 NEUROMUSCULAR REEDUCATION: CPT | Performed by: PHYSICAL THERAPIST

## 2022-01-25 NOTE — PROGRESS NOTES
Daily Note     Today's date: 2022  Patient name: Bianka Garrett  : 1940  MRN: [id number]  Referring provider: Edgardo Washington, *  Dx: No diagnosis found  Subjective:  Updated to some UE exerciessicne she feels LE tranfers are impacted by UE strength=  Objective: See treatment diary below      Assessment: Tolerated treatment well  Patient demonstrated fatigue post treatment      Plan: Continue per plan of care  Precautions:      Manuals 12 30 21 1 10 21 1 25 22          Laser lateral knee distal ITB  MJ MJ  MJ          KT tape post glid of ITB  MJ MJ  MJ          Post glide of the fibula head    MJ  MJ  MJ                       Neuro Re-Ed             clamshell  20           Bridges  Try NV  NV  10x2  Wit hband           HR/TR  20 20          Er into wall with foot supports   5''x10  Band laying down           Slider    3 way           Side step                           Ther Ex             LAQ  30 30          Bike              hamcurl  30 30           biodex blaance/   3x med LOS  3x LOS lv 10                                                               Ther Activity                                       Gait Training                                       Modalities

## 2022-01-27 ENCOUNTER — OFFICE VISIT (OUTPATIENT)
Dept: PHYSICAL THERAPY | Facility: OTHER | Age: 82
End: 2022-01-27
Payer: COMMERCIAL

## 2022-01-27 ENCOUNTER — OFFICE VISIT (OUTPATIENT)
Dept: INTERNAL MEDICINE CLINIC | Facility: CLINIC | Age: 82
End: 2022-01-27
Payer: COMMERCIAL

## 2022-01-27 VITALS
OXYGEN SATURATION: 97 % | BODY MASS INDEX: 33.68 KG/M2 | WEIGHT: 183 LBS | SYSTOLIC BLOOD PRESSURE: 124 MMHG | TEMPERATURE: 95.9 F | DIASTOLIC BLOOD PRESSURE: 80 MMHG | HEART RATE: 79 BPM | HEIGHT: 62 IN

## 2022-01-27 DIAGNOSIS — M25.561 ACUTE PAIN OF RIGHT KNEE: Primary | ICD-10-CM

## 2022-01-27 DIAGNOSIS — M54.41 CHRONIC RIGHT-SIDED LOW BACK PAIN WITH RIGHT-SIDED SCIATICA: ICD-10-CM

## 2022-01-27 DIAGNOSIS — R07.81 RIB PAIN ON RIGHT SIDE: Primary | ICD-10-CM

## 2022-01-27 DIAGNOSIS — F32.A ANXIETY AND DEPRESSION: ICD-10-CM

## 2022-01-27 DIAGNOSIS — F41.9 ANXIETY AND DEPRESSION: ICD-10-CM

## 2022-01-27 DIAGNOSIS — I10 BENIGN HYPERTENSION: ICD-10-CM

## 2022-01-27 DIAGNOSIS — G89.29 CHRONIC RIGHT-SIDED LOW BACK PAIN WITH RIGHT-SIDED SCIATICA: ICD-10-CM

## 2022-01-27 PROCEDURE — 97110 THERAPEUTIC EXERCISES: CPT | Performed by: PHYSICAL THERAPIST

## 2022-01-27 PROCEDURE — 99214 OFFICE O/P EST MOD 30 MIN: CPT | Performed by: NURSE PRACTITIONER

## 2022-01-27 PROCEDURE — 97140 MANUAL THERAPY 1/> REGIONS: CPT | Performed by: PHYSICAL THERAPIST

## 2022-01-27 PROCEDURE — 3079F DIAST BP 80-89 MM HG: CPT | Performed by: NURSE PRACTITIONER

## 2022-01-27 PROCEDURE — 3074F SYST BP LT 130 MM HG: CPT | Performed by: NURSE PRACTITIONER

## 2022-01-27 PROCEDURE — 97112 NEUROMUSCULAR REEDUCATION: CPT | Performed by: PHYSICAL THERAPIST

## 2022-01-27 RX ORDER — BUPROPION HYDROCHLORIDE 150 MG/1
150 TABLET, EXTENDED RELEASE ORAL 2 TIMES DAILY
Qty: 180 TABLET | Refills: 0 | Status: SHIPPED | OUTPATIENT
Start: 2022-01-27

## 2022-01-27 RX ORDER — AMLODIPINE BESYLATE AND BENAZEPRIL HYDROCHLORIDE 5; 10 MG/1; MG/1
1 CAPSULE ORAL DAILY
Qty: 90 CAPSULE | Refills: 1 | Status: SHIPPED | OUTPATIENT
Start: 2022-01-27

## 2022-01-27 NOTE — PROGRESS NOTES
Daily Note     Today's date: 2022  Patient name: Angelita Marc  : 1940  MRN: [id number]  Referring provider: Valentin Villarreal, *  Dx:   Encounter Diagnosis     ICD-10-CM    1  Acute pain of right knee  M25 561                   Subjective:  Updated to some UE exerciessicne she feels LE tranfers are impacted by UE strength=  Objective: See treatment diary below      Assessment: Tolerated treatment well  Patient demonstrated fatigue post treatment      Plan: Continue per plan of care  Precautions:      Manuals 12 30 21 1 10 21 1 27 22          Laser lateral knee distal ITB  MJ MJ  MJ          KT tape post glid of ITB  MJ MJ  NP          Post glide of the fibula head    MJ  MJ  MJ                       Neuro Re-Ed             clamshell  20 30          SLR std    15 abd and ext           Bridges  Try NV  NV  10x2  Wit hband           HR/TR  20 20          Er into wall with foot supports   5''x10  Band laying down           Slider    3 way           Side step    3 laps                        Ther Ex             LAQ  30 30          Bike              hamcurl  30 30           biodex blaance/   3x med LOS  3x LOS lv 10                                                               Ther Activity                                       Gait Training                                       Modalities

## 2022-01-27 NOTE — PROGRESS NOTES
Assessment/Plan:    Anxiety and depression  With increased symptoms over the winter  Increase wellbutrin to 150 mg twice daily  Call if symptoms not improving over the next 4 weeks  Lower back pain  Continue PT and massage  Benign hypertension  Well controlled  Continue amlodipine and benazepril        Diagnoses and all orders for this visit:    Rib pain on right side  Comments:  resolved  monitor for return of symptoms or rash (discussed shingles)     Anxiety and depression  -     buPROPion (WELLBUTRIN SR) 150 mg 12 hr tablet; Take 1 tablet (150 mg total) by mouth 2 (two) times a day    Benign hypertension  -     amLODIPine-benazepril (LOTREL 5-10) 5-10 MG per capsule; Take 1 capsule by mouth daily    Chronic right-sided low back pain with right-sided sciatica          Subjective:      Patient ID: Scarlet Rain is a [de-identified] y o  female  Here today with complaints of right rib pain  The pain started 2 days ago  She denies any injury or trauma  No rash noted  The pain has since fully resolved  She continues to have right leg pain  She is still in PT and getting acupuncture and massage  She has noticed some improvement  She has noticed some seasonal depression over the last month  She is doing ok but is requesting an increase in her wellbutrin which she's done in the past for her SAD  The following portions of the patient's history were reviewed and updated as appropriate: allergies, current medications, past family history, past medical history, past social history, past surgical history and problem list     Review of Systems   Constitutional: Negative for activity change, appetite change, fatigue and fever  Respiratory: Negative for cough and shortness of breath  Cardiovascular: Negative for chest pain, palpitations and leg swelling  Gastrointestinal: Negative for abdominal pain, diarrhea and nausea  Genitourinary: Negative for difficulty urinating     Musculoskeletal: Positive for arthralgias  Skin: Negative for rash  Neurological: Negative for dizziness, light-headedness and headaches  Psychiatric/Behavioral: Positive for dysphoric mood  Negative for decreased concentration, sleep disturbance and suicidal ideas  The patient is not nervous/anxious  Objective:      /80   Pulse 79   Temp (!) 95 9 °F (35 5 °C)   Ht 5' 1 5" (1 562 m)   Wt 83 kg (183 lb)   SpO2 97%   BMI 34 02 kg/m²          Physical Exam  Vitals reviewed  Constitutional:       Appearance: Normal appearance  HENT:      Head: Normocephalic and atraumatic  Eyes:      Conjunctiva/sclera: Conjunctivae normal    Cardiovascular:      Rate and Rhythm: Normal rate and regular rhythm  Heart sounds: Normal heart sounds  Pulmonary:      Effort: Pulmonary effort is normal       Breath sounds: Normal breath sounds  Abdominal:      General: Bowel sounds are normal       Palpations: Abdomen is soft  Tenderness: There is no abdominal tenderness  Musculoskeletal:         General: Normal range of motion  Skin:     General: Skin is warm  Findings: No rash  Neurological:      Mental Status: She is alert and oriented to person, place, and time     Psychiatric:         Mood and Affect: Mood normal          Behavior: Behavior normal

## 2022-01-31 ENCOUNTER — OFFICE VISIT (OUTPATIENT)
Dept: CARDIOLOGY CLINIC | Facility: CLINIC | Age: 82
End: 2022-01-31
Payer: COMMERCIAL

## 2022-01-31 ENCOUNTER — TELEPHONE (OUTPATIENT)
Dept: INTERNAL MEDICINE CLINIC | Facility: CLINIC | Age: 82
End: 2022-01-31

## 2022-01-31 VITALS
DIASTOLIC BLOOD PRESSURE: 80 MMHG | SYSTOLIC BLOOD PRESSURE: 140 MMHG | WEIGHT: 183.7 LBS | HEIGHT: 61 IN | HEART RATE: 71 BPM | BODY MASS INDEX: 34.68 KG/M2

## 2022-01-31 DIAGNOSIS — I10 BENIGN ESSENTIAL HYPERTENSION: Primary | ICD-10-CM

## 2022-01-31 DIAGNOSIS — E78.5 DYSLIPIDEMIA: ICD-10-CM

## 2022-01-31 DIAGNOSIS — E66.9 OBESITY WITH BODY MASS INDEX 30 OR GREATER: ICD-10-CM

## 2022-01-31 PROCEDURE — 93000 ELECTROCARDIOGRAM COMPLETE: CPT | Performed by: INTERNAL MEDICINE

## 2022-01-31 PROCEDURE — 1160F RVW MEDS BY RX/DR IN RCRD: CPT | Performed by: INTERNAL MEDICINE

## 2022-01-31 PROCEDURE — 1036F TOBACCO NON-USER: CPT | Performed by: INTERNAL MEDICINE

## 2022-01-31 PROCEDURE — 99214 OFFICE O/P EST MOD 30 MIN: CPT | Performed by: INTERNAL MEDICINE

## 2022-01-31 NOTE — PROGRESS NOTES
Cardiology Follow Up    Akhil Corona  1940  [id number]  [facility]  [address] [address]  [address] [facility]  [phone]    1  Benign essential hypertension  POCT ECG   2  Dyslipidemia  POCT ECG   3  Obesity with body mass index 30 or greater         Discussion/Summary:  Ms Lilliam Randall is a pleasant 32-year-old female who presents to the office today for routine follow-up  Since her last visit in 2019 she has been feeling well  She offers no cardiac complaints  Her blood pressure is slightly elevated in the office today but has been well controlled in the recent past during various office visits with providers  She took her medication about 30 minutes prior to arrival   For now no changes were made to her medication regimen  She does have the capability to check her blood pressure at home and I have asked her to do so and report persistently elevated readings to the office  In the meantime a low-salt diet was reinforced  Her most recent lipids were reviewed  Her LDL is suboptimal   She is not interested in medication  She plans on beginning Weight Watchers in the near future  She underwent an echocardiogram in 2019 which was unremarkable  Given she is asymptomatic no testing is advised  I will see her back in the office in one year sooner if deemed necessary  Interval History:   Ms Lilliam Randall is a pleasant 32-year-old female who presents to the office today for routine follow-up  She was last seen by one of my partners in 2019  Since that time she has been doing relatively well  She has not been exercising on a regular basis  She is currently receiving physical therapy due to an issue with her right IT band  She does remain active around her home  She can ascend steps carrying objects without any difficulty  She denies any chest pain or shortness of breath    She denies any signs or symptoms of congestive heart failure including lower extremity edema, paroxysmal nocturnal dyspnea, orthopnea, acute weight gain or increasing abdominal girth  She denies lightheadedness, syncope or presyncope  She denies palpitations  She denies symptoms of claudication  Medical Problems             Problem List     Asthma    Benign hypertension    Anxiety and depression    Symptomatic menopausal or female climacteric states    Hyperlipidemia    History of cervical cancer    Lower back pain    Chronic seasonal allergic rhinitis due to pollen    Mild persistent asthma without complication    Allergic conjunctivitis of both eyes    Allergic rhinitis due to animal (cat) (dog) hair and dander    Intrinsic atopic dermatitis    Shellfish allergy    Laryngopharyngeal reflux    Anxiety    Obesity with body mass index 30 or greater    Pure hypercholesterolemia    Anterior tibialis tendonitis of left leg    COVID-19 virus infection              Past Medical History:   Diagnosis Date    Anterior tibialis tendonitis of left leg     Cervical cancer ([address] )     Shingles      Social History     Socioeconomic History    Marital status:      Spouse name: Not on file    Number of children: 3    Years of education: Not on file    Highest education level: Not on file   Occupational History    Occupation: Retired     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   Tobacco Use    Smoking status: Never Smoker    Smokeless tobacco: Never Used   Vaping Use    Vaping Use: Never used   Substance and Sexual Activity    Alcohol use: Yes     Alcohol/week: 1 0 standard drink     Types: 1 Glasses of wine per week     Comment: socially    Drug use: Never    Sexual activity: Not Currently   Other Topics Concern    Not on file   Social History Narrative    Single    Retired, advertisement                Marital:   Lives With: Alone  Home Environment: [facility], Uses air , Has a window air conditioner, The basement is dry, Does not use a dehumidifier, The floors are wood, Uses baseboard heating, Uses natural gas heating, [place] Home is smoke-free  Pets: None  Occupation: Retired, [employer]  Personal Habits:Cigarette Use: Never Smoked Cigarettes       Social Determinants of Health     Financial Resource Strain: Not on file   Food Insecurity: Not on file   Transportation Needs: Not on file   Physical Activity: Not on file   Stress: Not on file   Social Connections: Not on file   Intimate Partner Violence: Not on file   Housing Stability: Not on file      Family History   Problem Relation Age of Onset    No Known Problems Mother     Heart disease Father     Heart disease Brother     Diabetes Brother     No Known Problems Son     No Known Problems Daughter     No Known Problems Daughter     Alcohol abuse Neg Hx     Substance Abuse Neg Hx     Mental illness Neg Hx     Depression Neg Hx      Past Surgical History:   Procedure Laterality Date    BLADDER SURGERY      HALLUX VALGUS CORRECTION Right     TOTAL ABDOMINAL HYSTERECTOMY  1987    Cervical CA       Current Outpatient Medications:     ALPRAZolam (XANAX) 0 25 mg tablet, Take 1 tablet (0 25 mg total) by mouth daily as needed for anxiety, Disp: 10 tablet, Rfl: 0    amLODIPine-benazepril (LOTREL 5-10) 5-10 MG per capsule, Take 1 capsule by mouth daily, Disp: 90 capsule, Rfl: 1    ascorbic acid (VITAMIN C) 1000 MG tablet, , Disp: , Rfl:     buPROPion (WELLBUTRIN SR) 150 mg 12 hr tablet, Take 1 tablet (150 mg total) by mouth 2 (two) times a day, Disp: 180 tablet, Rfl: 0    Cholecalciferol (VITAMIN D3) 1000 units CAPS, Daily, Disp: , Rfl:     conjugated estrogens (PREMARIN) vaginal cream, Insert 0 5 g into the vagina once a week, Disp: 42 5 g, Rfl: 0    EPINEPHrine (EPIPEN) 0 3 mg/0 3 mL SOAJ, Inject 0 3 mL (0 3 mg total) into a muscle once for 1 dose, Disp: 0 6 mL, Rfl: 0    ipratropium (ATROVENT) 0 03 % nasal spray, 2 sprays into each nostril 2 (two) times a day, Disp: 360 mL, Rfl: 3    Mometasone Furoate (Asmanex HFA) 200 MCG/ACT AERO, Inhale 2 puffs (400 mcg total) 2 (two) times a day, Disp: 13 g, Rfl: 11    Probiotic Product (Acidophilus) CHEW, , Disp: , Rfl:     zafirlukast (ACCOLATE) 20 MG tablet, Take 1 tablet (20 mg total) by mouth 2 (two) times a day, Disp: 180 tablet, Rfl: 3  Allergies   Allergen Reactions    Codeine Other (See Comments) and Tachycardia    Iodine - Food Allergy Other (See Comments)    Shellfish-Derived Products - Food Allergy        Labs:     Chemistry        Component Value Date/Time    K 4 1 11/20/2021 0805     11/20/2021 0805    CO2 27 11/20/2021 0805    BUN 19 11/20/2021 0805    CREATININE 0 72 11/20/2021 0805        Component Value Date/Time    CALCIUM 9 1 11/20/2021 0805    ALKPHOS 77 11/20/2021 0805    AST 16 11/20/2021 0805    ALT 25 11/20/2021 0805            No results found for: CHOL  Lab Results   Component Value Date    HDL 82 11/20/2021     Lab Results   Component Value Date    LDLCALC 142 (H) 11/20/2021     Lab Results   Component Value Date    TRIG 50 11/20/2021     No results found for: CHOLHDL    Imaging: No results found  ECG:  Normal sinus rhythm first-degree AV block, left ventricular hypertrophy, nonspecific ST and T-wave abnormality      Review of Systems   Cardiovascular: Negative for chest pain, dyspnea on exertion, leg swelling and near-syncope  Musculoskeletal: Positive for arthritis and joint pain  All other systems reviewed and are negative  Vitals:    01/31/22 0918   BP: 140/80   Pulse:      Vitals:    01/31/22 0858   Weight: 83 3 kg (183 lb 11 2 oz)     Height: 5' 1" (154 9 cm)   Body mass index is 34 71 kg/m²      Physical Exam:  General appearance:  Appears stated age, alert, well appearing and in no distress  HEENT:  PERRLA, EOMI, no scleral icterus, no conjunctival pallor  NECK:  Supple, No elevated JVP, no thyromegaly, no carotid bruits  HEART:  Regular rate and rhythm, normal S1/S2, no S3/S4, no murmur or rub  LUNGS:  Clear to auscultation bilaterally  ABDOMEN:  Soft, non-tender, positive bowel sounds, no rebound or guarding, no organomegaly   EXTREMITIES:  No edema  VASCULAR:  Normal pedal pulses   SKIN: No lesions or rashes on exposed skin  NEURO:  CN II-XII intact, no focal deficits

## 2022-01-31 NOTE — TELEPHONE ENCOUNTER
Insurance sent a fax and stated:     The supplied the patient with a temporary refill of Wellbutrin  mg     States drug is not coveted on formulary    Following steps: either a coverage review/ special circumstance or change prescription

## 2022-02-02 ENCOUNTER — OFFICE VISIT (OUTPATIENT)
Dept: PHYSICAL THERAPY | Facility: OTHER | Age: 82
End: 2022-02-02
Payer: COMMERCIAL

## 2022-02-02 DIAGNOSIS — M25.561 ACUTE PAIN OF RIGHT KNEE: Primary | ICD-10-CM

## 2022-02-02 PROCEDURE — 97112 NEUROMUSCULAR REEDUCATION: CPT | Performed by: PHYSICAL THERAPIST

## 2022-02-02 PROCEDURE — 97140 MANUAL THERAPY 1/> REGIONS: CPT | Performed by: PHYSICAL THERAPIST

## 2022-02-02 NOTE — PROGRESS NOTES
Daily Note     Today's date: 2022  Patient name: Bianka Garrett  : 1940  MRN: [id number]  Referring provider: Edgardo Washington, *  Dx:   No diagnosis found  Subjective:  Updated to some UE exerciessicne she feels LE tranfers are impacted by UE strength=  Objective: See treatment diary below      Assessment: Tolerated treatment well  Patient demonstrated fatigue post treatment      Plan: Continue per plan of care  Precautions:      Manuals 12 30 21 1 10 21 1 27 22 2 2         Laser lateral knee distal ITB  MJ MJ  MJ MJ          KT tape post glid of ITB and fib MJ MJ  NP MJ          Post glide of the fibula head    MJ  MJ  MJ MJ                      Neuro Re-Ed             clamshell  20 30 30         SLR std    15 abd and ext  15          Bridges  Try NV  NV  10x2  Wit hband  30          HR/TR  20 20 25          Er into wall with foot supports   5''x10  Band laying down           Slider    3 way  3 way 5x          Side step    3 laps  3 lpad                       Ther Ex             LAQ  30 30 30         Bike              hamcurl  30 30  30         biodex blaance/   3x med LOS  3x LOS lv 10  LOS lv 10 3x                                                              Ther Activity                                       Gait Training                                       Modalities

## 2022-02-04 ENCOUNTER — OFFICE VISIT (OUTPATIENT)
Dept: PHYSICAL THERAPY | Facility: OTHER | Age: 82
End: 2022-02-04
Payer: COMMERCIAL

## 2022-02-04 DIAGNOSIS — M25.561 ACUTE PAIN OF RIGHT KNEE: Primary | ICD-10-CM

## 2022-02-04 PROCEDURE — 97140 MANUAL THERAPY 1/> REGIONS: CPT | Performed by: PHYSICAL THERAPIST

## 2022-02-04 PROCEDURE — 97110 THERAPEUTIC EXERCISES: CPT | Performed by: PHYSICAL THERAPIST

## 2022-02-04 PROCEDURE — 97112 NEUROMUSCULAR REEDUCATION: CPT | Performed by: PHYSICAL THERAPIST

## 2022-02-04 NOTE — PROGRESS NOTES
Daily Note     Today's date: 2022  Patient name: Aleksander Uriarte  : 1940  MRN: [id number]  Referring provider: Eliza Mar, *  Dx:   Encounter Diagnosis     ICD-10-CM    1  Acute pain of right knee  M25 561                   Discuss POC plan to F>U in 1 5 weeks to check progress possible D/C at this time  Objective: See treatment diary below      Assessment: Tolerated treatment well  Patient demonstrated fatigue post treatment      Plan: Continue per plan of care  Precautions:  v  Manuals 12 30 21 1 10 21 1 27 22 2 2 2 4         Laser lateral knee distal ITB  MJ MJ  MJ MJ  MJ          KT tape post glid of ITB and fib MJ MJ  NP MJ  MJ          Post glide of the fibula head    MJ  MJ  MJ MJ MJ                       Neuro Re-Ed              clamshell  20 30 30 30         SLR std    15 abd and ext  15  15          Bridges  Try NV  NV  10x2  Wit hband  30  30          HR/TR  20 20 25  25          Er into wall with foot supports   5''x10  Band laying down            Slider    3 way  3 way 5x  3 way 5x          Side step    3 laps  3 lpad  3 lpad                        Ther Ex              LAQ  30 30 30 30         Bike               hamcurl  30 30  30 30         biodex blaance/   3x med LOS  3x LOS lv 10  LOS lv 10 3x  LOS lv 10 3x                                                                  Ther Activity                                          Gait Training                                          Modalities

## 2022-02-15 ENCOUNTER — OFFICE VISIT (OUTPATIENT)
Dept: PHYSICAL THERAPY | Facility: OTHER | Age: 82
End: 2022-02-15
Payer: COMMERCIAL

## 2022-02-15 DIAGNOSIS — M25.561 ACUTE PAIN OF RIGHT KNEE: Primary | ICD-10-CM

## 2022-02-15 PROCEDURE — 97110 THERAPEUTIC EXERCISES: CPT | Performed by: PHYSICAL THERAPIST

## 2022-02-15 PROCEDURE — 97112 NEUROMUSCULAR REEDUCATION: CPT | Performed by: PHYSICAL THERAPIST

## 2022-02-15 PROCEDURE — 97140 MANUAL THERAPY 1/> REGIONS: CPT | Performed by: PHYSICAL THERAPIST

## 2022-02-15 NOTE — PROGRESS NOTES
Daily Note     Today's date: 2/15/2022  Patient name: Harjinder Dolan  : 1940  MRN: [id number]  Referring provider: Edita Pierre, *  Dx:   No diagnosis found  She will attempts some streghting on the steps using a cane to dewieght to make it pain free  We viraj see her 1-2 more time to asses for more progress  Objective: See treatment diary below      Assessment: Tolerated treatment well  Patient demonstrated fatigue post treatment      Plan: Continue per plan of care  Precautions:  v  Manuals 12 30 21 1 10 21 1 27 22 2 2 2 4 214 22        Laser lateral knee distal ITB  MJ MJ  MJ MJ  MJ  MJ         KT tape post glid of ITB and fib MJ MJ  NP MJ  MJ  MJ        Post glide of the fibula head    MJ  MJ  MJ MJ MJ MJ                      Neuro Re-Ed              clamshell  20 30 30 30 30        SLR std    15 abd and ext  15  15  15x         Bridges  Try NV  NV  10x2  Wit hband  30  30  30         HR/TR  20 20 25  25  30         Er into wall with foot supports   5''x10  Band laying down            Slider    3 way  3 way 5x  3 way 5x  5x         Side step    3 laps  3 lpad  3 lpad                        Ther Ex              LAQ  30 30 30 30 30         Bike               hamcurl  30 30  30 30 30         biodex blaance/   3x med LOS  3x LOS lv 10  LOS lv 10 3x  LOS lv 10 3x  3x                                                                 Ther Activity                                          Gait Training                                          Modalities

## 2022-02-22 ENCOUNTER — APPOINTMENT (OUTPATIENT)
Dept: PHYSICAL THERAPY | Facility: OTHER | Age: 82
End: 2022-02-22
Payer: COMMERCIAL

## 2022-03-01 ENCOUNTER — APPOINTMENT (OUTPATIENT)
Dept: PHYSICAL THERAPY | Facility: OTHER | Age: 82
End: 2022-03-01
Payer: COMMERCIAL

## 2022-03-03 ENCOUNTER — OFFICE VISIT (OUTPATIENT)
Dept: OBGYN CLINIC | Facility: OTHER | Age: 82
End: 2022-03-03
Payer: COMMERCIAL

## 2022-03-03 ENCOUNTER — APPOINTMENT (OUTPATIENT)
Dept: RADIOLOGY | Facility: OTHER | Age: 82
End: 2022-03-03
Payer: COMMERCIAL

## 2022-03-03 VITALS
BODY MASS INDEX: 33.79 KG/M2 | HEART RATE: 71 BPM | WEIGHT: 179 LBS | SYSTOLIC BLOOD PRESSURE: 129 MMHG | DIASTOLIC BLOOD PRESSURE: 76 MMHG | HEIGHT: 61 IN

## 2022-03-03 DIAGNOSIS — M17.11 PRIMARY OSTEOARTHRITIS OF RIGHT KNEE: Primary | ICD-10-CM

## 2022-03-03 DIAGNOSIS — M25.561 ACUTE PAIN OF RIGHT KNEE: ICD-10-CM

## 2022-03-03 DIAGNOSIS — M11.20 CHONDROCALCINOSIS: ICD-10-CM

## 2022-03-03 PROCEDURE — 20611 DRAIN/INJ JOINT/BURSA W/US: CPT | Performed by: FAMILY MEDICINE

## 2022-03-03 PROCEDURE — 73562 X-RAY EXAM OF KNEE 3: CPT

## 2022-03-03 PROCEDURE — 1036F TOBACCO NON-USER: CPT | Performed by: FAMILY MEDICINE

## 2022-03-03 PROCEDURE — 99204 OFFICE O/P NEW MOD 45 MIN: CPT | Performed by: FAMILY MEDICINE

## 2022-03-03 PROCEDURE — 1160F RVW MEDS BY RX/DR IN RCRD: CPT | Performed by: FAMILY MEDICINE

## 2022-03-03 RX ADMIN — LIDOCAINE HYDROCHLORIDE 4 ML: 10 INJECTION, SOLUTION INFILTRATION; PERINEURAL at 11:48

## 2022-03-03 RX ADMIN — TRIAMCINOLONE ACETONIDE 40 MG: 40 INJECTION, SUSPENSION INTRA-ARTICULAR; INTRAMUSCULAR at 11:48

## 2022-03-03 RX ADMIN — LIDOCAINE HYDROCHLORIDE 5 ML: 10 INJECTION, SOLUTION INFILTRATION; PERINEURAL at 11:48

## 2022-03-03 NOTE — PROGRESS NOTES
1  Primary osteoarthritis of right knee     2  Acute pain of right knee  CANCELED: XR knee 4+ vw right injury   3  Chondrocalcinosis       Orders Placed This Encounter   Procedures    Large joint arthrocentesis        IMAGING STUDIES: (I personally reviewed images in PACS and report):   xray right knee 3/3/22:  Mild to mod lateral OA with chondrocalcinosis meniscus      PAST REPORTS:        ASSESSMENT/PLAN:  Right Knee Lateral Pain 2' Chondrocalcinosis Lateral Mild OA    Repeat X-ray next visit: None    Return if symptoms worsen or fail to improve  Patient Instructions   Explained the patient that she has significant effusion or joint today which was due to an intra-articular problem  She does have chondrocalcinosis on lateral meniscus with mild osteoarthritis at the site which are likely the culprit for her persistent lateral knee pain  May return for physical therapy next week  Treatment for knee osteoarthritis depends on severity of cartilage wear and pain levels  First line for mild arthritis is exercise to strengthen the knee and allow better joint movement, 5-10% weight loss if overweight, and topical anti-inflammatories such as diclofenac gel or topical analgesic pain relief creams such as capsaicin  Symptoms at this stage usually improve in 3 months  Moderate to severe arthritis or arthritis not responding to first line treatments may require oral medicine such as anti-inflammatories (NSAIDs) such as ibuprofen/motrin, and if failing treatment with oral NSAIDs, then may consider depression medicines such as duloxetine (cymbalta) which also have been shown to work on pain receptors and help to control pain  Other analgesics such as tylenol (acetaminophen) may be used but do not appear to offer significant pain relief  Supplements such as glucosamine and chondroitin supplements   Some studies do show benefit from taking glucosamine sulfate (1500 mg/day) and chondroitin (800 mg/day) but evidence is controversial  I recommend against a type of glucosamine known as "glucosamine hydrochloride" which appears not as effective as glucosamine sulfate  If no improvement with oral medicines, then patients may consider steroid injection into the knee joint which provides pain relief  Steroid injections can be given every 3 months  Any sooner than that can  result in possible deterioration or cartilage in your joint  Other injections are available such as as viscosupplementation or gel shots into the knee which provide nutrients for the cartilage and can result in pain reduction  These viscosupplementation injections are generally considered every 6 months but are controversial   The [address] recommends against viscosupplementation injection; however, the [employer] [facility] recommends for these injections  Beyond these injections there are other controversial treatments including stem cell as well as platelet rich plasma injection which are out of pocket usually up to a 1000 dollars per injection  Lastly, if you fail conservative measures and have persistent pain, then we may consider referral to surgeon for knee replacement  (OUMAR Martin 2018)  __________________________________________________________________________    CHIEF COMPLAINT:  Right leg and knee pain    HPI:  Sandra Love is a [de-identified] y o  female  who presents for       Visit 03/03/2022:  Atraumatic right leg pain ongoing for 2 months  Points to lateral aspect of the leg at the lateral joint line as well as at the lateral femoral condyle in just proximal to lateral femoral condyle  Intermittent pain worse with climbing stairs squatting lifting  Has been treatment physical therapy for distal IT band syndrome  She did have relief but now worse in the last few weeks            Review of Systems      Following history reviewed and update:    Past Medical History:   Diagnosis Date    Anterior tibialis tendonitis of left leg     Cervical cancer (HCC)     Shingles      Past Surgical History:   Procedure Laterality Date    BLADDER SURGERY      HALLUX VALGUS CORRECTION Right     TOTAL ABDOMINAL HYSTERECTOMY  1987    Cervical CA     Social History   Social History     Substance and Sexual Activity   Alcohol Use Yes    Alcohol/week: 1 0 standard drink    Types: 1 Glasses of wine per week    Comment: socially     Social History     Substance and Sexual Activity   Drug Use Never     Social History     Tobacco Use   Smoking Status Never Smoker   Smokeless Tobacco Never Used     Family History   Problem Relation Age of Onset    No Known Problems Mother     Heart disease Father     Heart disease Brother     Diabetes Brother     No Known Problems Son     No Known Problems Daughter     No Known Problems Daughter     Alcohol abuse Neg Hx     Substance Abuse Neg Hx     Mental illness Neg Hx     Depression Neg Hx      Allergies   Allergen Reactions    Codeine Other (See Comments) and Tachycardia    Iodine - Food Allergy Other (See Comments)    Shellfish-Derived Products - Food Allergy           Physical Exam  /76 (BP Location: Right arm, Patient Position: Sitting, Cuff Size: Adult)   Pulse 71   Ht 5' 1" (1 549 m)   Wt 81 2 kg (179 lb)   BMI 33 82 kg/m²     Constitutional:  see vital signs  Gen: well-developed, normocephalic/atraumatic, well-groomed  Eyes: No inflammation or discharge of conjunctiva or lids; sclera clear   Pharynx: no inflammation, lesion, or mass of lips  Neck: supple, no masses, non-distended  MSK: no inflammation, lesion, mass, or clubbing of nails and digits except for other than mentioned below  SKIN: no visible rashes or skin lesions  Pulmonary/Chest: Effort normal  No respiratory distress     NEURO: cranial nerves grossly intact  PSYCH:  Alert and oriented to person, place, and time; recent and remote memory intact; mood normal, no depression, anxiety, or agitation, judgment and insight good and intact     Ortho Exam  RIGHT KNEE:  Erythema: no  Swelling: +2-3 grossly  Increased Warmth: no  Tenderness: +crepitus lateral joint line and tenderness  Flexion: intact  Extension: intact  Lachman's: negative  Drawer: negative  Varus laxity: negative  Valgus laxity: negative  [facility]: +pain crepitus latera joint  Thessaly Test:  Dial Test:  Encinas's test negative       __________________________________________________________________________  Large joint arthrocentesis: R knee  Ducor Protocol:  Procedure performed by: (Dr Keeley Vargas)  Consent: Verbal consent obtained  Risks and benefits: risks, benefits and alternatives were discussed  Consent given by: patient  Time out: Immediately prior to procedure a "time out" was called to verify the correct patient, procedure, equipment, support staff and site/side marked as required    Site marked: the operative site was marked  Patient identity confirmed: verbally with patient    Supporting Documentation  Indications: pain and diagnostic evaluation   Procedure Details  Location: knee - R knee  Preparation: Patient was prepped and draped in the usual sterile fashion  Needle size: 22 G  Ultrasound guidance: yes  Approach: anterolateral  Medications administered: 4 mL lidocaine 1 %; 40 mg triamcinolone acetonide 40 mg/mL; 5 mL lidocaine 1 %    Aspirate amount: 20 mL  Aspirate: yellow and clear    Patient tolerance: patient tolerated the procedure well with no immediate complications  Dressing:  Sterile dressing applied

## 2022-03-03 NOTE — PATIENT INSTRUCTIONS
Explained the patient that she has significant effusion or joint today which was due to an intra-articular problem  She does have chondrocalcinosis on lateral meniscus with mild osteoarthritis at the site which are likely the culprit for her persistent lateral knee pain  May return for physical therapy next week  Treatment for knee osteoarthritis depends on severity of cartilage wear and pain levels  First line for mild arthritis is exercise to strengthen the knee and allow better joint movement, 5-10% weight loss if overweight, and topical anti-inflammatories such as diclofenac gel or topical analgesic pain relief creams such as capsaicin  Symptoms at this stage usually improve in 3 months  Moderate to severe arthritis or arthritis not responding to first line treatments may require oral medicine such as anti-inflammatories (NSAIDs) such as ibuprofen/motrin, and if failing treatment with oral NSAIDs, then may consider depression medicines such as duloxetine (cymbalta) which also have been shown to work on pain receptors and help to control pain  Other analgesics such as tylenol (acetaminophen) may be used but do not appear to offer significant pain relief  Supplements such as glucosamine and chondroitin supplements  Some studies do show benefit from taking glucosamine sulfate (1500 mg/day) and chondroitin (800 mg/day) but evidence is controversial  I recommend against a type of glucosamine known as "glucosamine hydrochloride" which appears not as effective as glucosamine sulfate  If no improvement with oral medicines, then patients may consider steroid injection into the knee joint which provides pain relief  Steroid injections can be given every 3 months  Any sooner than that can  result in possible deterioration or cartilage in your joint       Other injections are available such as as viscosupplementation or gel shots into the knee which provide nutrients for the cartilage and can result in pain reduction  These viscosupplementation injections are generally considered every 6 months but are controversial   The [address] recommends against viscosupplementation injection; however, the [employer] [facility] recommends for these injections  Beyond these injections there are other controversial treatments including stem cell as well as platelet rich plasma injection which are out of pocket usually up to a 1000 dollars per injection  Lastly, if you fail conservative measures and have persistent pain, then we may consider referral to surgeon for knee replacement  (OUMAR Barros 2018)

## 2022-03-09 ENCOUNTER — APPOINTMENT (OUTPATIENT)
Dept: PHYSICAL THERAPY | Facility: OTHER | Age: 82
End: 2022-03-09
Payer: COMMERCIAL

## 2022-03-11 ENCOUNTER — OFFICE VISIT (OUTPATIENT)
Dept: PHYSICAL THERAPY | Facility: OTHER | Age: 82
End: 2022-03-11
Payer: COMMERCIAL

## 2022-03-11 DIAGNOSIS — M25.561 ACUTE PAIN OF RIGHT KNEE: Primary | ICD-10-CM

## 2022-03-11 PROCEDURE — 97110 THERAPEUTIC EXERCISES: CPT | Performed by: PHYSICAL THERAPIST

## 2022-03-11 PROCEDURE — 97140 MANUAL THERAPY 1/> REGIONS: CPT | Performed by: PHYSICAL THERAPIST

## 2022-03-11 PROCEDURE — 97112 NEUROMUSCULAR REEDUCATION: CPT | Performed by: PHYSICAL THERAPIST

## 2022-03-11 NOTE — PROGRESS NOTES
PT Re-Evaluation     Today's date: 3/11/2022  Patient name: Angelita Marc  : 1940  MRN: [id number]  Referring provider: Valentin Villarreal, *  Dx: No diagnosis found  Assessment  Assessment details: Angelita Marc is a pleasant [de-identified] y o  female who presents with R sided lateral knee pain started4-5  months ago she reported that  It is limited with IADL;s and has pian iwyash stiars  She reported she relies heavily on a railing  She is starting to use a SPC in the community  She had PT for several weeks with some relief she eventual had a set back  And a fall, she eventually get an injection  required a injection which helped significantly  She reported that she noted it mostly at night she was rubbing her knee    she has been trying to get healthy in the past year  She would  Like to be able to walking for exercise  Or be able to do the fadumo at home  Pain is mostly along the distal ITB  HEP issued and POC reviewed  Impairments: abnormal coordination, abnormal muscle firing, abnormal or restricted ROM, activity intolerance, impaired physical strength, lacks appropriate home exercise program, pain with function and poor body mechanics    Symptom irritability: moderateUnderstanding of Dx/Px/POC: good   Prognosis: good    Goals  Short Term:  Pt will report decreased levels of pain by at least 2 subjective ratings in 4 weeks  Pt will demonstrate improved ROM by at least 10 degrees in 4 weeks  Pt will demonstrate improved strength by 1/2 grade  Long Term:   Pt will be independent in their HEP in 8 weeks  Pt will be be pain free with IADL's  Pt will demonstrate improved FOTO, > 80         Plan  Plan details: Prognosis above is given PT services 2x/week tapering to 1x/week over the next 3 months and home program adherence    Patient would benefit from: skilled physical therapy  Planned modality interventions: thermotherapy: hydrocollator packs  Planned therapy interventions: activity modification, joint mobilization, manual therapy, motor coordination training, neuromuscular re-education, patient education, self care, therapeutic activities, therapeutic exercise, graded activity and home exercise program  Frequency: 2x week  Treatment plan discussed with: patient        Subjective Evaluation    Pain  At best pain ratin  At worst pain rating: 3    Patient Goals  Patient goals for therapy: decreased pain, independence with ADLs/IADLs, return to sport/leisure activities, increased motion and increased strength          Objective     General Comments:      Knee Comments              Knee: post drawer =-  Lachmans test=  - anterior draw test= -   Valgus stress test=- varus stress test =-   Samuel test   =-   joint line tenderness Lateral   Patella grind test= -Patella mobility test=    -  darryl's test=   -step up and over test=   functional squat= pain lateral knee   Knee MMT Flexion: extension:  Hip MMT:  3+/5 MMT all ways R L hip 4/ 5  Knee ROM flex and extension: full ROM     Mild swelling noted today  Precautions:      v  Manuals 12 30 21 1 10 21 1 27 22 2 2 2 4 214 22 3 11 22       Laser lateral knee distal ITB  MJ MJ  MJ MJ  MJ  MJ  MJ       KT tape post glid of ITB and fib MJ MJ  NP MJ  MJ  MJ        Post glide of the fibula head    MJ  MJ  MJ MJ MJ MJ MJ                     Neuro Re-Ed              clamshell  20 30 30 30 30        SLR std    15 abd and ext  15  15  15x         Bridges  Try NV  NV  10x2  Wit hband  30  30  30  10x2 gtb ER        HR/TR  20 20 25  25  30  20       Er into wall with foot supports   5''x10  Band laying down            Slider    3 way  3 way 5x  3 way 5x  5x  3x        Side step    3 laps  3 lpad  3 lpad   3 laps                      Ther Ex              LAQ  30 30 30 30 30  30        Bike               hamcurl  30 30  30 30 30  30       biodex blaance/   3x med LOS  3x LOS lv 10  LOS lv 10 3x  LOS lv 10 3x  3x  3x LOS 10x Ther Activity                                          Gait Training                                          Modalities

## 2022-03-14 ENCOUNTER — OFFICE VISIT (OUTPATIENT)
Dept: PHYSICAL THERAPY | Facility: OTHER | Age: 82
End: 2022-03-14
Payer: COMMERCIAL

## 2022-03-14 DIAGNOSIS — M25.561 ACUTE PAIN OF RIGHT KNEE: Primary | ICD-10-CM

## 2022-03-14 PROCEDURE — 97112 NEUROMUSCULAR REEDUCATION: CPT | Performed by: PHYSICAL THERAPIST

## 2022-03-14 PROCEDURE — 97110 THERAPEUTIC EXERCISES: CPT | Performed by: PHYSICAL THERAPIST

## 2022-03-14 PROCEDURE — 97140 MANUAL THERAPY 1/> REGIONS: CPT | Performed by: PHYSICAL THERAPIST

## 2022-03-14 NOTE — PROGRESS NOTES
Daily Note     Today's date: 3/14/2022  Patient name: Misael Payton  : 1940  MRN: [id number]  Referring provider: Iza Tapia, *  Dx:   Encounter Diagnosis     ICD-10-CM    1  Acute pain of right knee  M25 561                   Subjective: sore and feeling swollen today  Some releif with laser  She was able to tolerate resuming the bike  Objective: See treatment diary below      Assessment: Tolerated treatment well  Patient demonstrated fatigue post treatment      Plan: Continue per plan of care  Precautions:      Manuals 2 2 2 4 214 22 3 11 22 3 14 22      Laser lateral knee distal ITB  MJ  MJ  MJ  MJ MJ      KT tape post glid of ITB and fib MJ  MJ  MJ        Post glide of the fibula head    MJ MJ MJ MJ MJ                 Neuro Re-Ed           clamshell 30 30 30  gtb 10x2       SLR std  15  15  15x   15       Bridges  30  30  30  10x2 gtb ER  10x2 gtb       HR/TR 25  25  30  20 20      Marching on foam            Slider  3 way 5x  3 way 5x  5x  3x  3x       Side step  3 lpad  3 lpad   3 laps                   Ther Ex           LAQ 30 30 30  30  30      Bike      3min AAROM      hamcurl 30 30 30  30 30      biodex blaance/  LOS lv 10 3x  LOS lv 10 3x  3x  3x LOS 10x  Los 3x 10x       SLS                                             Ther Activity                                 Gait Training                                 Modalities

## 2022-03-15 RX ORDER — LIDOCAINE HYDROCHLORIDE 10 MG/ML
5 INJECTION, SOLUTION INFILTRATION; PERINEURAL
Status: COMPLETED | OUTPATIENT
Start: 2022-03-03 | End: 2022-03-03

## 2022-03-15 RX ORDER — TRIAMCINOLONE ACETONIDE 40 MG/ML
40 INJECTION, SUSPENSION INTRA-ARTICULAR; INTRAMUSCULAR
Status: COMPLETED | OUTPATIENT
Start: 2022-03-03 | End: 2022-03-03

## 2022-03-15 RX ORDER — LIDOCAINE HYDROCHLORIDE 10 MG/ML
4 INJECTION, SOLUTION INFILTRATION; PERINEURAL
Status: COMPLETED | OUTPATIENT
Start: 2022-03-03 | End: 2022-03-03

## 2022-03-17 ENCOUNTER — APPOINTMENT (OUTPATIENT)
Dept: PHYSICAL THERAPY | Facility: OTHER | Age: 82
End: 2022-03-17
Payer: COMMERCIAL

## 2022-03-21 ENCOUNTER — APPOINTMENT (OUTPATIENT)
Dept: PHYSICAL THERAPY | Facility: OTHER | Age: 82
End: 2022-03-21
Payer: COMMERCIAL

## 2022-03-24 ENCOUNTER — APPOINTMENT (OUTPATIENT)
Dept: PHYSICAL THERAPY | Facility: OTHER | Age: 82
End: 2022-03-24
Payer: COMMERCIAL

## 2022-03-30 NOTE — PATIENT INSTRUCTIONS
Explained to Patient that she has tenderness over the medial femoral condyle  I recommend getting an MRI to rule any fracture  We have ordered Visco supplement for her arthritis  Follow-up after the MRI has been performed  Treatment for knee osteoarthritis depends on severity of cartilage wear and pain levels  First line for mild arthritis is exercise to strengthen the knee and allow better joint movement, 5-10% weight loss if overweight, and topical anti-inflammatories such as diclofenac gel or topical analgesic pain relief creams such as capsaicin  Symptoms at this stage usually improve in 3 months  Moderate to severe arthritis or arthritis not responding to first line treatments may require oral medicine such as anti-inflammatories (NSAIDs) such as ibuprofen/motrin, and if failing treatment with oral NSAIDs, then may consider depression medicines such as duloxetine (cymbalta) which also have been shown to work on pain receptors and help to control pain  Other analgesics such as tylenol (acetaminophen) may be used but do not appear to offer significant pain relief  Supplements such as glucosamine and chondroitin supplements  Some studies do show benefit from taking glucosamine sulfate (1500 mg/day) and chondroitin (800 mg/day) but evidence is controversial  I recommend against a type of glucosamine known as "glucosamine hydrochloride" which appears not as effective as glucosamine sulfate  If no improvement with oral medicines, then patients may consider steroid injection into the knee joint which provides pain relief  Steroid injections can be given every 3 months  Any sooner than that can  result in possible deterioration or cartilage in your joint  Other injections are available such as as viscosupplementation or gel shots into the knee which provide nutrients for the cartilage and can result in pain reduction    These viscosupplementation injections are generally considered every 6 months but are controversial   The [address] recommends against viscosupplementation injection; however, the [employer] [facility] recommends for these injections  Beyond these injections there are other controversial treatments including stem cell as well as platelet rich plasma injection which are out of pocket usually up to a 1000 dollars per injection  Lastly, if you fail conservative measures and have persistent pain, then we may consider referral to surgeon for knee replacement  (OUMAR Flood 2018)

## 2022-03-31 ENCOUNTER — OFFICE VISIT (OUTPATIENT)
Dept: OBGYN CLINIC | Facility: OTHER | Age: 82
End: 2022-03-31
Payer: COMMERCIAL

## 2022-03-31 VITALS
DIASTOLIC BLOOD PRESSURE: 80 MMHG | BODY MASS INDEX: 33.79 KG/M2 | HEIGHT: 61 IN | SYSTOLIC BLOOD PRESSURE: 159 MMHG | WEIGHT: 179 LBS | HEART RATE: 80 BPM

## 2022-03-31 DIAGNOSIS — M25.561 RIGHT KNEE PAIN, UNSPECIFIED CHRONICITY: ICD-10-CM

## 2022-03-31 DIAGNOSIS — M17.11 PRIMARY OSTEOARTHRITIS OF RIGHT KNEE: Primary | ICD-10-CM

## 2022-03-31 PROCEDURE — 99214 OFFICE O/P EST MOD 30 MIN: CPT | Performed by: FAMILY MEDICINE

## 2022-04-06 ENCOUNTER — RA CDI HCC (OUTPATIENT)
Dept: OTHER | Facility: HOSPITAL | Age: 82
End: 2022-04-06

## 2022-04-09 ENCOUNTER — IMMUNIZATIONS (OUTPATIENT)
Dept: FAMILY MEDICINE CLINIC | Facility: HOSPITAL | Age: 82
End: 2022-04-09

## 2022-04-09 PROCEDURE — 0054A COVID-19 PFIZER VACC TRIS-SUCROSE GRAY CAP 0.3 ML: CPT

## 2022-04-09 PROCEDURE — 91305 COVID-19 PFIZER VACC TRIS-SUCROSE GRAY CAP 0.3 ML: CPT

## 2022-04-11 ENCOUNTER — TELEPHONE (OUTPATIENT)
Dept: OBGYN CLINIC | Facility: HOSPITAL | Age: 82
End: 2022-04-11

## 2022-04-11 NOTE — TELEPHONE ENCOUNTER
Dr Siria Keating from [address] [place] [address] is calling with immediate findings of the xray of the right knee  Call transferred to nurse

## 2022-04-11 NOTE — TELEPHONE ENCOUNTER
Report faxed - Insufficiency fx of the tib plat  Please advise if patient should be NWB until seen in office for bracing?

## 2022-04-12 NOTE — TELEPHONE ENCOUNTER
Patient given above information and asked about bracing  She is not currently braced  Is this needed?

## 2022-04-12 NOTE — TELEPHONE ENCOUNTER
Spoke with patient and advised that bracing will happened at scheduled appt 4/29  She states that Hal Garza is working on a sooner appt for her  She wondered if a knee scooter would work for her but advised it would aggravate the R knee  Advised maybe she could check into a Rollator walker that has a seat to be able to wheel around the house while seated  She will look into this  She will call office if this is something she would like to request an RX for

## 2022-04-12 NOTE — TELEPHONE ENCOUNTER
Patient called back again and I did let her know that she has a insuffiency fx of tib plateau  Advised her to be NWB with assistive device until Dr Lutz Stalls in on how to proceed  Awaiting response

## 2022-04-14 ENCOUNTER — OFFICE VISIT (OUTPATIENT)
Dept: OBGYN CLINIC | Facility: OTHER | Age: 82
End: 2022-04-14
Payer: COMMERCIAL

## 2022-04-14 VITALS
HEIGHT: 61 IN | HEART RATE: 80 BPM | DIASTOLIC BLOOD PRESSURE: 72 MMHG | SYSTOLIC BLOOD PRESSURE: 122 MMHG | BODY MASS INDEX: 33.61 KG/M2 | WEIGHT: 178 LBS

## 2022-04-14 DIAGNOSIS — M84.469A INSUFFICIENCY FRACTURE OF TIBIA, INITIAL ENCOUNTER: Primary | ICD-10-CM

## 2022-04-14 DIAGNOSIS — M17.11 PRIMARY OSTEOARTHRITIS OF RIGHT KNEE: ICD-10-CM

## 2022-04-14 PROCEDURE — 3078F DIAST BP <80 MM HG: CPT | Performed by: FAMILY MEDICINE

## 2022-04-14 PROCEDURE — 1036F TOBACCO NON-USER: CPT | Performed by: FAMILY MEDICINE

## 2022-04-14 PROCEDURE — 1160F RVW MEDS BY RX/DR IN RCRD: CPT | Performed by: FAMILY MEDICINE

## 2022-04-14 PROCEDURE — 3074F SYST BP LT 130 MM HG: CPT | Performed by: FAMILY MEDICINE

## 2022-04-14 PROCEDURE — 99214 OFFICE O/P EST MOD 30 MIN: CPT | Performed by: FAMILY MEDICINE

## 2022-04-14 NOTE — PATIENT INSTRUCTIONS
Discussed with patient that she does have an insufficiency fracture of her medial tibial plateau  Weight-bearing as tolerated  We will giver medial knee  brace today, she already has a rolling walker  We will order Visco supplement injection, and administered under ultrasound guidance once approved  Treatment for knee osteoarthritis depends on severity of cartilage wear and pain levels  First line for mild arthritis is exercise to strengthen the knee and allow better joint movement, 5-10% weight loss if overweight, and topical anti-inflammatories such as diclofenac gel or topical analgesic pain relief creams such as capsaicin  Symptoms at this stage usually improve in 3 months  Moderate to severe arthritis or arthritis not responding to first line treatments may require oral medicine such as anti-inflammatories (NSAIDs) such as ibuprofen/motrin, and if failing treatment with oral NSAIDs, then may consider depression medicines such as duloxetine (cymbalta) which also have been shown to work on pain receptors and help to control pain  Other analgesics such as tylenol (acetaminophen) may be used but do not appear to offer significant pain relief  Supplements such as glucosamine and chondroitin supplements  Some studies do show benefit from taking glucosamine sulfate (1500 mg/day) and chondroitin (800 mg/day) but evidence is controversial  I recommend against a type of glucosamine known as "glucosamine hydrochloride" which appears not as effective as glucosamine sulfate  If no improvement with oral medicines, then patients may consider steroid injection into the knee joint which provides pain relief  Steroid injections can be given every 3 months  Any sooner than that can  result in possible deterioration or cartilage in your joint       Other injections are available such as as viscosupplementation or gel shots into the knee which provide nutrients for the cartilage and can result in pain reduction  These viscosupplementation injections are generally considered every 6 months but are controversial   The [address] recommends against viscosupplementation injection; however, the [employer] [facility] recommends for these injections  Beyond these injections there are other controversial treatments including stem cell as well as platelet rich plasma injection which are out of pocket usually up to a 1000 dollars per injection  Lastly, if you fail conservative measures and have persistent pain, then we may consider referral to surgeon for knee replacement  (OUMAR Cortez 2018)

## 2022-04-14 NOTE — PROGRESS NOTES
1  Insufficiency fracture of tibia, initial encounter  Medial  Knee Brace    Injection Procedure Prior Authorization    Brace    Durable Medical Equipment   2  Primary osteoarthritis of right knee  Medial  Knee Brace    Injection Procedure Prior Authorization    Brace    Durable Medical Equipment     Orders Placed This Encounter   Procedures    Medial  Knee Brace    Brace    Durable Medical Equipment    Injection Procedure Prior Authorization        Imaging Studies (I personally reviewed images in PACS and report):    MRI of right knee 04/11/2022:  1  Small to moderate size right knee joint effusion with a moderate sized effusion in the semimembranosus gastrocnemius bursa  2  Evidence of significant bone marrow edema in the femoral condyle and tibial plateau compatible with significant bone bruising and or stress reaction     3  Insufficiency fracture of the medial tibial plateau without displacement or depression  4  Extensive degeneration and degenerative tears of the medial and lateral meniscus with a tear through the central root of the posterior horn medial meniscus    X-ray of right knee 03/03/2022: Mild tricompartmental osteoarthritis  IMPRESSION:  Right knee Tibia plateau insufficiency stress fracture  Right knee Tricompartmental osteoarthritis    Previous injections  [place] guided corticosteroid injection to right knee on 03/03/2022      Repeat X-ray next visit: None    Return for Ultrasound-guided Visco injection  Patient Instructions   Discussed with patient that she does have an insufficiency fracture of her medial tibial plateau  Weight-bearing as tolerated  We will giver medial knee  brace today, she already has a rolling walker  We will order Visco supplement injection, and administered under ultrasound guidance once approved  Treatment for knee osteoarthritis depends on severity of cartilage wear and pain levels   First line for mild arthritis is exercise to strengthen the knee and allow better joint movement, 5-10% weight loss if overweight, and topical anti-inflammatories such as diclofenac gel or topical analgesic pain relief creams such as capsaicin  Symptoms at this stage usually improve in 3 months  Moderate to severe arthritis or arthritis not responding to first line treatments may require oral medicine such as anti-inflammatories (NSAIDs) such as ibuprofen/motrin, and if failing treatment with oral NSAIDs, then may consider depression medicines such as duloxetine (cymbalta) which also have been shown to work on pain receptors and help to control pain  Other analgesics such as tylenol (acetaminophen) may be used but do not appear to offer significant pain relief  Supplements such as glucosamine and chondroitin supplements  Some studies do show benefit from taking glucosamine sulfate (1500 mg/day) and chondroitin (800 mg/day) but evidence is controversial  I recommend against a type of glucosamine known as "glucosamine hydrochloride" which appears not as effective as glucosamine sulfate  If no improvement with oral medicines, then patients may consider steroid injection into the knee joint which provides pain relief  Steroid injections can be given every 3 months  Any sooner than that can  result in possible deterioration or cartilage in your joint  Other injections are available such as as viscosupplementation or gel shots into the knee which provide nutrients for the cartilage and can result in pain reduction  These viscosupplementation injections are generally considered every 6 months but are controversial   The [address] recommends against viscosupplementation injection; however, the [employer] [facility] recommends for these injections       Beyond these injections there are other controversial treatments including stem cell as well as platelet rich plasma injection which are out of pocket usually up to a 1000 dollars per injection  Lastly, if you fail conservative measures and have persistent pain, then we may consider referral to surgeon for knee replacement  (UTD Farragut Oumar 2018)  CHIEF COMPLAINT:  Follow-up right knee    HPI:  Dakota Gaytan is a [de-identified] y o  female  who presents for       Visit 4/14/2022 :  Patient here for follow-up for her right knee  She was last examined on 03/31/2022 for this  There she was noted to have tenderness over her medial femoral condyle does an MRI was ordered for further evaluation  Today patient reports still having pain at her medial knee  She was able to get her rolling walker from a neighbor and has been using this with some improvement of her pain  She has had no new injury  Review of Systems   Constitutional: Negative for chills and fever  HENT: Negative for ear pain and sore throat  Eyes: Negative for pain and visual disturbance  Respiratory: Negative for cough and shortness of breath  Cardiovascular: Negative for chest pain and palpitations  Gastrointestinal: Negative for abdominal pain and vomiting  Genitourinary: Negative for dysuria and hematuria  Musculoskeletal: Negative for arthralgias and back pain  Skin: Negative for color change and rash  Neurological: Negative for seizures and syncope  All other systems reviewed and are negative          Following history reviewed and update:    Past Medical History:   Diagnosis Date    Anterior tibialis tendonitis of left leg     Cervical cancer ([address] )     Shingles      Past Surgical History:   Procedure Laterality Date    BLADDER SURGERY      HALLUX VALGUS CORRECTION Right     TOTAL ABDOMINAL HYSTERECTOMY  1987    Cervical CA     Social History   Social History     Substance and Sexual Activity   Alcohol Use Yes    Alcohol/week: 1 0 standard drink    Types: 1 Glasses of wine per week    Comment: socially     Social History     Substance and Sexual Activity   Drug Use Never     Social History     Tobacco Use   Smoking Status Never Smoker   Smokeless Tobacco Never Used     Family History   Problem Relation Age of Onset    No Known Problems Mother     Heart disease Father     Heart disease Brother     Diabetes Brother     No Known Problems Son     No Known Problems Daughter     No Known Problems Daughter     Alcohol abuse Neg Hx     Substance Abuse Neg Hx     Mental illness Neg Hx     Depression Neg Hx      Allergies   Allergen Reactions    Codeine Other (See Comments) and Tachycardia    Iodine - Food Allergy Other (See Comments)    Shellfish-Derived Products - Food Allergy           Physical Exam  /72 (BP Location: Right arm, Patient Position: Sitting, Cuff Size: Adult)   Pulse 80   Ht 5' 1" (1 549 m)   Wt 80 7 kg (178 lb)   BMI 33 63 kg/m²     Constitutional:  see vital signs  Gen: well-developed, normocephalic/atraumatic, well-groomed  Eyes: No inflammation or discharge of conjunctiva or lids; sclera clear   Pharynx: no inflammation, lesion, or mass of lips  Neck: supple, no masses, non-distended  MSK: no inflammation, lesion, mass, or clubbing of nails and digits except for other than mentioned below  SKIN: no visible rashes or skin lesions  Pulmonary/Chest: Effort normal  No respiratory distress     NEURO: cranial nerves grossly intact  PSYCH:  Alert and oriented to person, place, and time; recent and remote memory intact; mood normal, no depression, anxiety, or agitation, judgment and insight good and intact     Ortho Exam    RIGHT KNEE:  Erythema: no  Swelling: no  Increased Warmth: no  Tenderness: + Tibial plateau and femoral condyle medially  Flexion: intact  Extension: intact  Patellar Displacement:  Patellar Tilt:  Patellar Apprehension: negative  Patellar Grind Barrett's: negative  Lachman's: negative  Drawer: negative  Varus laxity: negative  Valgus laxity: negative  [facility]: negative   Thessaly Test:  Dial Charlee    Procedures

## 2022-04-18 ENCOUNTER — TELEPHONE (OUTPATIENT)
Dept: OBGYN CLINIC | Facility: OTHER | Age: 82
End: 2022-04-18

## 2022-04-18 NOTE — TELEPHONE ENCOUNTER
Patient is asking if there is any chance she can get 35 hours of home health care to her home?     CB # [id number]     Please advise    Thanks

## 2022-04-20 NOTE — TELEPHONE ENCOUNTER
Patient is calling to check on the status of this   She stated it is hard for her to get around and get herself meals and would really benefit from home health        CB: [phone]

## 2022-05-23 ENCOUNTER — TELEPHONE (OUTPATIENT)
Dept: OBGYN CLINIC | Facility: MEDICAL CENTER | Age: 82
End: 2022-05-23

## 2022-05-23 NOTE — TELEPHONE ENCOUNTER
Patient sees Dr Chiquis Branch  Patient is calling to set appointment for ultrasound guided injection in right knee  Please give her her a call to schedule       # [phone]

## 2022-05-24 NOTE — TELEPHONE ENCOUNTER
Patient called in asking to schedule her visco injection  It looks like its approved? Is this something you can help schedule for patient?

## 2022-05-26 NOTE — TELEPHONE ENCOUNTER
Returned call  No answer  Lvm  Just waiting to hear back from our [facility] team and then I can call her to schedule

## 2022-05-27 NOTE — TELEPHONE ENCOUNTER
Lucrecia Campbell:55 AM    Good Morning, Anything yet? Marija Gastelum8:56 AM    WE WERE WORKING OUT OF THE REFERRAL FROM 03/31/2022      WE SPOKE TO THE PATIENT ON 05/16/2022 SHE S    by Marija Melo8:57 AM    WE SPOKE TO THE PATIENT ON 05/16/2022 SHE STATED SHE FX HER KNEE AND WOULD CALL THE PHARMACY BACK TO PROVIDE CONSENT      WE DO NOT HAVE MEDICATIONS FOR HER CURRENTL    by Sachin Melo:57 AM    WE DO NOT HAVE MEDICATIONS FOR HER CURRENTLY SHE CAN NOT BE SCHEDULED FOR THE VISCO      SHE NEEDS TO Danny Rubinstein [phone] AN    by Sachin Melo:58 AM    SHE NEEDS TO Danny Rubinstein [phone] [address] [facility] so I'll wait to hear from you then  You     by Jenny Borrero:27 AM    Ok so I'll wait to hear from you then  You may want to call her and follow up with her regarding the consent  Thank you for getting back to me  Let me know when I can schedule  Have a very nice weekend

## 2022-06-15 ENCOUNTER — TELEPHONE (OUTPATIENT)
Dept: OBGYN CLINIC | Facility: OTHER | Age: 82
End: 2022-06-15

## 2022-06-16 NOTE — TELEPHONE ENCOUNTER
Patient called me and wanted to schedule USG injection with Dr Nathan Sumner  I did give her the date of 8/9 since that's what I saw as his first available but I told her I would let Michelle Stanton know she did call back and if gail had anything sooner we would call her back

## 2022-06-20 NOTE — TELEPHONE ENCOUNTER
Called and spoke to patient  Had a cancellation for tomorrow morning  Patient scheduled 06/21/22 @ 8:00 am  Patient aware of date, time and location

## 2022-06-21 ENCOUNTER — PROCEDURE VISIT (OUTPATIENT)
Dept: OBGYN CLINIC | Facility: OTHER | Age: 82
End: 2022-06-21
Payer: COMMERCIAL

## 2022-06-21 VITALS
WEIGHT: 178 LBS | HEIGHT: 61 IN | HEART RATE: 75 BPM | BODY MASS INDEX: 33.61 KG/M2 | SYSTOLIC BLOOD PRESSURE: 121 MMHG | DIASTOLIC BLOOD PRESSURE: 75 MMHG

## 2022-06-21 DIAGNOSIS — M17.11 PRIMARY OSTEOARTHRITIS OF RIGHT KNEE: Primary | ICD-10-CM

## 2022-06-21 PROCEDURE — 20611 DRAIN/INJ JOINT/BURSA W/US: CPT | Performed by: FAMILY MEDICINE

## 2022-06-21 RX ADMIN — LIDOCAINE HYDROCHLORIDE 5 ML: 10 INJECTION, SOLUTION INFILTRATION; PERINEURAL at 08:40

## 2022-06-21 NOTE — PROGRESS NOTES
1  Primary osteoarthritis of right knee  Large joint arthrocentesis: R knee     Orders Placed This Encounter   Procedures    Large joint arthrocentesis: R knee        Imaging Studies (I personally reviewed images in PACS and report):    Past Studies:  MRI of right knee 04/11/2022:  1  Small to moderate size right knee joint effusion with a moderate sized effusion in the semimembranosus gastrocnemius bursa  2  Evidence of significant bone marrow edema in the femoral condyle and tibial plateau compatible with significant bone bruising and or stress reaction     3  Insufficiency fracture of the medial tibial plateau without displacement or depression  4  Extensive degeneration and degenerative tears of the medial and lateral meniscus with a tear through the central root of the posterior horn medial meniscus     X-ray of right knee 03/03/2022: Mild tricompartmental osteoarthritis  IMPRESSION:  Right knee tibial plateau insufficiency fracture with significant osteoarthritis   Ultrasound guided right knee viscous injection 06/24/2022     Repeat X-ray next visit: None    Return if symptoms worsen or fail to improve  Patient Instructions   Please wear brace six additional weeks  Weightbearing as tolerated  red flags and risks of injection include but are not limited to infection <0 072% as referenced in some sources, nerve or artery penetration, and if steroids are used-skin dimpling <1%, hypo-pigmentation <1%  Recommended no submerging underwater in a tub, pool, ocean, lake, jacuzzi, hot tub, or any other body of water for 1 week until needle wound closes due to risk of infection  May take showers  Clean needle site with soap and water and keep covered at all times with sterile bandage such as a band-aid until fully healed   Educated if any symptoms including fevers, chills, swelling, or worsening symptoms occur then to call office or go to hospital for immediate care if physician unavailable due to possible infection or other complication which is a serious medical problem  Patient expressed understanding and agreed to proceed with procedure  CHIEF COMPLAINT:  Right knee tibial plateau insufficiency fracture with significant osteoarthritis follow up     HPI:  Pauline Christina is a 81 y o  female  who presents for right knee pain  Last seen 04/14/2022 reviewed right knee tibial plateau insufficiency fracture with significant osteoarthritis  Patient provided medial  brace and continue non weightbearing with rolling walking  Visit 6/24/2022 : Today she reports mild to moderate right knee pain  Described as aching intermittently throughout the day  Pointing right knee  Today patient reports resolution of medial knee pain  Denies acute trauma  No locking  No instability  Review of Systems   Constitutional: Negative for chills and fever  HENT: Negative for ear pain and sore throat  Eyes: Negative for pain and visual disturbance  Respiratory: Negative for cough and shortness of breath  Cardiovascular: Negative for chest pain and palpitations  Gastrointestinal: Negative for abdominal pain and vomiting  Genitourinary: Negative for dysuria and hematuria  Musculoskeletal: Negative for arthralgias and back pain  Skin: Negative for color change and rash  Neurological: Negative for seizures and syncope  All other systems reviewed and are negative          Following history reviewed and update:    Past Medical History:   Diagnosis Date    Anterior tibialis tendonitis of left leg     Cervical cancer ([facility] [address] )     Shingles      Past Surgical History:   Procedure Laterality Date    BLADDER SURGERY      HALLUX VALGUS CORRECTION Right     TOTAL ABDOMINAL HYSTERECTOMY  1987    Cervical CA     Social History   Social History     Substance and Sexual Activity   Alcohol Use Yes    Alcohol/week: 1 0 standard drink    Types: 1 Glasses of wine per week    Comment: socially     Social History     Substance and Sexual Activity   Drug Use Never     Social History     Tobacco Use   Smoking Status Never Smoker   Smokeless Tobacco Never Used     Family History   Problem Relation Age of Onset    No Known Problems Mother     Heart disease Father     Heart disease Brother     Diabetes Brother     No Known Problems Son     No Known Problems Daughter     No Known Problems Daughter     Alcohol abuse Neg Hx     Substance Abuse Neg Hx     Mental illness Neg Hx     Depression Neg Hx      Allergies   Allergen Reactions    Codeine Other (See Comments) and Tachycardia    Iodine - Food Allergy Other (See Comments)    Shellfish-Derived Products - Food Allergy           Physical Exam  /75 (BP Location: Left arm, Patient Position: Sitting, Cuff Size: Adult)   Pulse 75   Ht 5' 1" (1 549 m)   Wt 80 7 kg (178 lb)   BMI 33 63 kg/m²     Constitutional:  see vital signs  Gen: well-developed, normocephalic/atraumatic, well-groomed  Eyes: No inflammation or discharge of conjunctiva or lids; sclera clear   Pharynx: no inflammation, lesion, or mass of lips  Neck: supple, no masses, non-distended  MSK: no inflammation, lesion, mass, or clubbing of nails and digits except for other than mentioned below  SKIN: no visible rashes or skin lesions  Pulmonary/Chest: Effort normal  No respiratory distress     NEURO: cranial nerves grossly intact  PSYCH:  Alert and oriented to person, place, and time; recent and remote memory intact; mood normal, no depression, anxiety, or agitation, judgment and insight good and intact     Ortho Exam  Erythema: no  Swelling: no  Increased Warmth: no  Tenderness: non tender   Flexion: intact  Extension: intact  Patellar Displacement:  Patellar Tilt:  Patellar Apprehension: negative  Patellar Grind Barrett's: negative  Lachman's: negative  Drawer: negative  Varus laxity: negative  Valgus laxity: negative  Guillermina: negative     Large joint arthrocentesis: R knee  Universal Protocol:  Procedure performed by: (Dr Lam Shaver)  Consent: Verbal consent obtained  Risks and benefits: risks, benefits and alternatives were discussed  Consent given by: patient  Time out: Immediately prior to procedure a "time out" was called to verify the correct patient, procedure, equipment, support staff and site/side marked as required  Patient understanding: patient states understanding of the procedure being performed  Patient consent: the patient's understanding of the procedure matches consent given  Site marked: the operative site was marked  Radiology Images displayed and confirmed   If images not available, report reviewed: imaging studies available  Required items: required blood products, implants, devices, and special equipment available  Patient identity confirmed: verbally with patient    Supporting Documentation  Indications: pain   Procedure Details  Location: knee - R knee  Preparation: Patient was prepped and draped in the usual sterile fashion  Needle size: 16 G  Ultrasound guidance: yes  Approach: lateral  Medications administered: 5 mL lidocaine 1 %; 3 mL sodium hyaluronate 60 MG/3ML    Patient tolerance: patient tolerated the procedure well with no immediate complications  Dressing:  Sterile dressing applied

## 2022-06-21 NOTE — PATIENT INSTRUCTIONS
Please wear brace six additional weeks  Weightbearing as tolerated  red flags and risks of injection include but are not limited to infection <0 072% as referenced in some sources, nerve or artery penetration, and if steroids are used-skin dimpling <1%, hypo-pigmentation <1%  Recommended no submerging underwater in a tub, pool, ocean, lake, jacuzzi, hot tub, or any other body of water for 1 week until needle wound closes due to risk of infection  May take showers  Clean needle site with soap and water and keep covered at all times with sterile bandage such as a band-aid until fully healed  Educated if any symptoms including fevers, chills, swelling, or worsening symptoms occur then to call office or go to hospital for immediate care if physician unavailable due to possible infection or other complication which is a serious medical problem  Patient expressed understanding and agreed to proceed with procedure

## 2022-06-24 RX ORDER — LIDOCAINE HYDROCHLORIDE 10 MG/ML
5 INJECTION, SOLUTION INFILTRATION; PERINEURAL
Status: COMPLETED | OUTPATIENT
Start: 2022-06-21 | End: 2022-06-21

## 2022-08-16 NOTE — TELEPHONE ENCOUNTER
[facility] -  PROGRESS NOTE               Author: Bebeto Gregory M.D. Date & Time: 8/16/2022  10:49 AM     HPI:  82yoF with PMH significant for ESRD on HD MWF via left arm AVF, HTN, DM II, Anemia secondary to CKD, CKD Bone mineral disorder, admitted with increased edema and weakness and having missed her last last 2-3 outpt HD treatments. Pt is very lethargic at this time and unable to provide much history, majority of the history was obtained through review of the medical record and discussion with primary svc. Pt had reported increased LE edema and fatigue and weakness and worsening of her left arm edema so she came to the ED for evaluation. She apparently has missed her last 2-3 outpt dialysis treatments. Per regular outpt Nephrologist Dr. Gates, she has been non-compliant with her fluid restriction and was told that she needed 4x/week dialysis until her volume status could be optimized but she was non-compliant with that recommendation. She has edema of her left arm where her AVF is located and there is concern for a central stenosis that could be the etiology. She had an appointment at the outpt access center to evaluate for central stenosis and undergo angioplasty if needed on 6/9/22 but pt did not go to the appointment. She has not had any other procedures done to the arm in the past couple of months. She had a left arm us done today that showed no DVT and patent AVF and soft tissue edema. She apparently received pain medication fentanyl and dilaudid as well as benadryl earlier today and she is very drowsy.     DAILY NEPHROLOGY SUMMARY:  **See previous note from 7/31/22 to review prior daily nephrology summary entries.  8/1: Laying in bed eating breakfast. Very tearful this AM, stated that she has to make a decision if she wants to continue with the biopsy on her hand. Pt is concerned that there may not be much we can do about the wound on her left hand. Dr Simon from wound care at  Please inform home health care order placed    Home health team will evaluate case and reach out to patient with details bedside. Will return this afternoon for biopsy procedure per MD. HD today UF 2L, tolerated well. Denies SOB, CP,N/VD. K+ up this am 5.7.   8/2: Resting in bed. C/o fatigue. Reports improved SOB, remains on oxygen.   08/3: Sitting up in chair, states she feels good today, SOB improved , remains on 2L nasal cannula. Denies CP, N/V/D. Discussed about fluid restriction. Pending left hand biopsy results. K+ improved 5.4. States fatigue but due to she recently ambulated with RN, otherwise no complaints. No overnight events, VSS.   08/4: Pt laying in bed. AOX4, HD yesterday tolerated will UF 2800mL. Hypotensive on the last hr of HD reported SBP 89-90's but asymptomatic. BP this am stable. Denies any CP/SOB/N/V/D. Patient on RA. C/o constipation. No overnight events. States she has declined hospice but would like to go to a home care facility.   8/5: iHD today, net UF 1L.  No resting comfortably no complaints. VSS  8/6: Had iHD yesterday with 1 L UF.  Currently sleeping, but awakens easily and has no new complaints. VSS.   8/7: Lying in bed sleeping, awakens and has no new complaints, just fatigue. VSS. Due for iHD tomorrow.  8/8: Seen on HD this am. No acute distress. VSS.   8/9: 2.5 net UF. VSS. Resting in bed. C/o fatigue. Denies CP/SOB.   8/10: 1L net UF. No complaints. VSS.   8/11: Resting comfortably.  Family at bedside.  VSS.  Family reports she had been having pain, feels gabapentin may need to be increased.  No other complaints.   8/12: Seen on HD this am. No complaints.   8/13: iHD yesterday net UF 2.5L.  VSS 3L NC resting comfortably today.  No complaints.   8/14: Sleeping in bed, arouses. VSS. No overnight events.   8/15: Seen on dialysis at bedside.  Denies SOB.  Having back pain.  Chest pain last night, not complaining of at this time  8/16 - Complains of weakness, denies pain or SOB.  Had BM.  HD done yesterday    REVIEW OF SYSTEMS:    10 point ROS reviewed and is as per HPI/daily summary or otherwise  "negative    PMH/PSH/SH/FH: Reviewed and unchanged since admission note  CURRENT MEDICATIONS: Reviewed from admission to present day    VS:  /60   Pulse 76   Temp 36.9 °C (98.4 °F) (Temporal)   Resp 15   Ht 1.626 m (5' 4\")   Wt 60.6 kg (133 lb 9.6 oz)   SpO2 93%   BMI 22.93 kg/m²   Physical Exam  Vitals and nursing note reviewed.   Constitutional:       General: She is not in acute distress.     Appearance: She is ill-appearing.   HENT:      Head: Normocephalic and atraumatic.      Mouth/Throat:      Mouth: Mucous membranes are dry.   Eyes:      General: No scleral icterus.  Cardiovascular:      Rate and Rhythm: Normal rate and regular rhythm.      Pulses: Normal pulses.      Heart sounds: Normal heart sounds. Murmur heard.    No friction rub. No gallop.   Pulmonary:      Effort: Pulmonary effort is normal. No respiratory distress.      Breath sounds: No wheezing or rales.      Comments: Decrease breathsounds Bilateral lower lobes  Abdominal:      General: Bowel sounds are normal. There is no distension.      Palpations: Abdomen is soft.      Tenderness: There is no abdominal tenderness. There is no guarding.   Musculoskeletal:         General: Swelling (left arm) or deformity, wrapped. Erythema 1st/4th digits.      Cervical back: Normal range of motion.      Right lower leg: Trace edema.      Left lower leg: Trace edema.      Comments:   LUE AVF + bruit and Thrill   Skin:     General: Skin is warm and dry.      Coloration: Skin is not pale.      Findings: No rash.   Left hand wrapped in gauze   Neurological:      Mental Status: She is alert and oriented to person, place, and time.   Psychiatric:         Mood and Affect: Mood normal.         Behavior: Behavior normal    Fluids:  In: 620 [P.O.:120; Dialysis:500]  Out: 3500     LABS:  Recent Labs     08/14/22  2148   SODIUM 135   POTASSIUM 6.1*   CHLORIDE 94*   CO2 25   GLUCOSE 125*   BUN 59*   CREATININE 5.51*   CALCIUM 8.9         IMPRESSION:  # ESRD, " dependent on HD              - HD via LUE AVF             - HD qMWF  # Fluid overload, improved             - Secondary to non-compliance with fluid restriction and HD treatments  # Left arm edema, slow improvement              - CTA upper ext 6/26 w/o e/o focal stenosis + extensive edema             - AVF patent on left arm US negative for DVT             - Pt no showed to outpt appt to evaluate              - Vascular does not recommend intervention              - Extensor tendon laceration left hand with wound             - Wound care in place, s/p biopsy 8/1             - Left hand skin biopsy negative for calciphylaxis   - Outpatient [facility] appt rescheduled for next month  # HTN, variable control, stable at this time             - Goal BP < 140/90             - Not on BP meds              - BP at goal  # Anemia of CKD, at goal              - Goal Hgb 10-11             - Iron 27             -TIBC 142             -%Sat 19             - Ferritin 1419  # CKD-MBD             - Vit D 35            - PTH 99.4             - Managed at OP unit             - On sevelamer with meals  # Hyperkalemia            - Correct w/ HD  # DM II--management per primary svc  # Leg Pain--chronic skin changes and subcutaneous tissue firm to touch             - Vascular does not recommend any intervention             - On ropinirole   # Leukocytosis resolved  # Hyponatremia  - Likely fluid related, manage w/ HD  - Fluid Restriction  - Resolved  # Hypoalbuminemia  - No dietary protein restrictions    PLAN:  - Continue qMWF iHD schedule, Next iHD due MON  - UF as tolerated  - Continue phos binders WM, hold if patient is NPO.  - Continue off of all BP meds and diuretics for now. BP stable during HD.   - Limit sedating meds if possible  - No dietary protein restrictions  - Fluid restriction 1.2 L   - Low potassium diet  - Dose all meds per ESRD  - Max dose gabapentin in ESRD pt 300mg/day  - Outpt access center appointment rescheduled  -  Follow labs  - DC planning underway for [facility]         Thank you,

## 2022-08-23 ENCOUNTER — OFFICE VISIT (OUTPATIENT)
Dept: PHYSICAL THERAPY | Facility: OTHER | Age: 82
End: 2022-08-23
Payer: COMMERCIAL

## 2022-08-23 DIAGNOSIS — M25.532 LEFT WRIST PAIN: Primary | ICD-10-CM

## 2022-08-23 PROCEDURE — 97110 THERAPEUTIC EXERCISES: CPT | Performed by: PHYSICAL THERAPIST

## 2022-08-23 PROCEDURE — 97161 PT EVAL LOW COMPLEX 20 MIN: CPT | Performed by: PHYSICAL THERAPIST

## 2022-08-23 NOTE — PROGRESS NOTES
PT Evaluation     Today's date: 2022  Patient name: Kristin Stahl  : 1940  MRN: [id number]  Referring provider: Dontrell Sheffield, *  Dx: No diagnosis found  Assessment  Assessment details: Kristin Stahl is a pleasant 81 y o  female who presents with L hand and wrist pain starting while she was using a RW for her knee  She stopped using the RW about 2 month  She reported that she has pain with pushing up from a chair  She reported some difficulty with gripping and lifting and carrying  She reported that pain has been about the same since 2 months ago  Based of the thumb and into the wrist  No night pain  she feels weakness in the L hand   at times  HEP issued and POC reviewed  Impairments: abnormal coordination, abnormal muscle firing, abnormal or restricted ROM, activity intolerance, impaired physical strength, lacks appropriate home exercise program, pain with function and poor body mechanics    Symptom irritability: moderateUnderstanding of Dx/Px/POC: good   Prognosis: good    Goals  Short Term:  Pt will report decreased levels of pain by at least 2 subjective ratings in 4 weeks  Pt will demonstrate improved ROM by at least 10 degrees in 4 weeks  Pt will demonstrate improved strength by 1/2 grade  Long Term:   Pt will be independent in their HEP in 8 weeks  Pt will be be pain free with IADL's  Pt will demonstrate improved FOTO, > 80         Plan  Plan details: Prognosis above is given PT services 2x/week tapering to 1x/week over the next 3 months and home program adherence    Patient would benefit from: skilled physical therapy  Planned modality interventions: thermotherapy: hydrocollator packs  Planned therapy interventions: activity modification, joint mobilization, manual therapy, motor coordination training, neuromuscular re-education, patient education, self care, therapeutic activities, therapeutic exercise, graded activity and home exercise program  Frequency: 2x week  Treatment plan discussed with: patient        Subjective Evaluation    Pain  At best pain ratin  At worst pain ratin    Patient Goals  Patient goals for therapy: decreased pain, independence with ADLs/IADLs, return to sport/leisure activities, increased motion and increased strength         strength R=30 L = 20    phallens test and reverse  = -  Tinel sign at Cubital tunnel = - Tinel medial elbow= -              good L upper extremity dexterity no intrinsic mms wasting or weakness  Froments=- alys sign=  -TFCC load test= -  TFCC circumduction test -    Wrist ROM 55 flex 50 ext  reprudituon of [pain with pushing up form a chair  TTP thmb abductors        no laxity thumb and wrist        Precautions: NONe       Manuals             Laser              Carpal mobilizations                                        Neuro Re-Ed                                                                                                        Ther Ex                                                                                                                     Ther Activity                                       Gait Training                                       Modalities

## 2022-09-08 DIAGNOSIS — F32.A ANXIETY AND DEPRESSION: ICD-10-CM

## 2022-09-08 DIAGNOSIS — F41.9 ANXIETY AND DEPRESSION: ICD-10-CM

## 2022-09-08 DIAGNOSIS — I10 BENIGN HYPERTENSION: ICD-10-CM

## 2022-09-08 RX ORDER — BUPROPION HYDROCHLORIDE 150 MG/1
150 TABLET, EXTENDED RELEASE ORAL 2 TIMES DAILY
Qty: 180 TABLET | Refills: 0 | Status: SHIPPED | OUTPATIENT
Start: 2022-09-08

## 2022-09-08 RX ORDER — AMLODIPINE BESYLATE AND BENAZEPRIL HYDROCHLORIDE 5; 10 MG/1; MG/1
1 CAPSULE ORAL DAILY
Qty: 90 CAPSULE | Refills: 0 | Status: SHIPPED | OUTPATIENT
Start: 2022-09-08

## 2022-09-09 ENCOUNTER — OFFICE VISIT (OUTPATIENT)
Dept: PHYSICAL THERAPY | Facility: OTHER | Age: 82
End: 2022-09-09
Payer: COMMERCIAL

## 2022-09-09 DIAGNOSIS — M25.532 LEFT WRIST PAIN: Primary | ICD-10-CM

## 2022-09-09 PROCEDURE — 97140 MANUAL THERAPY 1/> REGIONS: CPT | Performed by: PHYSICAL THERAPIST

## 2022-09-09 PROCEDURE — 97112 NEUROMUSCULAR REEDUCATION: CPT | Performed by: PHYSICAL THERAPIST

## 2022-09-09 NOTE — PROGRESS NOTES
Daily Note     Today's date: 2022  Patient name: Carol Willis  : 1940  MRN: [id number]  Referring provider: Camilo Ashford, *  Dx:   Encounter Diagnosis     ICD-10-CM    1  Left wrist pain  M25 532                   Subjective: looser after todays session  She has sallie pretty good about doing HEP  Next follow up in 2 weeks  Objective: See treatment diary below      Assessment: Tolerated treatment well  Patient demonstrated fatigue post treatment      Plan: Continue per plan of care            Precautions: NONe       Manuals             Laser  Off cotnact 3min distal wrist and thumb  12 graff             Carpal mobilizations  MJ                                      Neuro Re-Ed             digi flex  Red 20             Finger et  20x red             wrist pronation supination  #1 20             wrist ext  #1 20             Thumb abd  20            thera web finger ext  20 red                          Ther Ex             Wrist flex 10''x10  10''x10            wirst ext WB stretch 10''x10                                                                                           Ther Activity                                       Gait Training                                       Modalities

## 2022-09-23 ENCOUNTER — EVALUATION (OUTPATIENT)
Dept: PHYSICAL THERAPY | Facility: OTHER | Age: 82
End: 2022-09-23
Payer: COMMERCIAL

## 2022-09-23 DIAGNOSIS — M25.532 LEFT WRIST PAIN: Primary | ICD-10-CM

## 2022-09-23 PROCEDURE — 97112 NEUROMUSCULAR REEDUCATION: CPT | Performed by: PHYSICAL THERAPIST

## 2022-09-23 PROCEDURE — 97140 MANUAL THERAPY 1/> REGIONS: CPT | Performed by: PHYSICAL THERAPIST

## 2022-09-23 NOTE — PROGRESS NOTES
Daily Note     Today's date: 2022  Patient name: Marilyn Wise  : 1940  MRN: [id number]  Referring provider: Garrick Khoury, *  Dx:   Encounter Diagnosis     ICD-10-CM    1  Left wrist pain  M25 532                   Subjective: she has improved ROM and strength since starting PT  We updated HEP     D/C from PT at this time  She will call if she feels like she is not progressing  Objective: See treatment diary below      Assessment: Tolerated treatment well  Patient demonstrated fatigue post treatment      Plan: Continue per plan of care            Precautions: NONe       Manuals 9 23 22            Laser  Off cotnact 3min distal wrist and thumb  12 graff             Carpal mobilizations  MJ                                      Neuro Re-Ed             digi flex  Red 20             Finger et  20x red             wrist pronation supination  #1 20             wrist ext  #1 20             Thumb abd  20            thera web finger ext  20 red                          Ther Ex             Wrist flex 10''x10  10''x10            wirst ext WB stretch 10''x10                                                                                           Ther Activity                                       Gait Training                                       Modalities

## 2022-10-06 ENCOUNTER — RA CDI HCC (OUTPATIENT)
Dept: OTHER | Facility: HOSPITAL | Age: 82
End: 2022-10-06

## 2022-10-06 NOTE — PROGRESS NOTES
[facility] [address]  coding opportunities       Chart reviewed, no opportunity found: CHART REVIEWED, NO OPPORTUNITY FOUND        Patients Insurance     Medicare Insurance: Medicare

## 2022-10-13 ENCOUNTER — OFFICE VISIT (OUTPATIENT)
Dept: INTERNAL MEDICINE CLINIC | Facility: CLINIC | Age: 82
End: 2022-10-13
Payer: COMMERCIAL

## 2022-10-13 VITALS
TEMPERATURE: 97 F | SYSTOLIC BLOOD PRESSURE: 110 MMHG | HEIGHT: 61 IN | BODY MASS INDEX: 33.79 KG/M2 | WEIGHT: 179 LBS | OXYGEN SATURATION: 97 % | HEART RATE: 76 BPM | DIASTOLIC BLOOD PRESSURE: 78 MMHG

## 2022-10-13 DIAGNOSIS — I10 BENIGN ESSENTIAL HYPERTENSION: ICD-10-CM

## 2022-10-13 DIAGNOSIS — F41.9 ANXIETY AND DEPRESSION: ICD-10-CM

## 2022-10-13 DIAGNOSIS — Z23 IMMUNIZATION DUE: ICD-10-CM

## 2022-10-13 DIAGNOSIS — E78.5 DYSLIPIDEMIA: ICD-10-CM

## 2022-10-13 DIAGNOSIS — F32.A ANXIETY AND DEPRESSION: ICD-10-CM

## 2022-10-13 DIAGNOSIS — Z00.00 MEDICARE ANNUAL WELLNESS VISIT, SUBSEQUENT: Primary | ICD-10-CM

## 2022-10-13 DIAGNOSIS — J45.30 MILD PERSISTENT ASTHMA WITHOUT COMPLICATION: ICD-10-CM

## 2022-10-13 PROCEDURE — G0439 PPPS, SUBSEQ VISIT: HCPCS | Performed by: NURSE PRACTITIONER

## 2022-10-13 PROCEDURE — 99214 OFFICE O/P EST MOD 30 MIN: CPT | Performed by: NURSE PRACTITIONER

## 2022-10-13 PROCEDURE — 90662 IIV NO PRSV INCREASED AG IM: CPT | Performed by: NURSE PRACTITIONER

## 2022-10-13 PROCEDURE — G0008 ADMIN INFLUENZA VIRUS VAC: HCPCS | Performed by: NURSE PRACTITIONER

## 2022-10-13 NOTE — PROGRESS NOTES
Name: Lala Victoria      : 1940      MRN: [id number]  Encounter Provider: PO Avila  Encounter Date: 10/13/2022   Encounter department: [facility]     1  Immunization due  -     influenza vaccine, high-dose, PF 0 7 mL (FLUZONE HIGH-DOSE)    2  Mild persistent asthma without complication  Assessment & Plan:  Stable  On zafirlukast    Albuterol PRN  Sees an allergist        3  Benign essential hypertension  -     Comprehensive metabolic panel; Future  -     CBC and differential; Future    4  Anxiety and depression  -     TSH, 3rd generation with Free T4 reflex; Future    5  Dyslipidemia  -     Lipid Panel with Direct LDL reflex; Future    6  Medicare annual wellness visit, subsequent         Subjective      Darline Claire is here today for follow up    Right knee, doing exercises at home  She has recently started acupuncture     Left wrist pain      Chronic sinus symptoms since covid last year    Doing ok, down to 1 wellbutrin daily didn't go up to 2     Taking calicum and vit d, doesn't want dexa scan       Review of Systems    Current Outpatient Medications on File Prior to Visit   Medication Sig   • albuterol (Ventolin HFA) 90 mcg/act inhaler Inhale 2 puffs every 6 (six) hours as needed for wheezing   • ALBUTEROL IN Inhale   • ALPRAZolam (XANAX) 0 25 mg tablet Take 1 tablet (0 25 mg total) by mouth daily as needed for anxiety   • amLODIPine-benazepril (LOTREL 5-10) 5-10 MG per capsule Take 1 capsule by mouth daily   • ascorbic acid (VITAMIN C) 1000 MG tablet    • buPROPion (WELLBUTRIN SR) 150 mg 12 hr tablet Take 1 tablet (150 mg total) by mouth 2 (two) times a day   • Chlorpheniramine-DM (CORICIDIN COUGH/COLD) 4-30 MG TABS Take 1 tablet by mouth daily at bedtime as needed (cough)   • Cholecalciferol (VITAMIN D3) 1000 units CAPS Daily   • conjugated estrogens (PREMARIN) vaginal cream Insert 0 5 g into the vagina once a week   • fluticasone (FLONASE) 50 mcg/act nasal spray 2 sprays into each nostril daily for 365 doses   • ipratropium (ATROVENT) 0 03 % nasal spray 2 sprays into each nostril 2 (two) times a day   • Mometasone Furoate (Asmanex HFA) 200 MCG/ACT AERO Inhale 2 puffs (400 mcg total) 2 (two) times a day   • Probiotic Product (Acidophilus) CHEW    • zafirlukast (ACCOLATE) 20 MG tablet Take 1 tablet (20 mg total) by mouth 2 (two) times a day   • EPINEPHrine (EPIPEN) 0 3 mg/0 3 mL SOAJ Inject 0 3 mL (0 3 mg total) into a muscle once for 1 dose       Objective     /78 (BP Location: Left arm, Patient Position: Sitting, Cuff Size: Adult)   Pulse 76   Temp (!) 97 °F (36 1 °C)   Ht 5' 1" (1 549 m)   Wt 81 2 kg (179 lb)   SpO2 97%   BMI 33 82 kg/m²     Physical Exam  PO Reina

## 2022-10-13 NOTE — PROGRESS NOTES
Assessment and Plan:     Problem List Items Addressed This Visit        Respiratory    Mild persistent asthma without complication     Stable  On zafirlukast    Albuterol PRN  Sees an allergist              Cardiovascular and Mediastinum    Benign essential hypertension     Stable  Continue amlodipine-benazepril         Relevant Orders    Comprehensive metabolic panel    CBC and differential       Other    Anxiety and depression     Stable  On wellbutrin once daily         Relevant Orders    TSH, 3rd generation with Free T4 reflex    Dyslipidemia     Due for labs  Relevant Orders    Lipid Panel with Direct LDL reflex      Other Visit Diagnoses     Medicare annual wellness visit, subsequent    -  Primary    Immunization due        Relevant Orders    influenza vaccine, high-dose, PF 0 7 mL (FLUZONE HIGH-DOSE) (Completed)        BMI Counseling: Body mass index is 33 82 kg/m²  The BMI is above normal  Nutrition recommendations include decreasing portion sizes and encouraging healthy choices of fruits and vegetables  Exercise recommendations include exercising 3-5 times per week  Rationale for BMI follow-up plan is due to patient being overweight or obese  Preventive health issues were discussed with patient, and age appropriate screening tests were ordered as noted in patient's After Visit Summary  Personalized health advice and appropriate referrals for health education or preventive services given if needed, as noted in patient's After Visit Summary  History of Present Illness:     Patient presents for a Medicare Wellness Visit    Cynthia Reid is here today for follow up  She is doing ok  She continues to have chronic right knee pain  She is trying to avoid surgery  She is doing exercises at home daily and has recently started acupuncture  She has been using a crutch to get help her with stability  She has had chronic sinus symptoms since having covid last year   She saw her allergist and is taking coricidin as needed  Her anxiety has been stable  She did not increase her wellbutrin dose  Patient Care Team:  Haile Sosa as PCP - General (Internal Medicine)     Review of Systems:     Review of Systems   Constitutional: Negative for activity change, appetite change, fatigue, fever and unexpected weight change  HENT: Positive for congestion, sinus pressure and sinus pain  Eyes: Negative for visual disturbance  Respiratory: Negative for cough, chest tightness, shortness of breath and wheezing  Cardiovascular: Negative for chest pain, palpitations and leg swelling  Gastrointestinal: Negative for abdominal pain, blood in stool, constipation and diarrhea  Genitourinary: Negative for difficulty urinating  Musculoskeletal: Positive for arthralgias  Skin: Negative for rash  Neurological: Negative for dizziness, weakness, light-headedness and headaches  Psychiatric/Behavioral: Negative for sleep disturbance          Problem List:     Patient Active Problem List   Diagnosis   • Asthma   • Benign essential hypertension   • Anxiety and depression   • Symptomatic menopausal or female climacteric states   • Dyslipidemia   • History of cervical cancer   • Lower back pain   • Chronic seasonal allergic rhinitis due to pollen   • Mild persistent asthma without complication   • Allergic conjunctivitis of both eyes   • Allergic rhinitis due to animal (cat) (dog) hair and dander   • Intrinsic atopic dermatitis   • Shellfish allergy   • Laryngopharyngeal reflux   • Anxiety   • Obesity with body mass index 30 or greater   • Pure hypercholesterolemia   • Anterior tibialis tendonitis of left leg   • COVID-19 virus infection      Past Medical and Surgical History:     Past Medical History:   Diagnosis Date   • Anterior tibialis tendonitis of left leg    • Cervical cancer ([facility] [address] )    • Shingles    • Stress fracture of right tibia 04/2022    tibial plateau     Past Surgical History: Procedure Laterality Date   • BLADDER SURGERY     • HALLUX VALGUS CORRECTION Right    • TOTAL ABDOMINAL HYSTERECTOMY  1987    Cervical CA      Family History:     Family History   Problem Relation Age of Onset   • No Known Problems Mother    • Heart disease Father    • Heart disease Brother    • Diabetes Brother    • No Known Problems Son    • No Known Problems Daughter    • No Known Problems Daughter    • Alcohol abuse Neg Hx    • Substance Abuse Neg Hx    • Mental illness Neg Hx    • Depression Neg Hx       Social History:     Social History     Socioeconomic History   • Marital status:      Spouse name: None   • Number of children: 3   • Years of education: None   • Highest education level: None   Occupational History   • Occupation: Retired     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   Tobacco Use   • Smoking status: Never Smoker   • Smokeless tobacco: Never Used   Vaping Use   • Vaping Use: Never used   Substance and Sexual Activity   • Alcohol use: Yes     Alcohol/week: 1 0 standard drink     Types: 1 Glasses of wine per week     Comment: socially   • Drug use: Never   • Sexual activity: Not Currently   Other Topics Concern   • None   Social History Narrative    Single    Retired, advertisement                Marital:   Lives With: Alone  Home Environment: [facility], Uses air , Has a window air conditioner, The basement is dry, Does not use a dehumidifier, The floors are wood, Uses baseboard heating, Uses natural gas heating, [place] [place] Home is [place]-free  Pets: None  Occupation: Retired, [employer]  Personal Habits:Cigarette Use: Never Smoked Cigarettes  Social Determinants of Health     Financial Resource Strain: Low Risk    • Difficulty of Paying Living Expenses: Not hard at all   Food Insecurity: Not on file   Transportation Needs: No Transportation Needs   • Lack of Transportation (Medical): No   • Lack of Transportation (Non-Medical):  No   Physical Activity: Inactive   • Days of Exercise per Week: 0 days   • Minutes of Exercise per Session: 0 min   Stress: Not on file   Social Connections: Not on file   Intimate Partner Violence: Not on file   Housing Stability: Not on file      Medications and Allergies:     Current Outpatient Medications   Medication Sig Dispense Refill   • albuterol (Ventolin HFA) 90 mcg/act inhaler Inhale 2 puffs every 6 (six) hours as needed for wheezing 18 g 3   • ALBUTEROL IN Inhale     • ALPRAZolam (XANAX) 0 25 mg tablet Take 1 tablet (0 25 mg total) by mouth daily as needed for anxiety 10 tablet 0   • amLODIPine-benazepril (LOTREL 5-10) 5-10 MG per capsule Take 1 capsule by mouth daily 90 capsule 0   • ascorbic acid (VITAMIN C) 1000 MG tablet      • buPROPion (WELLBUTRIN SR) 150 mg 12 hr tablet Take 1 tablet (150 mg total) by mouth 2 (two) times a day 180 tablet 0   • Chlorpheniramine-DM (CORICIDIN COUGH/COLD) 4-30 MG TABS Take 1 tablet by mouth daily at bedtime as needed (cough)     • Cholecalciferol (VITAMIN D3) 1000 units CAPS Daily     • conjugated estrogens (PREMARIN) vaginal cream Insert 0 5 g into the vagina once a week 42 5 g 0   • fluticasone (FLONASE) 50 mcg/act nasal spray 2 sprays into each nostril daily for 365 doses 16 g 11   • ipratropium (ATROVENT) 0 03 % nasal spray 2 sprays into each nostril 2 (two) times a day 360 mL 3   • Mometasone Furoate (Asmanex HFA) 200 MCG/ACT AERO Inhale 2 puffs (400 mcg total) 2 (two) times a day 13 g 11   • Probiotic Product (Acidophilus) CHEW      • zafirlukast (ACCOLATE) 20 MG tablet Take 1 tablet (20 mg total) by mouth 2 (two) times a day 180 tablet 3   • EPINEPHrine (EPIPEN) 0 3 mg/0 3 mL SOAJ Inject 0 3 mL (0 3 mg total) into a muscle once for 1 dose 0 6 mL 0     No current facility-administered medications for this visit       Allergies   Allergen Reactions   • Codeine Other (See Comments) and Tachycardia   • Iodine - Food Allergy Other (See Comments)   • Shellfish-Derived Products - Food Allergy       Immunizations:     Immunization History   Administered Date(s) Administered   • COVID-19 PFIZER VACCINE 0 3 ML IM 04/13/2021, 05/06/2021   • COVID-19 Pfizer vac (Toribio-sucrose, gray cap) 12 yr+ IM 04/09/2022   • Fluzone Split Quad 0 5 mL 10/03/2016   • INFLUENZA 10/13/2011, 11/09/2014, 11/20/2014, 10/03/2016, 10/01/2017   • Influenza, high dose seasonal 0 7 mL 10/30/2020, 11/11/2021, 10/13/2022   • Influenza, seasonal, injectable 10/06/2012   • Pneumococcal Conjugate 13-Valent 11/10/2017   • Pneumococcal Polysaccharide PPV23 11/20/2006   • Tdap 02/07/2013   • Zoster 10/20/2013, 07/13/2016   • influenza, trivalent, adjuvanted 10/02/2018, 10/04/2019      Health Maintenance:         Topic Date Due   • Breast Cancer Screening: Mammogram  10/05/2023         Topic Date Due   • COVID-19 Vaccine (4 - Booster for Collazo Peter series) 08/09/2022      Medicare Screening Tests and Risk Assessments:     Marly Carrasco is here for her Subsequent Wellness visit  Last Medicare Wellness visit information reviewed, patient interviewed and updates made to the record today  Health Risk Assessment:   Patient rates overall health as very good  Patient feels that their physical health rating is same  Patient is very satisfied with their life  Eyesight was rated as same  Hearing was rated as same  Patient feels that their emotional and mental health rating is same  Patients states they are never, rarely angry  Patient states they are never, rarely unusually tired/fatigued  Pain experienced in the last 7 days has been some  Patient's pain rating has been 5/10  Patient states that she has experienced no weight loss or gain in last 6 months  Fall Risk Screening: In the past year, patient has experienced: history of falling in past year    Number of falls: 1  Injured during fall?: Yes      Urinary Incontinence Screening:   Patient has leaked urine accidently in the last six months       Home Safety:  Patient has trouble with stairs inside or outside of their home  Home safety hazards include: none  Nutrition:   Current diet is Regular  Medications:   Patient is currently taking over-the-counter supplements  OTC medications include: see medication list  Patient is able to manage medications  Activities of Daily Living (ADLs)/Instrumental Activities of Daily Living (IADLs):   Walk and transfer into and out of bed and chair?: Yes  Dress and groom yourself?: Yes    Bathe or shower yourself?: Yes    Feed yourself? Yes  Do your laundry/housekeeping?: Yes  Manage your money, pay your bills and track your expenses?: Yes  Make your own meals?: Yes    Do your own shopping?: Yes    Previous Hospitalizations:   Any hospitalizations or ED visits within the last 12 months?: No      Advance Care Planning:   Living will: No    Durable POA for healthcare: No    Advanced directive: No      PREVENTIVE SCREENINGS      Cardiovascular Screening:    General: Screening Not Indicated and History Lipid Disorder      Diabetes Screening:     General: Screening Current      Colorectal Cancer Screening:     General: Screening Current      Breast Cancer Screening:     General: Screening Current      Cervical Cancer Screening:    General: Screening Not Indicated and History Cervical Cancer      Osteoporosis Screening:    General: Patient Declines      Abdominal Aortic Aneurysm (AAA) Screening:        General: Screening Not Indicated      Lung Cancer Screening:     General: Screening Not Indicated      Hepatitis C Screening:    General: Screening Current    Screening, Brief Intervention, and Referral to Treatment (SBIRT)    Screening      AUDIT-C Screenin) How often did you have a drink containing alcohol in the past year? monthly or less  2) How many drinks did you have on a typical day when you were drinking in the past year?  1 to 2  3) How often did you have 6 or more drinks on one occasion in the past year? weekly    AUDIT-C Score: 4  Interpretation: Score 3-12 (female): POSITIVE screen for alcohol misuse    AUDIT Screenin) How often during the last year have you found that you were not able to stop drinking once you had started? 0 - never  5) How often during the last year have you failed to do what was normally expected from you because of drinking? 0 - never  6) How often during the last year have you needed a first drink in the morning to get yourself going after a heavy drinking session? 0 - never  7) How often during the last year have you had a feeling of guilt or remorse after drinking? 0 - never  8) How often during the last year have you been unable to remember what happened the night before because you had been drinking? 0 - never  9) Have you or someone else been injured as a result of your drinking? 0 - no  10) Has a relative or friend or a doctor or another health worker been concerned about your drinking or suggested you cut down? 0 - no    AUDIT Score: 4  Interpretation: Low risk alcohol consumption    Single Item Drug Screening:  How often have you used an illegal drug (including marijuana) or a prescription medication for non-medical reasons in the past year? never    Single Item Drug Screen Score: 0  Interpretation: Negative screen for possible drug use disorder    No exam data present     Physical Exam:     /78 (BP Location: Left arm, Patient Position: Sitting, Cuff Size: Adult)   Pulse 76   Temp (!) 97 °F (36 1 °C)   Ht 5' 1" (1 549 m)   Wt 81 2 kg (179 lb)   SpO2 97%   BMI 33 82 kg/m²     Physical Exam  Vitals reviewed  Constitutional:       Appearance: Normal appearance  HENT:      Head: Normocephalic and atraumatic  Mouth/Throat:      Mouth: Mucous membranes are moist       Pharynx: Oropharynx is clear  Eyes:      Conjunctiva/sclera: Conjunctivae normal    Cardiovascular:      Rate and Rhythm: Normal rate and regular rhythm  Heart sounds: Normal heart sounds     Pulmonary:      Effort: Pulmonary effort is normal       Breath sounds: Normal breath sounds  Abdominal:      General: Bowel sounds are normal       Palpations: Abdomen is soft  Musculoskeletal:         General: Normal range of motion  Cervical back: Neck supple  Right lower leg: No edema  Left lower leg: No edema  Skin:     General: Skin is warm and dry  Neurological:      Mental Status: She is alert and oriented to person, place, and time     Psychiatric:         Mood and Affect: Mood normal          Behavior: Behavior normal           PO Rockwell

## 2022-12-05 DIAGNOSIS — I10 BENIGN HYPERTENSION: ICD-10-CM

## 2022-12-05 RX ORDER — AMLODIPINE BESYLATE AND BENAZEPRIL HYDROCHLORIDE 5; 10 MG/1; MG/1
1 CAPSULE ORAL DAILY
Qty: 90 CAPSULE | Refills: 0 | Status: SHIPPED | OUTPATIENT
Start: 2022-12-05

## 2023-03-21 DIAGNOSIS — F41.9 ANXIETY AND DEPRESSION: ICD-10-CM

## 2023-03-21 DIAGNOSIS — I10 BENIGN HYPERTENSION: ICD-10-CM

## 2023-03-21 DIAGNOSIS — F32.A ANXIETY AND DEPRESSION: ICD-10-CM

## 2023-03-21 RX ORDER — BUPROPION HYDROCHLORIDE 150 MG/1
150 TABLET, EXTENDED RELEASE ORAL 2 TIMES DAILY
Qty: 180 TABLET | Refills: 0 | Status: SHIPPED | OUTPATIENT
Start: 2023-03-21

## 2023-03-21 RX ORDER — AMLODIPINE BESYLATE AND BENAZEPRIL HYDROCHLORIDE 5; 10 MG/1; MG/1
1 CAPSULE ORAL DAILY
Qty: 90 CAPSULE | Refills: 0 | Status: SHIPPED | OUTPATIENT
Start: 2023-03-21

## 2023-04-06 ENCOUNTER — RA CDI HCC (OUTPATIENT)
Dept: OTHER | Facility: HOSPITAL | Age: 83
End: 2023-04-06

## 2023-04-06 NOTE — PROGRESS NOTES
[facility]  coding opportunities       Chart reviewed, no opportunity found:   [address]        Patients Insurance     Medicare Insurance: [employer]

## 2023-04-14 PROBLEM — H10.13 ALLERGIC CONJUNCTIVITIS OF BOTH EYES: Status: RESOLVED | Noted: 2019-12-16 | Resolved: 2023-04-14

## 2023-04-14 PROBLEM — E78.00 PURE HYPERCHOLESTEROLEMIA: Status: RESOLVED | Noted: 2019-09-25 | Resolved: 2023-04-14

## 2023-04-14 PROBLEM — U07.1 COVID-19 VIRUS INFECTION: Status: RESOLVED | Noted: 2021-10-20 | Resolved: 2023-04-14

## 2023-04-14 PROBLEM — M17.11 ARTHRITIS OF RIGHT KNEE: Status: ACTIVE | Noted: 2023-04-14

## 2023-04-27 ENCOUNTER — APPOINTMENT (OUTPATIENT)
Dept: LAB | Facility: CLINIC | Age: 83
End: 2023-04-27

## 2023-04-27 DIAGNOSIS — F41.9 ANXIETY AND DEPRESSION: ICD-10-CM

## 2023-04-27 DIAGNOSIS — I10 BENIGN ESSENTIAL HYPERTENSION: ICD-10-CM

## 2023-04-27 DIAGNOSIS — E78.5 DYSLIPIDEMIA: ICD-10-CM

## 2023-04-27 DIAGNOSIS — F32.A ANXIETY AND DEPRESSION: ICD-10-CM

## 2023-04-27 LAB
ALBUMIN SERPL BCP-MCNC: 3.6 G/DL (ref 3.5–5)
ALP SERPL-CCNC: 67 U/L (ref 46–116)
ALT SERPL W P-5'-P-CCNC: 21 U/L (ref 12–78)
ANION GAP SERPL CALCULATED.3IONS-SCNC: 2 MMOL/L (ref 4–13)
AST SERPL W P-5'-P-CCNC: 18 U/L (ref 5–45)
BASOPHILS # BLD AUTO: 0.04 THOUSANDS/ΜL (ref 0–0.1)
BASOPHILS NFR BLD AUTO: 1 % (ref 0–1)
BILIRUB SERPL-MCNC: 0.41 MG/DL (ref 0.2–1)
BUN SERPL-MCNC: 19 MG/DL (ref 5–25)
CALCIUM SERPL-MCNC: 8.9 MG/DL (ref 8.3–10.1)
CHLORIDE SERPL-SCNC: 113 MMOL/L (ref 96–108)
CHOLEST SERPL-MCNC: 223 MG/DL
CO2 SERPL-SCNC: 25 MMOL/L (ref 21–32)
CREAT SERPL-MCNC: 0.68 MG/DL (ref 0.6–1.3)
EOSINOPHIL # BLD AUTO: 0.39 THOUSAND/ΜL (ref 0–0.61)
EOSINOPHIL NFR BLD AUTO: 8 % (ref 0–6)
ERYTHROCYTE [DISTWIDTH] IN BLOOD BY AUTOMATED COUNT: 13.4 % (ref 11.6–15.1)
GFR SERPL CREATININE-BSD FRML MDRD: 82 ML/MIN/1.73SQ M
GLUCOSE P FAST SERPL-MCNC: 95 MG/DL (ref 65–99)
HCT VFR BLD AUTO: 40.7 % (ref 34.8–46.1)
HDLC SERPL-MCNC: 80 MG/DL
HGB BLD-MCNC: 13.8 G/DL (ref 11.5–15.4)
IMM GRANULOCYTES # BLD AUTO: 0.01 THOUSAND/UL (ref 0–0.2)
IMM GRANULOCYTES NFR BLD AUTO: 0 % (ref 0–2)
LDLC SERPL CALC-MCNC: 132 MG/DL (ref 0–100)
LYMPHOCYTES # BLD AUTO: 1.18 THOUSANDS/ΜL (ref 0.6–4.47)
LYMPHOCYTES NFR BLD AUTO: 23 % (ref 14–44)
MCH RBC QN AUTO: 30.4 PG (ref 26.8–34.3)
MCHC RBC AUTO-ENTMCNC: 33.9 G/DL (ref 31.4–37.4)
MCV RBC AUTO: 90 FL (ref 82–98)
MONOCYTES # BLD AUTO: 0.56 THOUSAND/ΜL (ref 0.17–1.22)
MONOCYTES NFR BLD AUTO: 11 % (ref 4–12)
NEUTROPHILS # BLD AUTO: 2.88 THOUSANDS/ΜL (ref 1.85–7.62)
NEUTS SEG NFR BLD AUTO: 57 % (ref 43–75)
NRBC BLD AUTO-RTO: 0 /100 WBCS
PLATELET # BLD AUTO: 276 THOUSANDS/UL (ref 149–390)
PMV BLD AUTO: 10 FL (ref 8.9–12.7)
POTASSIUM SERPL-SCNC: 4.1 MMOL/L (ref 3.5–5.3)
PROT SERPL-MCNC: 7.1 G/DL (ref 6.4–8.4)
RBC # BLD AUTO: 4.54 MILLION/UL (ref 3.81–5.12)
SODIUM SERPL-SCNC: 140 MMOL/L (ref 135–147)
TRIGL SERPL-MCNC: 54 MG/DL
TSH SERPL DL<=0.05 MIU/L-ACNC: 0.98 UIU/ML (ref 0.45–4.5)
WBC # BLD AUTO: 5.06 THOUSAND/UL (ref 4.31–10.16)

## 2023-06-12 DIAGNOSIS — I10 BENIGN HYPERTENSION: ICD-10-CM

## 2023-06-12 RX ORDER — AMLODIPINE BESYLATE AND BENAZEPRIL HYDROCHLORIDE 5; 10 MG/1; MG/1
CAPSULE ORAL
Qty: 90 CAPSULE | Refills: 0 | Status: SHIPPED | OUTPATIENT
Start: 2023-06-12

## 2023-06-19 ENCOUNTER — TELEPHONE (OUTPATIENT)
Dept: INTERNAL MEDICINE CLINIC | Facility: CLINIC | Age: 83
End: 2023-06-19

## 2023-06-19 ENCOUNTER — OFFICE VISIT (OUTPATIENT)
Dept: INTERNAL MEDICINE CLINIC | Facility: CLINIC | Age: 83
End: 2023-06-19
Payer: COMMERCIAL

## 2023-06-19 VITALS
SYSTOLIC BLOOD PRESSURE: 134 MMHG | TEMPERATURE: 98.7 F | WEIGHT: 180 LBS | OXYGEN SATURATION: 97 % | BODY MASS INDEX: 33.99 KG/M2 | HEIGHT: 61 IN | DIASTOLIC BLOOD PRESSURE: 70 MMHG | HEART RATE: 64 BPM

## 2023-06-19 DIAGNOSIS — R35.0 URINARY FREQUENCY: Primary | ICD-10-CM

## 2023-06-19 LAB
BACTERIA UR QL AUTO: ABNORMAL /HPF
BILIRUB UR QL STRIP: NEGATIVE
CLARITY UR: CLEAR
COLOR UR: YELLOW
GLUCOSE UR STRIP-MCNC: NEGATIVE MG/DL
HGB UR QL STRIP.AUTO: NEGATIVE
KETONES UR STRIP-MCNC: NEGATIVE MG/DL
LEUKOCYTE ESTERASE UR QL STRIP: ABNORMAL
NITRITE UR QL STRIP: NEGATIVE
NON-SQ EPI CELLS URNS QL MICRO: ABNORMAL /HPF
PH UR STRIP.AUTO: 5.5 [PH]
PROT UR STRIP-MCNC: NEGATIVE MG/DL
RBC #/AREA URNS AUTO: ABNORMAL /HPF
SL AMB  POCT GLUCOSE, UA: ABNORMAL
SL AMB LEUKOCYTE ESTERASE,UA: 1
SL AMB POCT BILIRUBIN,UA: ABNORMAL
SL AMB POCT BLOOD,UA: ABNORMAL
SL AMB POCT CLARITY,UA: CLEAR
SL AMB POCT COLOR,UA: ABNORMAL
SL AMB POCT KETONES,UA: ABNORMAL
SL AMB POCT NITRITE,UA: ABNORMAL
SL AMB POCT PH,UA: 6
SL AMB POCT SPECIFIC GRAVITY,UA: 1.02
SL AMB POCT URINE PROTEIN: ABNORMAL
SL AMB POCT UROBILINOGEN: ABNORMAL
SP GR UR STRIP.AUTO: 1.01 (ref 1–1.03)
UROBILINOGEN UR STRIP-ACNC: <2 MG/DL
WBC #/AREA URNS AUTO: ABNORMAL /HPF

## 2023-06-19 PROCEDURE — 87077 CULTURE AEROBIC IDENTIFY: CPT | Performed by: NURSE PRACTITIONER

## 2023-06-19 PROCEDURE — 87086 URINE CULTURE/COLONY COUNT: CPT | Performed by: NURSE PRACTITIONER

## 2023-06-19 PROCEDURE — 81002 URINALYSIS NONAUTO W/O SCOPE: CPT | Performed by: NURSE PRACTITIONER

## 2023-06-19 PROCEDURE — 87186 SC STD MICRODIL/AGAR DIL: CPT | Performed by: NURSE PRACTITIONER

## 2023-06-19 PROCEDURE — 81001 URINALYSIS AUTO W/SCOPE: CPT | Performed by: NURSE PRACTITIONER

## 2023-06-19 PROCEDURE — 99213 OFFICE O/P EST LOW 20 MIN: CPT | Performed by: NURSE PRACTITIONER

## 2023-06-19 PROCEDURE — 87147 CULTURE TYPE IMMUNOLOGIC: CPT | Performed by: NURSE PRACTITIONER

## 2023-06-19 RX ORDER — CEPHALEXIN 500 MG/1
500 CAPSULE ORAL EVERY 12 HOURS SCHEDULED
Qty: 10 CAPSULE | Refills: 0 | Status: SHIPPED | OUTPATIENT
Start: 2023-06-19 | End: 2023-06-24

## 2023-06-19 NOTE — PROGRESS NOTES
Name: Alison Velazquez      : 1940      MRN: [id number]  Encounter Provider: PO Carlos  Encounter Date: 2023   Encounter department: [facility]     1  Urinary frequency  -     POCT urine dip  -     UA w Reflex to Microscopic w Reflex to Culture - Clinic Collect  -     cephalexin (KEFLEX) 500 mg capsule; Take 1 capsule (500 mg total) by mouth every 12 (twelve) hours for 5 days           Mihir Gar is here today with complaints of urinary frequency  Symptoms started about 1 week ago  She has associated low back pain and urgency  She feels her urine has had an odor  She denies abdominal pain, fevers, hematuria, or dysuria     Review of Systems   Constitutional: Negative for activity change, appetite change, fatigue and fever  Respiratory: Negative for shortness of breath  Cardiovascular: Negative for chest pain  Gastrointestinal: Negative for abdominal pain, diarrhea and nausea  Genitourinary: Positive for frequency and urgency  Negative for difficulty urinating, dysuria, flank pain and hematuria  Musculoskeletal: Positive for back pain  Neurological: Negative for dizziness and headaches         Current Outpatient Medications on File Prior to Visit   Medication Sig   • albuterol (Ventolin HFA) 90 mcg/act inhaler Inhale 2 puffs every 6 (six) hours as needed for wheezing   • ALPRAZolam (XANAX) 0 25 mg tablet Take 1 tablet (0 25 mg total) by mouth daily as needed for anxiety   • amLODIPine-benazepril (LOTREL 5-10) 5-10 MG per capsule TAKE ONE CAPSULE BY MOUTH EVERY DAY   • ascorbic acid (VITAMIN C) 1000 MG tablet    • buPROPion (WELLBUTRIN SR) 150 mg 12 hr tablet Take 1 tablet (150 mg total) by mouth 2 (two) times a day   • cetirizine (ZyrTEC) 10 mg tablet Take 10 mg by mouth daily   • Chlorpheniramine-DM (CORICIDIN COUGH/COLD) 4-30 MG TABS Take 1 tablet by mouth daily at bedtime as needed (cough)   • Cholecalciferol "(VITAMIN D3) 1000 units CAPS Daily   • conjugated estrogens (PREMARIN) vaginal cream Insert 0 5 g into the vagina once a week   • EPINEPHrine (EPIPEN) 0 3 mg/0 3 mL SOAJ Inject 0 3 mL (0 3 mg total) into a muscle once for 1 dose   • ipratropium (ATROVENT) 0 03 % nasal spray 2 sprays into each nostril 2 (two) times a day   • mometasone (NASONEX) 50 mcg/act nasal spray 2 sprays into each nostril daily   • Mometasone Furoate (Asmanex HFA) 200 MCG/ACT AERO Inhale 2 puffs (400 mcg total) 2 (two) times a day   • Probiotic Product (Acidophilus) CHEW    • zafirlukast (ACCOLATE) 20 MG tablet Take 1 tablet (20 mg total) by mouth 2 (two) times a day   • ALBUTEROL IN Inhale   • mupirocin (BACTROBAN) 2 % ointment Apply topically 2 (two) times a day for 7 days X 7 days to open area       Objective     /70   Pulse 64   Temp 98 7 °F (37 1 °C)   Ht 5' 1\" (1 549 m)   Wt 81 6 kg (180 lb)   SpO2 97%   BMI 34 01 kg/m²     Physical Exam  Vitals reviewed  Constitutional:       Appearance: Normal appearance  HENT:      Head: Normocephalic and atraumatic  Eyes:      Conjunctiva/sclera: Conjunctivae normal    Cardiovascular:      Rate and Rhythm: Normal rate and regular rhythm  Heart sounds: Normal heart sounds  Pulmonary:      Effort: Pulmonary effort is normal       Breath sounds: Normal breath sounds  Abdominal:      General: Bowel sounds are normal       Palpations: Abdomen is soft  Tenderness: There is no abdominal tenderness  Neurological:      Mental Status: She is alert and oriented to person, place, and time     Psychiatric:         Mood and Affect: Mood normal          Behavior: Behavior normal        PO Fernandez  "

## 2023-06-19 NOTE — TELEPHONE ENCOUNTER
Patient called asking for appointment due to frequent urination and low back pain for a week  No fever or additional symptoms  Possible urinary tract infection

## 2023-06-21 LAB
BACTERIA UR CULT: ABNORMAL
BACTERIA UR CULT: ABNORMAL

## 2023-07-19 ENCOUNTER — HOSPITAL ENCOUNTER (EMERGENCY)
Facility: HOSPITAL | Age: 83
Discharge: HOME | End: 2023-07-19
Attending: EMERGENCY MEDICINE
Payer: COMMERCIAL

## 2023-07-19 ENCOUNTER — APPOINTMENT (EMERGENCY)
Dept: RADIOLOGY | Facility: HOSPITAL | Age: 83
End: 2023-07-19
Payer: COMMERCIAL

## 2023-07-19 VITALS
DIASTOLIC BLOOD PRESSURE: 86 MMHG | SYSTOLIC BLOOD PRESSURE: 184 MMHG | WEIGHT: 180 LBS | BODY MASS INDEX: 34.01 KG/M2 | OXYGEN SATURATION: 98 % | TEMPERATURE: 98.2 F | RESPIRATION RATE: 16 BRPM | HEART RATE: 86 BPM

## 2023-07-19 DIAGNOSIS — K80.50 BILIARY COLIC: Primary | ICD-10-CM

## 2023-07-19 DIAGNOSIS — R11.0 NAUSEA: ICD-10-CM

## 2023-07-19 LAB
2HR DELTA HS TROPONIN: 0 NG/L
ALBUMIN SERPL BCP-MCNC: 3.6 G/DL (ref 3.5–5)
ALP SERPL-CCNC: 67 U/L (ref 46–116)
ALT SERPL W P-5'-P-CCNC: 26 U/L (ref 12–78)
ANION GAP SERPL CALCULATED.3IONS-SCNC: 1 MMOL/L
AST SERPL W P-5'-P-CCNC: 27 U/L (ref 5–45)
ATRIAL RATE: 81 BPM
BASOPHILS # BLD AUTO: 0.06 THOUSANDS/ÂΜL (ref 0–0.1)
BASOPHILS NFR BLD AUTO: 1 % (ref 0–1)
BILIRUB SERPL-MCNC: 0.7 MG/DL (ref 0.2–1)
BUN SERPL-MCNC: 20 MG/DL (ref 5–25)
CALCIUM SERPL-MCNC: 9.4 MG/DL (ref 8.3–10.1)
CARDIAC TROPONIN I PNL SERPL HS: 4 NG/L
CARDIAC TROPONIN I PNL SERPL HS: 4 NG/L
CHLORIDE SERPL-SCNC: 112 MMOL/L (ref 96–108)
CO2 SERPL-SCNC: 25 MMOL/L (ref 21–32)
CREAT SERPL-MCNC: 0.67 MG/DL (ref 0.6–1.3)
EOSINOPHIL # BLD AUTO: 0.29 THOUSAND/ÂΜL (ref 0–0.61)
EOSINOPHIL NFR BLD AUTO: 4 % (ref 0–6)
ERYTHROCYTE [DISTWIDTH] IN BLOOD BY AUTOMATED COUNT: 13.7 % (ref 11.6–15.1)
GFR SERPL CREATININE-BSD FRML MDRD: 82 ML/MIN/1.73SQ M
GLUCOSE SERPL-MCNC: 105 MG/DL (ref 65–140)
HCT VFR BLD AUTO: 38.8 % (ref 34.8–46.1)
HGB BLD-MCNC: 12.8 G/DL (ref 11.5–15.4)
IMM GRANULOCYTES # BLD AUTO: 0.04 THOUSAND/UL (ref 0–0.2)
IMM GRANULOCYTES NFR BLD AUTO: 1 % (ref 0–2)
LIPASE SERPL-CCNC: 186 U/L (ref 73–393)
LYMPHOCYTES # BLD AUTO: 0.94 THOUSANDS/ÂΜL (ref 0.6–4.47)
LYMPHOCYTES NFR BLD AUTO: 13 % (ref 14–44)
MCH RBC QN AUTO: 29.6 PG (ref 26.8–34.3)
MCHC RBC AUTO-ENTMCNC: 33 G/DL (ref 31.4–37.4)
MCV RBC AUTO: 90 FL (ref 82–98)
MONOCYTES # BLD AUTO: 0.69 THOUSAND/ÂΜL (ref 0.17–1.22)
MONOCYTES NFR BLD AUTO: 10 % (ref 4–12)
NEUTROPHILS # BLD AUTO: 5.04 THOUSANDS/ÂΜL (ref 1.85–7.62)
NEUTS SEG NFR BLD AUTO: 71 % (ref 43–75)
NRBC BLD AUTO-RTO: 0 /100 WBCS
P AXIS: 57 DEGREES
PLATELET # BLD AUTO: 271 THOUSANDS/UL (ref 149–390)
PMV BLD AUTO: 9.9 FL (ref 8.9–12.7)
POTASSIUM SERPL-SCNC: 4.1 MMOL/L (ref 3.5–5.3)
PR INTERVAL: 222 MS
PROT SERPL-MCNC: 7.6 G/DL (ref 6.4–8.4)
QRS AXIS: -25 DEGREES
QRSD INTERVAL: 102 MS
QT INTERVAL: 406 MS
QTC INTERVAL: 471 MS
RBC # BLD AUTO: 4.32 MILLION/UL (ref 3.81–5.12)
SODIUM SERPL-SCNC: 138 MMOL/L (ref 135–147)
T WAVE AXIS: 109 DEGREES
VENTRICULAR RATE: 81 BPM
WBC # BLD AUTO: 7.06 THOUSAND/UL (ref 4.31–10.16)

## 2023-07-19 PROCEDURE — G1004 CDSM NDSC: HCPCS

## 2023-07-19 PROCEDURE — 93005 ELECTROCARDIOGRAM TRACING: CPT

## 2023-07-19 PROCEDURE — 80053 COMPREHEN METABOLIC PANEL: CPT

## 2023-07-19 PROCEDURE — 99285 EMERGENCY DEPT VISIT HI MDM: CPT | Performed by: EMERGENCY MEDICINE

## 2023-07-19 PROCEDURE — 96375 TX/PRO/DX INJ NEW DRUG ADDON: CPT

## 2023-07-19 PROCEDURE — 99284 EMERGENCY DEPT VISIT MOD MDM: CPT

## 2023-07-19 PROCEDURE — 96376 TX/PRO/DX INJ SAME DRUG ADON: CPT

## 2023-07-19 PROCEDURE — 93010 ELECTROCARDIOGRAM REPORT: CPT | Performed by: INTERNAL MEDICINE

## 2023-07-19 PROCEDURE — 36415 COLL VENOUS BLD VENIPUNCTURE: CPT

## 2023-07-19 PROCEDURE — 96374 THER/PROPH/DIAG INJ IV PUSH: CPT

## 2023-07-19 PROCEDURE — 84484 ASSAY OF TROPONIN QUANT: CPT

## 2023-07-19 PROCEDURE — 85025 COMPLETE CBC W/AUTO DIFF WBC: CPT

## 2023-07-19 PROCEDURE — 71275 CT ANGIOGRAPHY CHEST: CPT

## 2023-07-19 PROCEDURE — 74174 CTA ABD&PLVS W/CONTRAST: CPT

## 2023-07-19 PROCEDURE — 83690 ASSAY OF LIPASE: CPT

## 2023-07-19 RX ORDER — ONDANSETRON 4 MG/1
4 TABLET, FILM COATED ORAL EVERY 8 HOURS PRN
Qty: 12 TABLET | Refills: 0 | Status: SHIPPED | OUTPATIENT
Start: 2023-07-19 | End: 2023-07-23

## 2023-07-19 RX ORDER — ONDANSETRON 2 MG/ML
4 INJECTION INTRAMUSCULAR; INTRAVENOUS ONCE
Status: COMPLETED | OUTPATIENT
Start: 2023-07-19 | End: 2023-07-19

## 2023-07-19 RX ORDER — FENTANYL CITRATE 50 UG/ML
25 INJECTION, SOLUTION INTRAMUSCULAR; INTRAVENOUS ONCE
Status: COMPLETED | OUTPATIENT
Start: 2023-07-19 | End: 2023-07-19

## 2023-07-19 RX ORDER — HYDROMORPHONE HCL/PF 1 MG/ML
0.5 SYRINGE (ML) INJECTION ONCE
Status: COMPLETED | OUTPATIENT
Start: 2023-07-19 | End: 2023-07-19

## 2023-07-19 RX ADMIN — ONDANSETRON 4 MG: 2 INJECTION INTRAMUSCULAR; INTRAVENOUS at 10:35

## 2023-07-19 RX ADMIN — HYDROMORPHONE HYDROCHLORIDE 0.5 MG: 1 INJECTION, SOLUTION INTRAMUSCULAR; INTRAVENOUS; SUBCUTANEOUS at 12:10

## 2023-07-19 RX ADMIN — FENTANYL CITRATE 25 MCG: 50 INJECTION, SOLUTION INTRAMUSCULAR; INTRAVENOUS at 10:35

## 2023-07-19 RX ADMIN — IOHEXOL 100 ML: 350 INJECTION, SOLUTION INTRAVENOUS at 12:04

## 2023-07-19 RX ADMIN — ONDANSETRON 4 MG: 2 INJECTION INTRAMUSCULAR; INTRAVENOUS at 12:10

## 2023-07-19 NOTE — ED ATTENDING ATTESTATION
7/19/2023  I, Esperanza Knox MD, saw and evaluated the patient. I have discussed the patient with the resident/non-physician practitioner and agree with the resident's/non-physician practitioner's findings, Plan of Care, and MDM as documented in the resident's/non-physician practitioner's note, except where noted. All available labs and Radiology studies were reviewed. I was present for key portions of any procedure(s) performed by the resident/non-physician practitioner and I was immediately available to provide assistance. At this point I agree with the current assessment done in the Emergency Department. I have conducted an independent evaluation of this patient a history and physical is as follows:    ED Course         Critical Care Time  Procedures    80 yo female with hx of htn, anxiety, asthma, had epigastric pain radiating to back started at 7 am. No modifying factors. Pt with n/v and episode of diaphoresis. Pt feels pain is worse with eating and concerned about gallbladder. No cp, no sob, no numbness, tingling, in arms and legs. No urinary complaints. Vss, afebrile, moderate distress, lungs cta, rrr, abdomen soft tenderness epigastric, ruq tenderness. Pitting edema bilaterally chronically. Labs, cardiac workup, ekg, cta rule out dissection.

## 2023-07-19 NOTE — ED PROVIDER NOTES
History  Chief Complaint   Patient presents with   • Epigastric Pain     Pt reports epigastric pain radiating around to mid upper back upon waking at 0700 today. "I think it's my gallbladder, they've been watching it." Pt denies SOB, also complains of nausea, denies vomiting/diarrhea. 81-year-old female with past medical history of hypertension, anxiety, asthma brought to the ED by her neighbor for evaluation of acute onset of achy epigastric abdominal pain radiating to the mid back for the past 3 hours. Patient reports she woke up with the pain and have never had pain like this before. Patient notes associated symptoms of nausea and an episode of diaphoresis this morning. Diaphoresis now resolved. Patient reports that she gets abdominal pain with her meals but this pain feels different. Patient denies shortness of breath, chest pain, vomiting, diarrhea, fever, chills, lightheadedness          Prior to Admission Medications   Prescriptions Last Dose Informant Patient Reported? Taking?    ALBUTEROL IN   Yes No   Sig: Inhale   ALPRAZolam (XANAX) 0.25 mg tablet  Self No No   Sig: Take 1 tablet (0.25 mg total) by mouth daily as needed for anxiety   Chlorpheniramine-DM (CORICIDIN COUGH/COLD) 4-30 MG TABS   No No   Sig: Take 1 tablet by mouth daily at bedtime as needed (cough)   Cholecalciferol (VITAMIN D3) 1000 units CAPS  Self Yes No   Sig: Daily   EPINEPHrine (EPIPEN) 0.3 mg/0.3 mL SOAJ  Self No No   Sig: Inject 0.3 mL (0.3 mg total) into a muscle once for 1 dose   Mometasone Furoate (Asmanex HFA) 200 MCG/ACT AERO   No No   Sig: Inhale 2 puffs (400 mcg total) 2 (two) times a day   Probiotic Product (Acidophilus) CHEW  Self Yes No   albuterol (Ventolin HFA) 90 mcg/act inhaler   No No   Sig: Inhale 2 puffs every 6 (six) hours as needed for wheezing   amLODIPine-benazepril (LOTREL 5-10) 5-10 MG per capsule   No No   Sig: TAKE ONE CAPSULE BY MOUTH EVERY DAY   ascorbic acid (VITAMIN C) 1000 MG tablet  Self Yes No buPROPion (WELLBUTRIN SR) 150 mg 12 hr tablet   No No   Sig: Take 1 tablet (150 mg total) by mouth 2 (two) times a day   cetirizine (ZyrTEC) 10 mg tablet   Yes No   Sig: Take 10 mg by mouth daily   conjugated estrogens (PREMARIN) vaginal cream  Self No No   Sig: Insert 0.5 g into the vagina once a week   ipratropium (ATROVENT) 0.03 % nasal spray   No No   Si sprays into each nostril 2 (two) times a day   mometasone (NASONEX) 50 mcg/act nasal spray   No No   Si sprays into each nostril daily   mupirocin (BACTROBAN) 2 % ointment   No No   Sig: Apply topically 2 (two) times a day for 7 days X 7 days to open area   zafirlukast (ACCOLATE) 20 MG tablet   No No   Sig: Take 1 tablet (20 mg total) by mouth 2 (two) times a day      Facility-Administered Medications: None       Past Medical History:   Diagnosis Date   • Anterior tibialis tendonitis of left leg    • Cervical cancer ([address])    • COVID 10/25/2021   • COVID-19 virus infection 10/20/2021   • Shingles    • Stress fracture of right tibia 2022    tibial plateau       Past Surgical History:   Procedure Laterality Date   • BLADDER SURGERY     • HALLUX VALGUS CORRECTION Right    • TOTAL ABDOMINAL HYSTERECTOMY      Cervical CA       Family History   Problem Relation Age of Onset   • No Known Problems Mother    • Heart disease Father    • Heart disease Brother    • Diabetes Brother    • No Known Problems Son    • No Known Problems Daughter    • No Known Problems Daughter    • Alcohol abuse Neg Hx    • Substance Abuse Neg Hx    • Mental illness Neg Hx    • Depression Neg Hx      I have reviewed and agree with the history as documented.     E-Cigarette/Vaping   • E-Cigarette Use Never User      E-Cigarette/Vaping Substances   • Nicotine No    • THC No    • CBD No    • Flavoring No    • Other No    • Unknown No      Social History     Tobacco Use   • Smoking status: Never   • Smokeless tobacco: Never   Vaping Use   • Vaping Use: Never used   Substance Use Topics • Alcohol use: Yes     Alcohol/week: 1.0 standard drink of alcohol     Types: 1 Glasses of wine per week     Comment: socially   • Drug use: Never        Review of Systems   Constitutional: Negative for chills and fever. HENT: Negative for ear pain and sore throat. Eyes: Negative for pain and visual disturbance. Respiratory: Negative for cough and shortness of breath. Cardiovascular: Negative for chest pain and palpitations. Gastrointestinal: Positive for abdominal pain and nausea. Negative for diarrhea and vomiting. Genitourinary: Negative for dysuria and hematuria. Musculoskeletal: Positive for back pain. Negative for arthralgias. Skin: Negative for color change and rash. Neurological: Negative for seizures and syncope. Psychiatric/Behavioral: Negative for agitation and confusion. All other systems reviewed and are negative. Physical Exam  ED Triage Vitals [07/19/23 1001]   Temperature Pulse Respirations Blood Pressure SpO2   98.2 °F (36.8 °C) 85 20 (!) 172/74 97 %      Temp Source Heart Rate Source Patient Position - Orthostatic VS BP Location FiO2 (%)   Oral Monitor Lying Left arm --      Pain Score       8             Orthostatic Vital Signs  Vitals:    07/19/23 1001 07/19/23 1100 07/19/23 1215   BP: (!) 172/74 138/67 (!) 184/86   Pulse: 85 73 86   Patient Position - Orthostatic VS: Lying         Physical Exam  Vitals and nursing note reviewed. Constitutional:       General: She is not in acute distress. Appearance: She is well-developed. She is not toxic-appearing or diaphoretic. Comments: Patient uncomfortable in bed, unable to get in a comfortable position   HENT:      Head: Normocephalic and atraumatic. Right Ear: External ear normal.      Left Ear: External ear normal.      Nose: Nose normal.      Mouth/Throat:      Mouth: Mucous membranes are moist.   Eyes:      Extraocular Movements: Extraocular movements intact.       Conjunctiva/sclera: Conjunctivae normal. Cardiovascular:      Rate and Rhythm: Normal rate and regular rhythm. Pulses: Normal pulses. Heart sounds: Normal heart sounds. No murmur heard. Pulmonary:      Effort: Pulmonary effort is normal. No respiratory distress. Breath sounds: Normal breath sounds. Abdominal:      General: Bowel sounds are normal.      Palpations: Abdomen is soft. Tenderness: There is abdominal tenderness in the right upper quadrant. There is left CVA tenderness. There is no right CVA tenderness. Positive signs include Sage's sign. Musculoskeletal:         General: No swelling. Cervical back: Neck supple. Skin:     General: Skin is warm and dry. Capillary Refill: Capillary refill takes less than 2 seconds. Neurological:      General: No focal deficit present. Mental Status: She is alert and oriented to person, place, and time.    Psychiatric:         Mood and Affect: Mood normal.         Behavior: Behavior normal.         ED Medications  Medications   fentanyl citrate (PF) 100 MCG/2ML 25 mcg (25 mcg Intravenous Given 7/19/23 1035)   ondansetron (ZOFRAN) injection 4 mg (4 mg Intravenous Given 7/19/23 1035)   ondansetron (ZOFRAN) injection 4 mg (4 mg Intravenous Given 7/19/23 1210)   HYDROmorphone (DILAUDID) injection 0.5 mg (0.5 mg Intravenous Given 7/19/23 1210)   iohexol (OMNIPAQUE) 350 MG/ML injection (SINGLE-DOSE) 100 mL (100 mL Intravenous Given 7/19/23 1204)       Diagnostic Studies  Results Reviewed     Procedure Component Value Units Date/Time    HS Troponin I 2hr [[id number]]  (Normal) Collected: 07/19/23 1213    Lab Status: Final result Specimen: Blood from Arm, Right Updated: 07/19/23 1254     hs TnI 2hr 4 ng/L      Delta 2hr hsTnI 0 ng/L     HS Troponin 0hr (reflex protocol) [[id number]]  (Normal) Collected: 07/19/23 1036    Lab Status: Final result Specimen: Blood from Arm, Right Updated: 07/19/23 1120     hs TnI 0hr 4 ng/L     Comprehensive metabolic panel [[id number]] (Abnormal) Collected: 07/19/23 1036    Lab Status: Final result Specimen: Blood from Arm, Right Updated: 07/19/23 1118     Sodium 138 mmol/L      Potassium 4.1 mmol/L      Chloride 112 mmol/L      CO2 25 mmol/L      ANION GAP 1 mmol/L      BUN 20 mg/dL      Creatinine 0.67 mg/dL      Glucose 105 mg/dL      Calcium 9.4 mg/dL      AST 27 U/L      ALT 26 U/L      Alkaline Phosphatase 67 U/L      Total Protein 7.6 g/dL      Albumin 3.6 g/dL      Total Bilirubin 0.70 mg/dL      eGFR 82 ml/min/1.73sq m     Narrative:      National Kidney Disease Foundation guidelines for Chronic Kidney Disease (CKD):   •  Stage 1 with normal or high GFR (GFR > 90 mL/min/1.73 square meters)  •  Stage 2 Mild CKD (GFR = 60-89 mL/min/1.73 square meters)  •  Stage 3A Moderate CKD (GFR = 45-59 mL/min/1.73 square meters)  •  Stage 3B Moderate CKD (GFR = 30-44 mL/min/1.73 square meters)  •  Stage 4 Severe CKD (GFR = 15-29 mL/min/1.73 square meters)  •  Stage 5 End Stage CKD (GFR <15 mL/min/1.73 square meters)  Note: GFR calculation is accurate only with a steady state creatinine    Lipase [[id number]]  (Normal) Collected: 07/19/23 1036    Lab Status: Final result Specimen: Blood from Arm, Right Updated: 07/19/23 1116     Lipase 186 u/L     CBC and differential [[id number]]  (Abnormal) Collected: 07/19/23 1036    Lab Status: Final result Specimen: Blood from Arm, Right Updated: 07/19/23 1055     WBC 7.06 Thousand/uL      RBC 4.32 Million/uL      Hemoglobin 12.8 g/dL      Hematocrit 38.8 %      MCV 90 fL      MCH 29.6 pg      MCHC 33.0 g/dL      RDW 13.7 %      MPV 9.9 fL      Platelets 888 Thousands/uL      nRBC 0 /100 WBCs      Neutrophils Relative 71 %      Immat GRANS % 1 %      Lymphocytes Relative 13 %      Monocytes Relative 10 %      Eosinophils Relative 4 %      Basophils Relative 1 %      Neutrophils Absolute 5.04 Thousands/µL      Immature Grans Absolute 0.04 Thousand/uL      Lymphocytes Absolute 0.94 Thousands/µL      Monocytes Absolute 0.69 Thousand/µL      Eosinophils Absolute 0.29 Thousand/µL      Basophils Absolute 0.06 Thousands/µL                  CTA dissection protocol chest/abdomen/pelvis   Final Result by Kyleigh Nielsen MD (07/19 1243)   1. No acute aortic abnormality. No evidence of aortic dissection or intramural hematoma      2. Mild intrahepatic ductal dilatation without definitive etiology. 3. 2 suspected duodenal diverticuli. No acute inflammatory changes. Workstation performed: [id number]         US right upper quadrant    (Results Pending)         Procedures  Procedures      ED Course  ED Course as of 07/23/23 1215   Wed Jul 19, 2023   1129 Lipase: 186   1131 hs TnI 0hr: 4   1131 CBC CMP within normal limits   1131 Patient reevaluated, patient reports nausea and pain. Patient still looks uncomfortable in bed. Will give more pain meds and antinausea   1133 Daughter is at bedside   1247 Sleeping in bed comfortably   1248 CTA dissection protocol negative for acute aortic pathology. Reveals mild intrahepatic ductal dilatation                Identification of Seniors at [facility] Most Recent Value   (ISAR) Identification of Seniors at Risk    Before the illness or injury that brought you to the Emergency, did you need someone to help you on a regular basis? 0 Filed at: 07/19/2023 1003   In the last 24 hours, have you needed more help than usual? 0 Filed at: 07/19/2023 1003   Have you been hospitalized for one or more nights during the past 6 months? 0 Filed at: 07/19/2023 1003   In general, do you see well? 0 Filed at: 07/19/2023 1003   In general, do you have serious problems with your memory? 0 Filed at: 07/19/2023 1003   Do you take more than three different medications every day? 1 Filed at: 07/19/2023 1003   ISAR Score 1 Filed at: 07/19/2023 1003                    SBIRT 20yo+    Flowsheet Row Most Recent Value   Initial Alcohol Screen: US AUDIT-C     1.  How often do you have a drink containing alcohol? 0 Filed at: 07/19/2023 1003   2. How many drinks containing alcohol do you have on a typical day you are drinking? 0 Filed at: 07/19/2023 1003   3b. FEMALE Any Age, or MALE 65+: How often do you have 4 or more drinks on one occassion? 0 Filed at: 07/19/2023 1003   Audit-C Score 0 Filed at: 07/19/2023 1003                Medical Decision Making  27-year-old female with past medical history of hypertension, anxiety, asthma brought to the ED by her neighbor for evaluation of acute onset of achy epigastric abdominal pain radiating to the mid back for the past 3 hours  DDx: Pancreatitis, cholecystitis, dissection, AAA  CBC, CMP, lipase, troponin, CTA dissection protocol ordered  Fentanyl and Zofran given for pain and nausea. Patient still uncomfortable in bed. Daughter is at bedside  0.5 Dilaudid given. Patient notes feeling much better   CTA dissection protocol negative for dissection. Patient discharged home with follow-up for general surgery. Ultrasound gallbladder scheduled. Biliary colic: acute illness or injury  Nausea: acute illness or injury  Amount and/or Complexity of Data Reviewed  External Data Reviewed: notes. Labs: ordered. Decision-making details documented in ED Course. Radiology: ordered. Risk  Prescription drug management. Disposition  Final diagnoses:   Biliary colic   Nausea     Time reflects when diagnosis was documented in both MDM as applicable and the Disposition within this note     Time User Action Codes Description Comment    7/19/2023  1:29 PM Luis Antonio Funez Add [Z66.02] Biliary colic     0/61/9842  6:41 PM Luis Antonio Funez Add [R11.0] Nausea     7/19/2023  1:34 PM Luis Antonio Funez Modify [K78.84] Biliary colic     7/69/0979  1:19 PM Luis Antonio Funez Modify [R11.0] Nausea       ED Disposition     ED Disposition   Discharge    Condition   Stable    Date/Time   Wed Jul 19, 2023  1:29 PM    [place] discharge to home/self care.                Follow-up Information     Follow up With Specialties Details Why [address] [facility], [address] [facility], Nurse Practitioner  As needed [address] [facility]. [address]  [phone]      Luis Ro, [address] [facility], Nurse Practitioner In 2 days  [address] [facility].   [place]  [phone]            Discharge Medication List as of 7/19/2023  1:34 PM      CONTINUE these medications which have NOT CHANGED    Details   albuterol (Ventolin HFA) 90 mcg/act inhaler Inhale 2 puffs every 6 (six) hours as needed for wheezing, Starting Tue 7/5/2022, Normal      ALBUTEROL IN Inhale, Historical Med      ALPRAZolam (XANAX) 0.25 mg tablet Take 1 tablet (0.25 mg total) by mouth daily as needed for anxiety, Starting Fri 11/5/2021, Normal      amLODIPine-benazepril (LOTREL 5-10) 5-10 MG per capsule TAKE ONE CAPSULE BY MOUTH EVERY DAY, Normal      ascorbic acid (VITAMIN C) 1000 MG tablet Historical Med      buPROPion (WELLBUTRIN SR) 150 mg 12 hr tablet Take 1 tablet (150 mg total) by mouth 2 (two) times a day, Starting Tue 3/21/2023, Normal      cetirizine (ZyrTEC) 10 mg tablet Take 10 mg by mouth daily, Historical Med      Chlorpheniramine-DM (CORICIDIN COUGH/COLD) 4-30 MG TABS Take 1 tablet by mouth daily at bedtime as needed (cough), Starting Tue 7/5/2022, No Print      Cholecalciferol (VITAMIN D3) 1000 units CAPS Daily, Historical Med      conjugated estrogens (PREMARIN) vaginal cream Insert 0.5 g into the vagina once a week, Starting Mon 3/1/2021, Normal      EPINEPHrine (EPIPEN) 0.3 mg/0.3 mL SOAJ Inject 0.3 mL (0.3 mg total) into a muscle once for 1 dose, Starting Tue 11/17/2020, No Print      ipratropium (ATROVENT) 0.03 % nasal spray 2 sprays into each nostril 2 (two) times a day, Starting Wed 1/18/2023, Until Thu 1/18/2024, Normal      mometasone (NASONEX) 50 mcg/act nasal spray 2 sprays into each nostril daily, Starting Wed 1/18/2023, Until Thu 1/18/2024, Normal      Mometasone Furoate (Asmanex HFA) 200 MCG/ACT AERO Inhale 2 puffs (400 mcg total) 2 (two) times a day, Starting Tue 7/5/2022, Until Wed 7/5/2023, Normal      mupirocin (BACTROBAN) 2 % ointment Apply topically 2 (two) times a day for 7 days X 7 days to open area, Starting Wed 1/18/2023, Until Wed 1/25/2023, Normal      Probiotic Product (Acidophilus) CHEW Historical Med      zafirlukast (ACCOLATE) 20 MG tablet Take 1 tablet (20 mg total) by mouth 2 (two) times a day, Starting Tue 7/5/2022, Until Wed 7/5/2023, Normal               PDMP Review       Value Time User    PDMP Reviewed  Yes 11/5/2021  4:48 PM Hector Hamilton MD           ED Provider  Attending physically available and evaluated Jarrett Ayala. I managed the patient along with the ED Attending.     Electronically Signed by         Hollie Strickland DO  07/23/23 1586

## 2023-07-19 NOTE — DISCHARGE INSTRUCTIONS
You were evaluated in the Emergency Department today for abdominal pain. Your evaluation did not show evidence of medical conditions requiring emergent intervention at this time, and we feel safe discharging you. Please schedule an appointment with general surgery to follow-up with your biliary colic pain. Return to the Emergency Department if you experience worsening or uncontrolled pain, fevers 100.4°F or greater, vomiting, inability to tolerate food or fluids by mouth, bloody stools or vomit, black or tarry stools, or any other concerning symptoms. Thank you for choosing us for your care.

## 2023-07-20 ENCOUNTER — TELEPHONE (OUTPATIENT)
Dept: INTERNAL MEDICINE CLINIC | Facility: CLINIC | Age: 83
End: 2023-07-20

## 2023-07-20 DIAGNOSIS — K80.50 BILIARY COLIC: Primary | ICD-10-CM

## 2023-07-20 NOTE — TELEPHONE ENCOUNTER
Patient would like the referral to GI, number given for [facility]. Number given for central scheduling.

## 2023-07-20 NOTE — TELEPHONE ENCOUNTER
Pt. was in the ER yesterday. The doctor called her today and told her she should have a gallbladder U/S done. Pt. wants to know what Vanita Stratton thinks and if she should f/u with a Gastro Doc.

## 2023-07-20 NOTE — TELEPHONE ENCOUNTER
I reviewed her ER visit. Is she feeling better? Please schedule ultrasound which was already ordered.  And yes would recommend seeing GI- does she have a GI or needs a referral? If so please give her the information for [facility] and I will place referral

## 2023-07-23 ENCOUNTER — HOSPITAL ENCOUNTER (OUTPATIENT)
Dept: RADIOLOGY | Facility: HOSPITAL | Age: 83
Discharge: HOME | End: 2023-07-23
Payer: COMMERCIAL

## 2023-07-23 DIAGNOSIS — K80.50 BILIARY COLIC: ICD-10-CM

## 2023-07-23 PROCEDURE — 76705 ECHO EXAM OF ABDOMEN: CPT

## 2023-07-26 ENCOUNTER — CONSULT (OUTPATIENT)
Dept: GASTROENTEROLOGY | Facility: CLINIC | Age: 83
End: 2023-07-26
Payer: COMMERCIAL

## 2023-07-26 VITALS
BODY MASS INDEX: 31.89 KG/M2 | TEMPERATURE: 96.4 F | HEART RATE: 77 BPM | SYSTOLIC BLOOD PRESSURE: 140 MMHG | DIASTOLIC BLOOD PRESSURE: 88 MMHG | HEIGHT: 63 IN

## 2023-07-26 DIAGNOSIS — K80.50 BILIARY COLIC: ICD-10-CM

## 2023-07-26 PROCEDURE — 99203 OFFICE O/P NEW LOW 30 MIN: CPT | Performed by: INTERNAL MEDICINE

## 2023-07-26 RX ORDER — OMEPRAZOLE 40 MG/1
40 CAPSULE, DELAYED RELEASE ORAL DAILY
Qty: 30 CAPSULE | Refills: 0 | Status: SHIPPED | OUTPATIENT
Start: 2023-07-26

## 2023-07-26 NOTE — PROGRESS NOTES
[facility] - Outpatient Consultation  Alberto Durham 81 y.o. female MRN: [id number]  Encounter: [id number]        ASSESSMENT AND PLAN   Patient is an 81year old female with a past medical history of HTN, anxiety, obesity presenting with intermittent abdominal pain in epigastrium and RUQ    1. Biliary colic  · Gallstones seen on CT scan   · RUQ ordered but results pending  · Some intrahepatic biliary dilatation seen on CT but no CBD dilation or choledocholithiasis seen  · Will await USG results to rule out choledocholithiasis, LFT's are normal  · Will refer to general surgery for cholecystectomy  · Will do a trial of PPI as well  - Ambulatory Referral to Gastroenterology  - Ambulatory Referral to General Surgery; Future  - omeprazole (PriLOSEC) 40 MG capsule; Take 1 capsule (40 mg total) by mouth daily  Dispense: 30 capsule; Refill: 0    Orders Placed This Encounter   Procedures   • Ambulatory Referral to General Surgery         HISTORY OF PRESENT ILLNESS     Patient is an 78-year-old female with a past medical history of hypertension, obesity who presents for follow-up after being referred from the emergency room. On 19 July patient experienced severe epigastric and right upper quadrant abdominal pain radiating to her back and went to the ER for evaluation. She was dealing with mild right upper quadrant pain associated with fatty foods even prior to that. Work-up in the ED was negative for pancreatitis and CT scan showed gallstones. Patient was discharged from the ED and a right upper quadrant ultrasound was ordered for her. After discharge patient has cut down fatty foods from her diet and reports that her symptoms are better. She still has some nausea after she eats but denies any epigastric or right upper quadrant pain currently. She denies any change in her bowel habits, melena, heartburn, dysphagia, unintentional weight loss.           REVIEW OF SYSTEMS     CONSTITUTIONAL: Denies any fever, chills, rigors, and weight loss. HEENT: No earache or tinnitus. Denies hearing loss or visual disturbances. CARDIOVASCULAR: No chest pain or palpitations. RESPIRATORY: Denies any cough, hemoptysis, shortness of breath or dyspnea on exertion. GASTROINTESTINAL: As noted in the History of Present Illness. GENITOURINARY: No problems with urination. Denies any hematuria or dysuria. NEUROLOGIC: No dizziness or vertigo, denies headaches. MUSCULOSKELETAL: Denies any muscle or joint pain. SKIN: Denies skin rashes or itching. ENDOCRINE: Denies excessive thirst. Denies intolerance to heat or cold. PSYCHOSOCIAL: Denies depression or anxiety. Denies any recent memory loss.        Historical information     Past Medical History:   Diagnosis Date   • Anterior tibialis tendonitis of left leg    • Cervical cancer ([address])    • COVID 10/25/2021   • COVID-19 virus infection 10/20/2021   • Shingles    • Stress fracture of right tibia 04/2022    tibial plateau     Past Surgical History:   Procedure Laterality Date   • BLADDER SURGERY     • HALLUX VALGUS CORRECTION Right    • TOTAL ABDOMINAL HYSTERECTOMY  1987    Cervical CA     Social History   Social History     Substance and Sexual Activity   Alcohol Use Yes   • Alcohol/week: 1.0 standard drink of alcohol   • Types: 1 Glasses of wine per week    Comment: socially     Social History     Substance and Sexual Activity   Drug Use Never     Social History     Tobacco Use   Smoking Status Never   Smokeless Tobacco Never     Family History   Problem Relation Age of Onset   • No Known Problems Mother    • Heart disease Father    • Heart disease Brother    • Diabetes Brother    • No Known Problems Son    • No Known Problems Daughter    • No Known Problems Daughter    • Alcohol abuse Neg Hx    • Substance Abuse Neg Hx    • Mental illness Neg Hx    • Depression Neg Hx        Allergies       Current Outpatient Medications:   •  albuterol (Ventolin HFA) 90 mcg/act inhaler  •  ALBUTEROL IN  •  ALPRAZolam (XANAX) 0.25 mg tablet  •  amLODIPine-benazepril (LOTREL 5-10) 5-10 MG per capsule  •  ascorbic acid (VITAMIN C) 1000 MG tablet  •  buPROPion (WELLBUTRIN SR) 150 mg 12 hr tablet  •  cetirizine (ZyrTEC) 10 mg tablet  •  Chlorpheniramine-DM (CORICIDIN COUGH/COLD) 4-30 MG TABS  •  Cholecalciferol (VITAMIN D3) 1000 units CAPS  •  conjugated estrogens (PREMARIN) vaginal cream  •  ipratropium (ATROVENT) 0.03 % nasal spray  •  mometasone (NASONEX) 50 mcg/act nasal spray  •  omeprazole (PriLOSEC) 40 MG capsule  •  Probiotic Product (Acidophilus) CHEW  •  EPINEPHrine (EPIPEN) 0.3 mg/0.3 mL SOAJ  •  Mometasone Furoate (Asmanex HFA) 200 MCG/ACT AERO  •  mupirocin (BACTROBAN) 2 % ointment  •  ondansetron (ZOFRAN) 4 mg tablet  •  zafirlukast (ACCOLATE) 20 MG tablet    Allergies   Allergen Reactions   • Codeine Other (See Comments) and Tachycardia   • Iodine - Food Allergy Other (See Comments)   • Shellfish-Derived Products - Food Allergy            Objective assessment       Blood pressure 140/88, pulse 77, temperature (!) 96.4 °F (35.8 °C), temperature source Tympanic, height 5' 3" (1.6 m), not currently breastfeeding. Body mass index is 31.89 kg/m². PHYSICAL EXAM:         Physical Exam:   GENERAL: NAD  HEENT:  NC/AT, MMM, no scleral icterus  CARDIAC:  Regular rate and rhythm  PULMONARY:  No respiratory distress, no wheezing/rales/rhonci, non-labored breathing  ABDOMEN: Right upper quadrant tenderness, +BS, no rebound/guarding/rigidity  Extremities:  No edema, cyanosis, or clubbing  NEUROLOGIC:  Alert/oriented x3. SKIN:  No rashes or erythema      Lab Results:      No visits with results within 1 Day(s) from this visit.    Latest known visit with results is:   Admission on 07/19/2023, Discharged on 07/19/2023   Component Date Value   • Ventricular Rate 07/19/2023 81    • Atrial Rate 07/19/2023 81    • MS Interval 07/19/2023 222    • QRSD Interval 07/19/2023 102    • QT Interval 07/19/2023 406    • QTC Interval 07/19/2023 471    • P Axis 07/19/2023 57    • QRS Axis 07/19/2023 -25    • T Wave Axis 07/19/2023 109    • hs TnI 0hr 07/19/2023 4    • WBC 07/19/2023 7.06    • RBC 07/19/2023 4.32    • Hemoglobin 07/19/2023 12.8    • Hematocrit 07/19/2023 38.8    • MCV 07/19/2023 90    • MCH 07/19/2023 29.6    • MCHC 07/19/2023 33.0    • RDW 07/19/2023 13.7    • MPV 07/19/2023 9.9    • Platelets 97/06/4677 271    • nRBC 07/19/2023 0    • Neutrophils Relative 07/19/2023 71    • Immat GRANS % 07/19/2023 1    • Lymphocytes Relative 07/19/2023 13 (L)    • Monocytes Relative 07/19/2023 10    • Eosinophils Relative 07/19/2023 4    • Basophils Relative 07/19/2023 1    • Neutrophils Absolute 07/19/2023 5.04    • Immature Grans Absolute 07/19/2023 0.04    • Lymphocytes Absolute 07/19/2023 0.94    • Monocytes Absolute 07/19/2023 0.69    • Eosinophils Absolute 07/19/2023 0.29    • Basophils Absolute 07/19/2023 0.06    • Sodium 07/19/2023 138    • Potassium 07/19/2023 4.1    • Chloride 07/19/2023 112 (H)    • CO2 07/19/2023 25    • ANION GAP 07/19/2023 1    • BUN 07/19/2023 20    • Creatinine 07/19/2023 0.67    • Glucose 07/19/2023 105    • Calcium 07/19/2023 9.4    • AST 07/19/2023 27    • ALT 07/19/2023 26    • Alkaline Phosphatase 07/19/2023 67    • Total Protein 07/19/2023 7.6    • Albumin 07/19/2023 3.6    • Total Bilirubin 07/19/2023 0.70    • eGFR 07/19/2023 82    • Lipase 07/19/2023 186    • hs TnI 2hr 07/19/2023 4    • Delta 2hr hsTnI 07/19/2023 0          Radiology Results:      CTA dissection protocol chest/abdomen/pelvis    Result Date: 7/19/2023  Narrative: CTA - CHEST, ABDOMEN AND PELVIS - WITHOUT AND WITH IV CONTRAST INDICATION:   epigastric pain raidiating to the back. COMPARISON:  None.  TECHNIQUE: CT examination of the chest, abdomen and pelvis was performed both prior to and after the administration of intravenous contrast.   The noncontrast portion of this examination was performed utilizing low radiation dose technique. Thin section angiographic arterial phase post contrast technique was used in order to evaluate for aortic dissection. 3D reformatted images and volume rendering were performed on an independent workstation. Multiplanar 2D reformatted images were created from the source data. Radiation dose length product (DLP) for this visit:  2479.53 mGy-cm . This examination, like all CT scans performed in the [facility], was performed utilizing techniques to minimize radiation dose exposure, including the use of iterative reconstruction and automated exposure control. IV Contrast:  100 mL of iohexol (OMNIPAQUE) Enteric Contrast:  Enteric contrast was not administered. FINDINGS: AORTA:  There is no aortic dissection or intramural hematoma. There is no aortic aneurysm. No significant atherosclerotic disease. Specifically, no flow limiting atherosclerotic stenosis of aorta or major aortic branch vessel in the chest, abdomen or pelvis. CHEST LUNGS:  Lungs are clear. There is no tracheal or endobronchial lesion. PLEURA:  Unremarkable. HEART/PULMONARY ARTERIAL TREE:  Unremarkable for patient's age. MEDIASTINUM AND MJ:  Unremarkable. CHEST WALL AND LOWER NECK:  Unremarkable. ABDOMEN LIVER/BILIARY TREE: Mild intrahepatic ductal dilatation. Common duct is nondilated measuring 6 mm. No visualized stones. GALLBLADDER:  There are gallstone(s) within the gallbladder, without pericholecystic inflammatory changes. SPLEEN:  Unremarkable. PANCREAS:  Unremarkable. ADRENAL GLANDS:  Unremarkable. KIDNEYS/URETERS:  Unremarkable. No hydronephrosis. STOMACH AND BOWEL: There are 2 separate air-fluid collection surrounding the second and third portion of the duodenum which are suspected duodenal diverticuli. Anterior collection measures 2 x 3 cm and posterior collection measures approximately 5 x 1 cm. No acute inflammatory changes. Extensive colonic diverticuli without evidence of diverticulitis. APPENDIX:  No findings to suggest appendicitis. ABDOMINOPELVIC CAVITY:  No ascites or free intraperitoneal air. No lymphadenopathy. PELVIS REPRODUCTIVE ORGANS:  Unremarkable for patient's age. URINARY BLADDER:  Unremarkable. ABDOMINAL WALL/INGUINAL REGIONS:  Unremarkable. OSSEOUS STRUCTURES:  No acute fracture or destructive osseous lesion. Impression: 1. No acute aortic abnormality. No evidence of aortic dissection or intramural hematoma 2. Mild intrahepatic ductal dilatation without definitive etiology. 3. 2 suspected duodenal diverticuli. No acute inflammatory changes.  Workstation performed: Angelita Armenta MD  Gastroenterology Fellow

## 2023-08-25 ENCOUNTER — CONSULT (OUTPATIENT)
Dept: SURGERY | Facility: CLINIC | Age: 83
End: 2023-08-25
Payer: COMMERCIAL

## 2023-08-25 VITALS
SYSTOLIC BLOOD PRESSURE: 134 MMHG | HEART RATE: 79 BPM | WEIGHT: 174.38 LBS | HEIGHT: 61 IN | TEMPERATURE: 98 F | DIASTOLIC BLOOD PRESSURE: 77 MMHG | BODY MASS INDEX: 32.92 KG/M2

## 2023-08-25 DIAGNOSIS — K80.50 BILIARY COLIC: ICD-10-CM

## 2023-08-25 PROCEDURE — 99204 OFFICE O/P NEW MOD 45 MIN: CPT | Performed by: SURGERY

## 2023-08-25 NOTE — ASSESSMENT & PLAN NOTE
42-year-old female with a recent episode of biliary colic. Plan:  - Will plan on laparoscopic cholecystectomy, possible open, possible intraoperative cholangiogram. Risks and benefits of surgery were reviewed and the patient was amenable. Specific risks discussed include biliary injury or leak. - Will schedule at the patient's earliest convenience. - Patient was counseled of the signs and symptoms of gallbladder attacks, and was instructed to please call if these should arise prior to surgery.

## 2023-08-25 NOTE — PROGRESS NOTES
Assessment/Plan:    Biliary colic  81-JUWAN-AXB female with a recent episode of biliary colic. Plan:  - Will plan on laparoscopic cholecystectomy, possible open, possible intraoperative cholangiogram. Risks and benefits of surgery were reviewed and the patient was amenable. Specific risks discussed include biliary injury or leak. - Will schedule at the patient's earliest convenience. - Patient was counseled of the signs and symptoms of gallbladder attacks, and was instructed to please call if these should arise prior to surgery. Diagnoses and all orders for this visit:    Biliary colic  -     Ambulatory Referral to General Surgery          Subjective:      Patient ID: Asher Craig is a 81 y.o. female. 80 y/o female presents to the office for consult on biliary colic with hx of gallstones. Pt presented to the ER on 7/19 with sharp epigastric pain radiating to the back for three hours, and physical exam localized the pain to the RUQ with a positive Sage's Sign. Pt has changed diet since that time, but was eating a high fat diet and had intermittent epigastric pain. On presentation today, pt is asymptomatic. Pt does note that she has nausea if she over eats. Pt denies postprandial pain currently. Pt denies nausea, vomiting, diarrhea. Reviewed 07/19 CT from ED with patient, as well as findings from 07/23 RUQ US. Placed in PACS. The following portions of the patient's history were reviewed and updated as appropriate:   She  has a past medical history of Anterior tibialis tendonitis of left leg, Cervical cancer ([address]), COVID (10/25/2021), COVID-19 virus infection (10/20/2021), Shingles, and Stress fracture of right tibia (04/2022).   She   Patient Active Problem List    Diagnosis Date Noted   • Biliary colic 00/51/2778   • Arthritis of right knee 04/14/2023   • Anterior tibialis tendonitis of left leg    • Mild persistent asthma without complication 07/74/7794   • Allergic rhinitis due to animal (cat) (dog) hair and dander 12/16/2019   • Intrinsic atopic dermatitis 12/16/2019   • Shellfish allergy 12/16/2019   • Laryngopharyngeal reflux 12/16/2019   • Obesity with body mass index 30 or greater 09/25/2019   • Anxiety 03/05/2019   • Chronic seasonal allergic rhinitis due to pollen 07/06/2018   • Anxiety and depression 07/20/2012   • Benign essential hypertension 06/25/2012   • Dyslipidemia 06/25/2012   • History of cervical cancer 08/25/2011     She  has a past surgical history that includes Hallux valgus correction (Right); Total abdominal hysterectomy (1987); and Bladder surgery. Her family history includes Diabetes in her brother; Heart disease in her brother and father; No Known Problems in her daughter, daughter, mother, and son. She  reports that she has never smoked. She has never used smokeless tobacco. She reports current alcohol use of about 1.0 standard drink of alcohol per week. She reports that she does not use drugs.   Current Outpatient Medications   Medication Sig Dispense Refill   • albuterol (Ventolin HFA) 90 mcg/act inhaler Inhale 2 puffs every 6 (six) hours as needed for wheezing 18 g 3   • ALBUTEROL IN Inhale     • ALPRAZolam (XANAX) 0.25 mg tablet Take 1 tablet (0.25 mg total) by mouth daily as needed for anxiety 10 tablet 0   • amLODIPine-benazepril (LOTREL 5-10) 5-10 MG per capsule TAKE ONE CAPSULE BY MOUTH EVERY DAY 90 capsule 0   • ascorbic acid (VITAMIN C) 1000 MG tablet      • buPROPion (WELLBUTRIN SR) 150 mg 12 hr tablet Take 1 tablet (150 mg total) by mouth 2 (two) times a day 180 tablet 0   • cetirizine (ZyrTEC) 10 mg tablet Take 10 mg by mouth daily     • Chlorpheniramine-DM (CORICIDIN COUGH/COLD) 4-30 MG TABS Take 1 tablet by mouth daily at bedtime as needed (cough)     • Cholecalciferol (VITAMIN D3) 1000 units CAPS Daily     • conjugated estrogens (PREMARIN) vaginal cream Insert 0.5 g into the vagina once a week 42.5 g 0   • EPINEPHrine (EPIPEN) 0.3 mg/0.3 mL SOAJ Inject 0.3 mL (0.3 mg total) into a muscle once for 1 dose 0.6 mL 0   • ipratropium (ATROVENT) 0.03 % nasal spray 2 sprays into each nostril 2 (two) times a day 360 mL 3   • mometasone (NASONEX) 50 mcg/act nasal spray 2 sprays into each nostril daily 17 g 11   • Mometasone Furoate (Asmanex HFA) 200 MCG/ACT AERO Inhale 2 puffs (400 mcg total) 2 (two) times a day 13 g 11   • mupirocin (BACTROBAN) 2 % ointment Apply topically 2 (two) times a day for 7 days X 7 days to open area 30 g 3   • omeprazole (PriLOSEC) 40 MG capsule Take 1 capsule (40 mg total) by mouth daily 30 capsule 0   • ondansetron (ZOFRAN) 4 mg tablet Take 1 tablet (4 mg total) by mouth every 8 (eight) hours as needed for nausea or vomiting for up to 4 days 12 tablet 0   • Probiotic Product (Acidophilus) CHEW      • zafirlukast (ACCOLATE) 20 MG tablet Take 1 tablet (20 mg total) by mouth 2 (two) times a day 180 tablet 3     No current facility-administered medications for this visit.      Current Outpatient Medications on File Prior to Visit   Medication Sig   • albuterol (Ventolin HFA) 90 mcg/act inhaler Inhale 2 puffs every 6 (six) hours as needed for wheezing   • ALBUTEROL IN Inhale   • ALPRAZolam (XANAX) 0.25 mg tablet Take 1 tablet (0.25 mg total) by mouth daily as needed for anxiety   • amLODIPine-benazepril (LOTREL 5-10) 5-10 MG per capsule TAKE ONE CAPSULE BY MOUTH EVERY DAY   • ascorbic acid (VITAMIN C) 1000 MG tablet    • buPROPion (WELLBUTRIN SR) 150 mg 12 hr tablet Take 1 tablet (150 mg total) by mouth 2 (two) times a day   • cetirizine (ZyrTEC) 10 mg tablet Take 10 mg by mouth daily   • Chlorpheniramine-DM (CORICIDIN COUGH/COLD) 4-30 MG TABS Take 1 tablet by mouth daily at bedtime as needed (cough)   • Cholecalciferol (VITAMIN D3) 1000 units CAPS Daily   • conjugated estrogens (PREMARIN) vaginal cream Insert 0.5 g into the vagina once a week   • EPINEPHrine (EPIPEN) 0.3 mg/0.3 mL SOAJ Inject 0.3 mL (0.3 mg total) into a muscle once for 1 dose   • ipratropium (ATROVENT) 0.03 % nasal spray 2 sprays into each nostril 2 (two) times a day   • mometasone (NASONEX) 50 mcg/act nasal spray 2 sprays into each nostril daily   • Mometasone Furoate (Asmanex HFA) 200 MCG/ACT AERO Inhale 2 puffs (400 mcg total) 2 (two) times a day   • mupirocin (BACTROBAN) 2 % ointment Apply topically 2 (two) times a day for 7 days X 7 days to open area   • omeprazole (PriLOSEC) 40 MG capsule Take 1 capsule (40 mg total) by mouth daily   • ondansetron (ZOFRAN) 4 mg tablet Take 1 tablet (4 mg total) by mouth every 8 (eight) hours as needed for nausea or vomiting for up to 4 days   • Probiotic Product (Acidophilus) CHEW    • zafirlukast (ACCOLATE) 20 MG tablet Take 1 tablet (20 mg total) by mouth 2 (two) times a day     No current facility-administered medications on file prior to visit. She is allergic to codeine, iodine - food allergy, and shellfish-derived products - food allergy. .    Review of Systems   Constitutional: Negative for chills, fatigue and fever. HENT: Negative for ear pain, facial swelling, sinus pressure, sinus pain and sore throat. Eyes: Negative for pain and visual disturbance. Respiratory: Negative for cough, shortness of breath and wheezing. Cardiovascular: Negative for chest pain and palpitations. Gastrointestinal: Negative for abdominal pain, constipation, diarrhea, nausea and vomiting. Endocrine: Negative for cold intolerance and heat intolerance. Genitourinary: Negative for dysuria, flank pain and hematuria. Musculoskeletal: Negative for arthralgias, back pain and neck pain. Skin: Negative for color change, rash and wound. Neurological: Negative for seizures, syncope, facial asymmetry, light-headedness and numbness. Psychiatric/Behavioral: Negative for behavioral problems, confusion and suicidal ideas. All other systems reviewed and are negative.         Objective:      /77 (BP Location: Left arm, Patient Position: Sitting, Cuff Size: Large)   Pulse 79   Temp 98 °F (36.7 °C) (Skin)   Ht 5' 1" (1.549 m)   Wt 79.1 kg (174 lb 6 oz)   BMI 32.95 kg/m²          Physical Exam  Constitutional:       Appearance: Normal appearance. She is obese. HENT:      Head: Normocephalic. Cardiovascular:      Rate and Rhythm: Normal rate and regular rhythm. Pulses: Normal pulses. Heart sounds: Normal heart sounds. Pulmonary:      Effort: Pulmonary effort is normal.      Breath sounds: Normal breath sounds. Abdominal:      General: Abdomen is flat. Bowel sounds are normal. There is no distension. Palpations: Abdomen is soft. There is no mass. Tenderness: There is no abdominal tenderness. There is no guarding or rebound. Negative signs include Sage's sign. Hernia: No hernia is present. Musculoskeletal:      Cervical back: Normal range of motion. Skin:     General: Skin is warm and dry. Neurological:      General: No focal deficit present. Mental Status: She is alert. Mental status is at baseline. Psychiatric:         Mood and Affect: Mood normal.         Behavior: Behavior normal.         Thought Content:  Thought content normal.         Judgment: Judgment normal.

## 2023-08-25 NOTE — H&P (VIEW-ONLY)
Assessment/Plan:    Biliary colic  [id number] female with a recent episode of biliary colic. Plan:  - Will plan on laparoscopic cholecystectomy, possible open, possible intraoperative cholangiogram. Risks and benefits of surgery were reviewed and the patient was amenable. Specific risks discussed include biliary injury or leak. - Will schedule at the patient's earliest convenience. - Patient was counseled of the signs and symptoms of gallbladder attacks, and was instructed to please call if these should arise prior to surgery. Diagnoses and all orders for this visit:    Biliary colic  -     Ambulatory Referral to General Surgery          Subjective:      Patient ID: Asher Craig is a 81 y.o. female. 82 y/o female presents to the office for consult on biliary colic with hx of gallstones. Pt presented to the ER on 7/19 with sharp epigastric pain radiating to the back for three hours, and physical exam localized the pain to the RUQ with a positive Sage's Sign. Pt has changed diet since that time, but was eating a high fat diet and had intermittent epigastric pain. On presentation today, pt is asymptomatic. Pt does note that she has nausea if she over eats. Pt denies postprandial pain currently. Pt denies nausea, vomiting, diarrhea. Reviewed 07/19 CT from ED with patient, as well as findings from 07/23 RUQ US. Placed in PACS. The following portions of the patient's history were reviewed and updated as appropriate:   She  has a past medical history of Anterior tibialis tendonitis of left leg, Cervical cancer ([address]), COVID (10/25/2021), COVID-19 virus infection (10/20/2021), Shingles, and Stress fracture of right tibia (04/2022).   She   Patient Active Problem List    Diagnosis Date Noted   • Biliary colic 43/02/5987   • Arthritis of right knee 04/14/2023   • Anterior tibialis tendonitis of left leg    • Mild persistent asthma without complication 85/16/3213   • Allergic rhinitis due to animal (cat) (dog) hair and dander 12/16/2019   • Intrinsic atopic dermatitis 12/16/2019   • Shellfish allergy 12/16/2019   • Laryngopharyngeal reflux 12/16/2019   • Obesity with body mass index 30 or greater 09/25/2019   • Anxiety 03/05/2019   • Chronic seasonal allergic rhinitis due to pollen 07/06/2018   • Anxiety and depression 07/20/2012   • Benign essential hypertension 06/25/2012   • Dyslipidemia 06/25/2012   • History of cervical cancer 08/25/2011     She  has a past surgical history that includes Hallux valgus correction (Right); Total abdominal hysterectomy (1987); and Bladder surgery. Her family history includes Diabetes in her brother; Heart disease in her brother and father; No Known Problems in her daughter, daughter, mother, and son. She  reports that she has never smoked. She has never used smokeless tobacco. She reports current alcohol use of about 1.0 standard drink of alcohol per week. She reports that she does not use drugs.   Current Outpatient Medications   Medication Sig Dispense Refill   • albuterol (Ventolin HFA) 90 mcg/act inhaler Inhale 2 puffs every 6 (six) hours as needed for wheezing 18 g 3   • ALBUTEROL IN Inhale     • ALPRAZolam (XANAX) 0.25 mg tablet Take 1 tablet (0.25 mg total) by mouth daily as needed for anxiety 10 tablet 0   • amLODIPine-benazepril (LOTREL 5-10) 5-10 MG per capsule TAKE ONE CAPSULE BY MOUTH EVERY DAY 90 capsule 0   • ascorbic acid (VITAMIN C) 1000 MG tablet      • buPROPion (WELLBUTRIN SR) 150 mg 12 hr tablet Take 1 tablet (150 mg total) by mouth 2 (two) times a day 180 tablet 0   • cetirizine (ZyrTEC) 10 mg tablet Take 10 mg by mouth daily     • Chlorpheniramine-DM (CORICIDIN COUGH/COLD) 4-30 MG TABS Take 1 tablet by mouth daily at bedtime as needed (cough)     • Cholecalciferol (VITAMIN D3) 1000 units CAPS Daily     • conjugated estrogens (PREMARIN) vaginal cream Insert 0.5 g into the vagina once a week 42.5 g 0   • EPINEPHrine (EPIPEN) 0.3 mg/0.3 mL SOAJ Inject 0.3 mL (0.3 mg total) into a muscle once for 1 dose 0.6 mL 0   • ipratropium (ATROVENT) 0.03 % nasal spray 2 sprays into each nostril 2 (two) times a day 360 mL 3   • mometasone (NASONEX) 50 mcg/act nasal spray 2 sprays into each nostril daily 17 g 11   • Mometasone Furoate (Asmanex HFA) 200 MCG/ACT AERO Inhale 2 puffs (400 mcg total) 2 (two) times a day 13 g 11   • mupirocin (BACTROBAN) 2 % ointment Apply topically 2 (two) times a day for 7 days X 7 days to open area 30 g 3   • omeprazole (PriLOSEC) 40 MG capsule Take 1 capsule (40 mg total) by mouth daily 30 capsule 0   • ondansetron (ZOFRAN) 4 mg tablet Take 1 tablet (4 mg total) by mouth every 8 (eight) hours as needed for nausea or vomiting for up to 4 days 12 tablet 0   • Probiotic Product (Acidophilus) CHEW      • zafirlukast (ACCOLATE) 20 MG tablet Take 1 tablet (20 mg total) by mouth 2 (two) times a day 180 tablet 3     No current facility-administered medications for this visit.      Current Outpatient Medications on File Prior to Visit   Medication Sig   • albuterol (Ventolin HFA) 90 mcg/act inhaler Inhale 2 puffs every 6 (six) hours as needed for wheezing   • ALBUTEROL IN Inhale   • ALPRAZolam (XANAX) 0.25 mg tablet Take 1 tablet (0.25 mg total) by mouth daily as needed for anxiety   • amLODIPine-benazepril (LOTREL 5-10) 5-10 MG per capsule TAKE ONE CAPSULE BY MOUTH EVERY DAY   • ascorbic acid (VITAMIN C) 1000 MG tablet    • buPROPion (WELLBUTRIN SR) 150 mg 12 hr tablet Take 1 tablet (150 mg total) by mouth 2 (two) times a day   • cetirizine (ZyrTEC) 10 mg tablet Take 10 mg by mouth daily   • Chlorpheniramine-DM (CORICIDIN COUGH/COLD) 4-30 MG TABS Take 1 tablet by mouth daily at bedtime as needed (cough)   • Cholecalciferol (VITAMIN D3) 1000 units CAPS Daily   • conjugated estrogens (PREMARIN) vaginal cream Insert 0.5 g into the vagina once a week   • EPINEPHrine (EPIPEN) 0.3 mg/0.3 mL SOAJ Inject 0.3 mL (0.3 mg total) into a muscle once for 1 dose   • ipratropium (ATROVENT) 0.03 % nasal spray 2 sprays into each nostril 2 (two) times a day   • mometasone (NASONEX) 50 mcg/act nasal spray 2 sprays into each nostril daily   • Mometasone Furoate (Asmanex HFA) 200 MCG/ACT AERO Inhale 2 puffs (400 mcg total) 2 (two) times a day   • mupirocin (BACTROBAN) 2 % ointment Apply topically 2 (two) times a day for 7 days X 7 days to open area   • omeprazole (PriLOSEC) 40 MG capsule Take 1 capsule (40 mg total) by mouth daily   • ondansetron (ZOFRAN) 4 mg tablet Take 1 tablet (4 mg total) by mouth every 8 (eight) hours as needed for nausea or vomiting for up to 4 days   • Probiotic Product (Acidophilus) CHEW    • zafirlukast (ACCOLATE) 20 MG tablet Take 1 tablet (20 mg total) by mouth 2 (two) times a day     No current facility-administered medications on file prior to visit. She is allergic to codeine, iodine - food allergy, and shellfish-derived products - food allergy. .    Review of Systems   Constitutional: Negative for chills, fatigue and fever. HENT: Negative for ear pain, facial swelling, sinus pressure, sinus pain and sore throat. Eyes: Negative for pain and visual disturbance. Respiratory: Negative for cough, shortness of breath and wheezing. Cardiovascular: Negative for chest pain and palpitations. Gastrointestinal: Negative for abdominal pain, constipation, diarrhea, nausea and vomiting. Endocrine: Negative for cold intolerance and heat intolerance. Genitourinary: Negative for dysuria, flank pain and hematuria. Musculoskeletal: Negative for arthralgias, back pain and neck pain. Skin: Negative for color change, rash and wound. Neurological: Negative for seizures, syncope, facial asymmetry, light-headedness and numbness. Psychiatric/Behavioral: Negative for behavioral problems, confusion and suicidal ideas. All other systems reviewed and are negative.         Objective:      /77 (BP Location: Left arm, Patient Position: Sitting, Cuff Size: Large)   Pulse 79   Temp 98 °F (36.7 °C) (Skin)   Ht 5' 1" (1.549 m)   Wt 79.1 kg (174 lb 6 oz)   BMI 32.95 kg/m²          Physical Exam  Constitutional:       Appearance: Normal appearance. She is obese. HENT:      Head: Normocephalic. Cardiovascular:      Rate and Rhythm: Normal rate and regular rhythm. Pulses: Normal pulses. Heart sounds: Normal heart sounds. Pulmonary:      Effort: Pulmonary effort is normal.      Breath sounds: Normal breath sounds. Abdominal:      General: Abdomen is flat. Bowel sounds are normal. There is no distension. Palpations: Abdomen is soft. There is no mass. Tenderness: There is no abdominal tenderness. There is no guarding or rebound. Negative signs include Sage's sign. Hernia: No hernia is present. Musculoskeletal:      Cervical back: Normal range of motion. Skin:     General: Skin is warm and dry. Neurological:      General: No focal deficit present. Mental Status: She is alert. Mental status is at baseline. Psychiatric:         Mood and Affect: Mood normal.         Behavior: Behavior normal.         Thought Content:  Thought content normal.         Judgment: Judgment normal.

## 2023-09-08 ENCOUNTER — APPOINTMENT (OUTPATIENT)
Dept: LAB | Facility: CLINIC | Age: 83
End: 2023-09-08
Payer: COMMERCIAL

## 2023-09-08 DIAGNOSIS — E78.5 HYPERLIPIDEMIA, UNSPECIFIED HYPERLIPIDEMIA TYPE: Primary | ICD-10-CM

## 2023-09-08 DIAGNOSIS — K80.50 BILIARY COLIC: ICD-10-CM

## 2023-09-08 DIAGNOSIS — E03.9 MYXEDEMA HEART DISEASE: ICD-10-CM

## 2023-09-08 DIAGNOSIS — I51.9 MYXEDEMA HEART DISEASE: ICD-10-CM

## 2023-09-08 DIAGNOSIS — E55.9 VITAMIN D DEFICIENCY: ICD-10-CM

## 2023-09-08 LAB
ALBUMIN SERPL BCP-MCNC: 3.9 G/DL (ref 3.5–5)
ALP SERPL-CCNC: 69 U/L (ref 34–104)
ALT SERPL W P-5'-P-CCNC: 11 U/L (ref 7–52)
ANION GAP SERPL CALCULATED.3IONS-SCNC: 5 MMOL/L
AST SERPL W P-5'-P-CCNC: 14 U/L (ref 13–39)
BASOPHILS # BLD AUTO: 0.04 THOUSANDS/ÂΜL (ref 0–0.1)
BASOPHILS NFR BLD AUTO: 1 % (ref 0–1)
BILIRUB SERPL-MCNC: 0.5 MG/DL (ref 0.2–1)
BUN SERPL-MCNC: 16 MG/DL (ref 5–25)
CALCIUM SERPL-MCNC: 8.8 MG/DL (ref 8.4–10.2)
CHLORIDE SERPL-SCNC: 109 MMOL/L (ref 96–108)
CO2 SERPL-SCNC: 25 MMOL/L (ref 21–32)
CREAT SERPL-MCNC: 0.69 MG/DL (ref 0.6–1.3)
EOSINOPHIL # BLD AUTO: 0.25 THOUSAND/ÂΜL (ref 0–0.61)
EOSINOPHIL NFR BLD AUTO: 6 % (ref 0–6)
ERYTHROCYTE [DISTWIDTH] IN BLOOD BY AUTOMATED COUNT: 13.6 % (ref 11.6–15.1)
GFR SERPL CREATININE-BSD FRML MDRD: 81 ML/MIN/1.73SQ M
GLUCOSE P FAST SERPL-MCNC: 90 MG/DL (ref 65–99)
HCT VFR BLD AUTO: 37.3 % (ref 34.8–46.1)
HGB BLD-MCNC: 12.3 G/DL (ref 11.5–15.4)
IMM GRANULOCYTES # BLD AUTO: 0.01 THOUSAND/UL (ref 0–0.2)
IMM GRANULOCYTES NFR BLD AUTO: 0 % (ref 0–2)
LYMPHOCYTES # BLD AUTO: 1.14 THOUSANDS/ÂΜL (ref 0.6–4.47)
LYMPHOCYTES NFR BLD AUTO: 25 % (ref 14–44)
MCH RBC QN AUTO: 29.9 PG (ref 26.8–34.3)
MCHC RBC AUTO-ENTMCNC: 33 G/DL (ref 31.4–37.4)
MCV RBC AUTO: 91 FL (ref 82–98)
MONOCYTES # BLD AUTO: 0.62 THOUSAND/ÂΜL (ref 0.17–1.22)
MONOCYTES NFR BLD AUTO: 14 % (ref 4–12)
NEUTROPHILS # BLD AUTO: 2.5 THOUSANDS/ÂΜL (ref 1.85–7.62)
NEUTS SEG NFR BLD AUTO: 54 % (ref 43–75)
NRBC BLD AUTO-RTO: 0 /100 WBCS
PLATELET # BLD AUTO: 245 THOUSANDS/UL (ref 149–390)
PMV BLD AUTO: 10.2 FL (ref 8.9–12.7)
POTASSIUM SERPL-SCNC: 4.2 MMOL/L (ref 3.5–5.3)
PROT SERPL-MCNC: 7 G/DL (ref 6.4–8.4)
RBC # BLD AUTO: 4.11 MILLION/UL (ref 3.81–5.12)
SODIUM SERPL-SCNC: 139 MMOL/L (ref 135–147)
WBC # BLD AUTO: 4.56 THOUSAND/UL (ref 4.31–10.16)

## 2023-09-08 PROCEDURE — 85025 COMPLETE CBC W/AUTO DIFF WBC: CPT

## 2023-09-08 PROCEDURE — 36415 COLL VENOUS BLD VENIPUNCTURE: CPT

## 2023-09-08 PROCEDURE — 80053 COMPREHEN METABOLIC PANEL: CPT

## 2023-09-11 ENCOUNTER — ANESTHESIA EVENT (OUTPATIENT)
Dept: PERIOP | Facility: HOSPITAL | Age: 83
End: 2023-09-11
Payer: COMMERCIAL

## 2023-09-11 ENCOUNTER — TELEPHONE (OUTPATIENT)
Dept: SURGERY | Facility: CLINIC | Age: 83
End: 2023-09-11

## 2023-09-11 NOTE — PRE-PROCEDURE INSTRUCTIONS
Pre-Surgery Instructions:   Medication Instructions   • albuterol (Ventolin HFA) 90 mcg/act inhaler Uses PRN- OK to take day of surgery   • ALPRAZolam (XANAX) 0.25 mg tablet Uses PRN- OK to take day of surgery   • amLODIPine-benazepril (LOTREL 5-10) 5-10 MG per capsule Take night before surgery   • ascorbic acid (VITAMIN C) 1000 MG tablet Stop taking 1 day prior to surgery. • buPROPion (WELLBUTRIN SR) 150 mg 12 hr tablet Take day of surgery. • cetirizine (ZyrTEC) 10 mg tablet Take day of surgery. • Cholecalciferol (VITAMIN D3) 1000 units CAPS Stop taking 1 day prior to surgery. • EPINEPHrine (EPIPEN) 0.3 mg/0.3 mL SOAJ Uses PRN- OK to take day of surgery   • Probiotic Product (Acidophilus) CHEW Stop taking 1 day prior to surgery. • zafirlukast (ACCOLATE) 20 MG tablet Take day of surgery. Medication instructions for day surgery reviewed. Please use only a sip of water to take your instructed medications. Avoid all over the counter vitamins, supplements and NSAIDS for one week prior to surgery per anesthesia guidelines. Tylenol is ok to take as needed. Instructed pt to hold ASA today and DOS, takes prn for pain. You will receive a call one business day prior to surgery with an arrival time and hospital directions. If your surgery is scheduled on a Monday, the hospital will be calling you on the Friday prior to your surgery. If you have not heard from anyone by 8pm, please call the hospital supervisor through the hospital  at [phone]. Elvis Price [phone]). Do not eat or drink anything after midnight the night before your surgery, including candy, mints, lifesavers, or chewing gum. Do not drink alcohol 24hrs before your surgery. Try not to smoke at least 24hrs before your surgery. Follow the pre surgery showering instructions as listed in the [facility] Surgical Experience Booklet” or otherwise provided by your surgeon's office. Do not shave the surgical area 24 hours before surgery. Do not apply any lotions, creams, including makeup, cologne, deodorant, or perfumes after showering on the day of your surgery. No contact lenses, eye make-up, or artificial eyelashes. Remove nail polish, including gel polish, and any artificial, gel, or acrylic nails if possible. Remove all jewelry including rings and body piercing jewelry. Wear causal clothing that is easy to take on and off. Consider your type of surgery. Keep any valuables, jewelry, piercings at home. Please bring any specially ordered equipment (sling, braces) if indicated. Arrange for a responsible person to drive you to and from the hospital on the day of your surgery. Visitor Guidelines discussed. Call the surgeon's office with any new illnesses, exposures, or additional questions prior to surgery. Please reference your [facility] Surgical Experience Booklet” for additional information to prepare for your upcoming surgery.

## 2023-09-11 NOTE — TELEPHONE ENCOUNTER
Patient did not stop her aspirin 5 days prior to her surgery. Per Dr. Xiong Fus as long as she does not take it today and day of surgery she is fine. Patient aware.

## 2023-09-12 ENCOUNTER — HOSPITAL ENCOUNTER (OUTPATIENT)
Facility: HOSPITAL | Age: 83
Setting detail: OUTPATIENT SURGERY
Discharge: HOME | End: 2023-09-12
Attending: SURGERY | Admitting: SURGERY
Payer: COMMERCIAL

## 2023-09-12 ENCOUNTER — ANESTHESIA (OUTPATIENT)
Dept: PERIOP | Facility: HOSPITAL | Age: 83
End: 2023-09-12
Payer: COMMERCIAL

## 2023-09-12 VITALS
DIASTOLIC BLOOD PRESSURE: 71 MMHG | BODY MASS INDEX: 32.85 KG/M2 | HEART RATE: 90 BPM | TEMPERATURE: 97 F | RESPIRATION RATE: 17 BRPM | WEIGHT: 174 LBS | HEIGHT: 61 IN | SYSTOLIC BLOOD PRESSURE: 140 MMHG | OXYGEN SATURATION: 97 %

## 2023-09-12 DIAGNOSIS — K80.50 BILIARY COLIC: Primary | ICD-10-CM

## 2023-09-12 PROCEDURE — 47562 LAPAROSCOPIC CHOLECYSTECTOMY: CPT | Performed by: SURGERY

## 2023-09-12 PROCEDURE — 88304 TISSUE EXAM BY PATHOLOGIST: CPT | Performed by: STUDENT IN AN ORGANIZED HEALTH CARE EDUCATION/TRAINING PROGRAM

## 2023-09-12 RX ORDER — CEFAZOLIN SODIUM 2 G/50ML
2000 SOLUTION INTRAVENOUS ONCE
Status: COMPLETED | OUTPATIENT
Start: 2023-09-12 | End: 2023-09-12

## 2023-09-12 RX ORDER — OXYCODONE HYDROCHLORIDE 5 MG/1
5 TABLET ORAL ONCE AS NEEDED
Status: COMPLETED | OUTPATIENT
Start: 2023-09-12 | End: 2023-09-12

## 2023-09-12 RX ORDER — OXYCODONE HYDROCHLORIDE 5 MG/1
5 TABLET ORAL EVERY 6 HOURS PRN
Qty: 10 TABLET | Refills: 0 | Status: SHIPPED | OUTPATIENT
Start: 2023-09-12 | End: 2023-09-22

## 2023-09-12 RX ORDER — ROCURONIUM BROMIDE 10 MG/ML
INJECTION, SOLUTION INTRAVENOUS AS NEEDED
Status: DISCONTINUED | OUTPATIENT
Start: 2023-09-12 | End: 2023-09-12

## 2023-09-12 RX ORDER — LIDOCAINE HYDROCHLORIDE 20 MG/ML
INJECTION, SOLUTION EPIDURAL; INFILTRATION; INTRACAUDAL; PERINEURAL AS NEEDED
Status: DISCONTINUED | OUTPATIENT
Start: 2023-09-12 | End: 2023-09-12

## 2023-09-12 RX ORDER — ACETAMINOPHEN 325 MG/1
975 TABLET ORAL ONCE
Status: COMPLETED | OUTPATIENT
Start: 2023-09-12 | End: 2023-09-12

## 2023-09-12 RX ORDER — ACETAMINOPHEN 325 MG/1
650 TABLET ORAL ONCE
Status: DISCONTINUED | OUTPATIENT
Start: 2023-09-12 | End: 2023-09-12

## 2023-09-12 RX ORDER — FENTANYL CITRATE/PF 50 MCG/ML
12.5 SYRINGE (ML) INJECTION
Status: DISCONTINUED | OUTPATIENT
Start: 2023-09-12 | End: 2023-09-12 | Stop reason: HOSPADM

## 2023-09-12 RX ORDER — PHENYLEPHRINE HCL IN 0.9% NACL 1 MG/10 ML
SYRINGE (ML) INTRAVENOUS AS NEEDED
Status: DISCONTINUED | OUTPATIENT
Start: 2023-09-12 | End: 2023-09-12

## 2023-09-12 RX ORDER — OXYCODONE HYDROCHLORIDE 5 MG/1
5 TABLET ORAL EVERY 4 HOURS PRN
Status: DISCONTINUED | OUTPATIENT
Start: 2023-09-12 | End: 2023-09-12 | Stop reason: HOSPADM

## 2023-09-12 RX ORDER — BUPIVACAINE HYDROCHLORIDE 2.5 MG/ML
INJECTION, SOLUTION EPIDURAL; INFILTRATION; INTRACAUDAL AS NEEDED
Status: DISCONTINUED | OUTPATIENT
Start: 2023-09-12 | End: 2023-09-12 | Stop reason: HOSPADM

## 2023-09-12 RX ORDER — ONDANSETRON 2 MG/ML
INJECTION INTRAMUSCULAR; INTRAVENOUS AS NEEDED
Status: DISCONTINUED | OUTPATIENT
Start: 2023-09-12 | End: 2023-09-12

## 2023-09-12 RX ORDER — SODIUM CHLORIDE, SODIUM LACTATE, POTASSIUM CHLORIDE, CALCIUM CHLORIDE 600; 310; 30; 20 MG/100ML; MG/100ML; MG/100ML; MG/100ML
20 INJECTION, SOLUTION INTRAVENOUS CONTINUOUS
Status: DISCONTINUED | OUTPATIENT
Start: 2023-09-12 | End: 2023-09-12 | Stop reason: HOSPADM

## 2023-09-12 RX ORDER — DEXAMETHASONE SODIUM PHOSPHATE 10 MG/ML
INJECTION, SOLUTION INTRAMUSCULAR; INTRAVENOUS AS NEEDED
Status: DISCONTINUED | OUTPATIENT
Start: 2023-09-12 | End: 2023-09-12

## 2023-09-12 RX ORDER — KETOROLAC TROMETHAMINE 30 MG/ML
INJECTION, SOLUTION INTRAMUSCULAR; INTRAVENOUS AS NEEDED
Status: DISCONTINUED | OUTPATIENT
Start: 2023-09-12 | End: 2023-09-12

## 2023-09-12 RX ORDER — FENTANYL CITRATE 50 UG/ML
INJECTION, SOLUTION INTRAMUSCULAR; INTRAVENOUS AS NEEDED
Status: DISCONTINUED | OUTPATIENT
Start: 2023-09-12 | End: 2023-09-12

## 2023-09-12 RX ORDER — OXYCODONE HYDROCHLORIDE 5 MG/1
2.5 TABLET ORAL EVERY 4 HOURS PRN
Status: DISCONTINUED | OUTPATIENT
Start: 2023-09-12 | End: 2023-09-12 | Stop reason: HOSPADM

## 2023-09-12 RX ORDER — ONDANSETRON 2 MG/ML
4 INJECTION INTRAMUSCULAR; INTRAVENOUS ONCE AS NEEDED
Status: DISCONTINUED | OUTPATIENT
Start: 2023-09-12 | End: 2023-09-12 | Stop reason: HOSPADM

## 2023-09-12 RX ORDER — ACETAMINOPHEN 325 MG/1
650 TABLET ORAL ONCE AS NEEDED
Status: DISCONTINUED | OUTPATIENT
Start: 2023-09-12 | End: 2023-09-12 | Stop reason: HOSPADM

## 2023-09-12 RX ORDER — HYDROMORPHONE HCL/PF 1 MG/ML
0.25 SYRINGE (ML) INJECTION
Status: DISCONTINUED | OUTPATIENT
Start: 2023-09-12 | End: 2023-09-12 | Stop reason: HOSPADM

## 2023-09-12 RX ORDER — PROPOFOL 10 MG/ML
INJECTION, EMULSION INTRAVENOUS AS NEEDED
Status: DISCONTINUED | OUTPATIENT
Start: 2023-09-12 | End: 2023-09-12

## 2023-09-12 RX ORDER — MAGNESIUM HYDROXIDE 1200 MG/15ML
LIQUID ORAL AS NEEDED
Status: DISCONTINUED | OUTPATIENT
Start: 2023-09-12 | End: 2023-09-12 | Stop reason: HOSPADM

## 2023-09-12 RX ORDER — ACETAMINOPHEN 325 MG/1
650 TABLET ORAL EVERY 6 HOURS PRN
Status: DISCONTINUED | OUTPATIENT
Start: 2023-09-12 | End: 2023-09-12 | Stop reason: HOSPADM

## 2023-09-12 RX ADMIN — PROPOFOL 20 MG: 10 INJECTION, EMULSION INTRAVENOUS at 13:04

## 2023-09-12 RX ADMIN — PROPOFOL 30 MG: 10 INJECTION, EMULSION INTRAVENOUS at 13:02

## 2023-09-12 RX ADMIN — ROCURONIUM BROMIDE 50 MG: 10 INJECTION, SOLUTION INTRAVENOUS at 11:34

## 2023-09-12 RX ADMIN — FENTANYL CITRATE 50 MCG: 50 INJECTION, SOLUTION INTRAMUSCULAR; INTRAVENOUS at 11:34

## 2023-09-12 RX ADMIN — CEFAZOLIN SODIUM 2000 MG: 2 SOLUTION INTRAVENOUS at 11:40

## 2023-09-12 RX ADMIN — OXYCODONE HYDROCHLORIDE 5 MG: 5 TABLET ORAL at 15:23

## 2023-09-12 RX ADMIN — ONDANSETRON 4 MG: 2 INJECTION INTRAMUSCULAR; INTRAVENOUS at 12:44

## 2023-09-12 RX ADMIN — FENTANYL CITRATE 12.5 MCG: 50 INJECTION INTRAMUSCULAR; INTRAVENOUS at 13:51

## 2023-09-12 RX ADMIN — PROPOFOL 130 MG: 10 INJECTION, EMULSION INTRAVENOUS at 11:34

## 2023-09-12 RX ADMIN — ROCURONIUM BROMIDE 20 MG: 10 INJECTION, SOLUTION INTRAVENOUS at 12:27

## 2023-09-12 RX ADMIN — DEXAMETHASONE SODIUM PHOSPHATE 4 MG: 10 INJECTION, SOLUTION INTRAMUSCULAR; INTRAVENOUS at 11:34

## 2023-09-12 RX ADMIN — Medication 100 MCG: at 12:00

## 2023-09-12 RX ADMIN — SUGAMMADEX 200 MG: 100 INJECTION, SOLUTION INTRAVENOUS at 12:46

## 2023-09-12 RX ADMIN — KETOROLAC TROMETHAMINE 30 MG: 30 INJECTION, SOLUTION INTRAMUSCULAR; INTRAVENOUS at 12:50

## 2023-09-12 RX ADMIN — ACETAMINOPHEN 975 MG: 325 TABLET, FILM COATED ORAL at 11:24

## 2023-09-12 RX ADMIN — SODIUM CHLORIDE, SODIUM LACTATE, POTASSIUM CHLORIDE, AND CALCIUM CHLORIDE 20 ML/HR: .6; .31; .03; .02 INJECTION, SOLUTION INTRAVENOUS at 10:30

## 2023-09-12 RX ADMIN — LIDOCAINE HYDROCHLORIDE 80 MG: 20 INJECTION, SOLUTION EPIDURAL; INFILTRATION; INTRACAUDAL; PERINEURAL at 11:34

## 2023-09-12 RX ADMIN — FENTANYL CITRATE 50 MCG: 50 INJECTION, SOLUTION INTRAMUSCULAR; INTRAVENOUS at 12:45

## 2023-09-12 NOTE — DISCHARGE INSTR - AVS FIRST PAGE
Post-Operative Care Instructions             Dr. Josy Shepherd M.D.    1. General: David Gregory will feel pulling sensations around the wound and/or aches and pains around the incisions. This is normal. Even minor surgery is a change in your body and this is your body’s way of reaction to it. If you have had abdominal surgery, it may help to support the incision with a small pillow or blanket for comfort when moving or coughing. 2. Wound care:    Bandage/Dressing - Make sure to remove the bandage in about 48 hours, unless instructed otherwise. You usually don't have to redress the wound after 24-48 hours, unless for comfort. Keep the incision clean and dry. Let air get to it. If the Steri-Strips fall off, just keep the wound clean. Glue - Leave glue alone, it will fall off on its own, no need for an additional dressings    3. Water: You may shower over the wound, unless there are drain tubes left in place. Do not bathe or use a pool or hot tub until cleared by the physician. You may shower right over the staples, glue or Steri-Strips and rinse wound with soapy water but do not scrub incision pat dry when you are done. 4. Activity: You may go up and down stairs, walk as much as you are comfortable, but walk at least 3 times each day. If you have had abdominal or hernia surgery, do not lift anything heavier than 15 pounds for at least 4 weeks. 5. Diet: You may resume a regular diet. If you had a same-day surgery or overnight stay surgery, you may wish to eat lightly for a few days: soups, crackers, and sandwiches. You may resume a regular diet when ready. 6. Medications: Resume all of your previous medications, unless told otherwise by the doctor. Tylenol and ibuoprofen is always fine, unless you are taking any narcotic pain medication containing Tylenol (such as Percocet, Darvocet, Vicodin, or anything containing acetaminophen). Do not take Tylenol if you're taking these medications.  You do not need to take the narcotic pain medications unless you are having significant pain and discomfort. 7. Driving: He will need someone to drive you home on the day of surgery. Do not drive or make any important decisions while on narcotic pain medication or 24 hours and after anesthesia or sedation for surgery. Generally, you may drive when your off all narcotic pain medications, and you can turn in your seat comfortably to check your blind spot. 8. Upset Stomach: You may take Maalox, Tums, or similar items for an upset stomach. If your narcotic pain medication causes an upset stomach, do not take it on an empty stomach. Try taking it with at least some crackers or toast.     9. Constipation: Patients often experienced constipation after surgery. You may take over-the-counter medication for this, such as Metamucil, Senokot, Dulcolax, milk of magnesia, etc. You may take a suppository unless you have had anorectal surgery such as a procedure on your hemorrhoids. If you experience significant nausea or vomiting after abdominal surgery, call the office before trying any of these medications. 10. Call the office: If you are experiencing any of the following, fevers above 101.5°, significant nausea or vomiting, if the wound develops drainage and/or is excessive redness around the wound, or if you have significant diarrhea or other worsening symptoms. 11. Pain: You may be given a prescription for pain. This will be given to the hospital, the day of surgery. 12. Sexual Activity: You may resume sexual activity when you feel ready and comfortable and your incision is sealed and healed without apparent infection risk.     [place] [facility]  Phone: [phone]

## 2023-09-12 NOTE — ANESTHESIA PREPROCEDURE EVALUATION
Procedure:  CHOLECYSTECTOMY LAPAROSCOPIC; POSSIBLE OPEN (Abdomen)    Relevant Problems   CARDIO   (+) Benign essential hypertension      MUSCULOSKELETAL   (+) Arthritis of right knee      NEURO/PSYCH   (+) Anxiety   (+) Anxiety and depression      PULMONARY   (+) Mild persistent asthma without complication        Physical Exam    Airway    Mallampati score: II  TM Distance: >3 FB  Neck ROM: full     Dental   No notable dental hx     Cardiovascular  Rhythm: regular, Rate: normal,     Pulmonary  Breath sounds clear to auscultation,     Other Findings        Anesthesia Plan  ASA Score- 2     Anesthesia Type- general with ASA Monitors. Additional Monitors:   Airway Plan: ETT. Plan Factors-Exercise tolerance (METS): >4 METS. Chart reviewed. EKG reviewed. Imaging results reviewed. Existing labs reviewed. Patient summary reviewed. Patient is not a current smoker. Obstructive sleep apnea risk education given perioperatively. Induction- intravenous. Postoperative Plan- Plan for postoperative opioid use. Informed Consent- Anesthetic plan and risks discussed with patient. I personally reviewed this patient with the CRNA. Discussed and agreed on the Anesthesia Plan with the CRNA. Glenys Mead

## 2023-09-12 NOTE — ANESTHESIA POSTPROCEDURE EVALUATION
Post-Op Assessment Note    CV Status:  Stable  Pain Score: 0    Pain management: adequate  Multimodal analgesia used between 6 hours prior to anesthesia start to PACU discharge    Mental Status:  Alert and awake   Hydration Status:  Euvolemic   PONV Controlled:  Controlled   Airway Patency:  Patent  Airway: intubated      Post Op Vitals Reviewed: Yes      Staff: Anesthesiologist         No notable events documented.     /63 (09/12/23 1316)    Temp 97.7 °F (36.5 °C) (09/12/23 1316)    Pulse 71 (09/12/23 1316)   Resp 16 (09/12/23 1316)    SpO2   99

## 2023-09-12 NOTE — OP NOTE
OPERATIVE REPORT  PATIENT NAME: Fernando Ellis    :  1940  MRN: [id number]  Pt Location: [facility] OR ROOM 10    SURGERY DATE: 2023    Surgeon(s) and Role:     * Sherly Castro MD - Primary     * Tej Dotson MD - Assisting    Preop Diagnosis:  Biliary colic [D67.55]    Post-Op Diagnosis Codes:     * Biliary colic [S78.37]    Procedure(s):  CHOLECYSTECTOMY LAPAROSCOPIC    Specimen(s):  ID Type Source Tests Collected by Time Destination   1 :  Tissue Gallbladder TISSUE EXAM Sherly Castro MD 2023 1157        Estimated Blood Loss:   Minimal    Drains:  * No LDAs found *    Anesthesia Type:   General    Operative Indications:  Biliary colic [I21.14]  [id number] female with a history of biliary colic following an emergency department visit is July presented for discussion for elective cholecystectomy. After discussion of risks and benefits, she opted to proceed to the operating room for planned laparoscopic cholecystectomy. Operative Findings:  Critical view of safety obtained from the medial and lateral view prior to clipping and cutting the duct. Complications:   None    Procedure and Technique:  The patient was seen in the Holding Area. The risks, benefits, complications, treatment options, and expected outcomes were discussed with the patient and/or family. There was concurrence with the proposed plan and informed consent was obtained. The site of surgery was properly noted/marked. The patient was taken to Operating Room, identified as Fernando Ellis and the procedure verified as Cholecystectomy. The patient was placed in the supine position with both arms out and general anesthesia was induced, along with placement of orogastric tube. Prophylactic antibiotics were administered in concordance with the patient's stated allergies. The abdomen was prepped and draped in a sterile fashion.  A Timeout was preformed with all members of the surgical team confirming the correct patient and procedure. A left upper quadrant stab incision was made and a Veress needle was used to insufflate the abdomen to 15mmHg. The veress needle was removed and 12mm port was placed using an optical entry technique in the periumbilical area. Additional 5mm ports were placed under direct vision in the epigastrum, and two right upper quadrant positions. A careful evaluation of the entire abdomen was carried out. The patient was placed in Reverse Trendelenburg and left lateral decubitus position. The visualized gallbladder appeared to be inflamed with no further pathology was noted in the abdomen. The gallbladder was identified, the fundus grasped and retracted cephalad. Adhesions were lysed bluntly and with the electrocautery where indicated, taking care not to injure any adjacent organs or viscus. The infundibulum was grasped and retracted laterally, exposing the peritoneum overlying the hepatocystic triangle. The peritoneum overlying the gallbladder was then opened on the medial and lateral sides of the gallbladder. A window was then developed between the gallbladder body and the cystic plate. We then began dissection using sharp and blunt dissection at the level of the infundibulum, taking care to keep all dissection above the level of Rouviere's sulcus. All fat was then cleared from the hepatocystic triangle. The cystic duct was clearly identified and dissected circumferentially, as was the cystic artery, as the only two tubular structures leading into the gallbladder. The entire team then confirmed that we had dissected 1/3 of the gallbladder off of the cystic plate, all the fat had been cleared from the hepatocystic triangle, and there were two, and only two structures leading into the gallbladder. The critical view of safety was visualized in both the medial and lateral view. The cystic duct was then doubly clipped on the distal side, and divided. The cystic artery was singly clipped and divided.  The remained of the gallbladder was then dissected free of the liver, placed in an endocatch bag, and passed off the field and sent to pathology. The area of dissection was then copiously irrigated and suctioned. Hemostasis was confirmed, and there was no evidence of bile leakage from the cystic duct stump. The 12mm port site fascia was closed with an 0-Vicryl figure-of-eight using a laparoscopic suture passer. All skin incisions were closed using MonocryI suture in a subcuticular fashion. The instrument, sponge, and needle counts were correct at the conclusion of the case. The patient was then taken to PACU in stable condition. I was present for the entire procedure.     Patient Disposition:  PACU  and hemodynamically stable        SIGNATURE: Tanesha Eric MD  DATE: September 12, 2023  TIME: 12:57 PM

## 2023-09-12 NOTE — INTERVAL H&P NOTE
H&P reviewed. After examining the patient I find no changes in the patients condition since the H&P had been written. We will plan on laparoscopic cholecystectomy, patient is amenable to risk and benefits and we will proceed back to the operating room expeditiously.   Vitals:    09/12/23 0952   BP: 159/81   Pulse: 75   Resp: 20   Temp: 98.9 °F (37.2 °C)   SpO2: 98%

## 2023-09-14 ENCOUNTER — NURSE TRIAGE (OUTPATIENT)
Dept: FAMILY MEDICINE CLINIC | Facility: MEDICAL CENTER | Age: 83
End: 2023-09-14

## 2023-09-14 NOTE — TELEPHONE ENCOUNTER
Patient instructed on how to use the spirometer. Wants to know if she can have VNA for someone to asses her lungs . Denies SOB,wheezing     Reason for Disposition  • General activity, questions about    Answer Assessment - Initial Assessment Questions  1. SYMPTOM: "What's the main symptom you're concerned about?" (e.g., pain, fever, vomiting)      Asking if using her Incentive spirometer correctly  2. ONSET: "When did it  start?"      2 days ago  3. SURGERY: "What surgery was performed?"      Gallbladder  4. DATE of SURGERY: "When was surgery performed?"       9/12  5. ANESTHESIA: " What type of anesthesia did you have?" (e.g., general, spinal, epidural, local)        6. PAIN: "Is there any pain?" If Yes, ask: "How bad is it?"  (Scale 1-10; or mild, moderate, severe)        7. FEVER: "Do you have a fever?" If Yes, ask: "What is your temperature, how was it measured, and when did it start?"       8. VOMITING: "Is there any vomiting?" If yes, ask: "How many times?"        9. BLEEDING: "Is there any bleeding?" If Yes, ask: "How much?" and "Where?"      no  10.  OTHER SYMPTOMS: "Do you have any other symptoms?" (e.g., drainage from wound, painful urination, constipation)    Protocols used: POST-OP SYMPTOMS AND QUESTIONS-ADULT-[place]

## 2023-09-15 PROCEDURE — 88304 TISSUE EXAM BY PATHOLOGIST: CPT | Performed by: STUDENT IN AN ORGANIZED HEALTH CARE EDUCATION/TRAINING PROGRAM

## 2023-09-26 DIAGNOSIS — I10 BENIGN HYPERTENSION: ICD-10-CM

## 2023-09-26 RX ORDER — AMLODIPINE BESYLATE AND BENAZEPRIL HYDROCHLORIDE 5; 10 MG/1; MG/1
CAPSULE ORAL
Qty: 90 CAPSULE | Refills: 0 | Status: SHIPPED | OUTPATIENT
Start: 2023-09-26

## 2023-10-03 ENCOUNTER — OFFICE VISIT (OUTPATIENT)
Dept: INTERNAL MEDICINE CLINIC | Facility: CLINIC | Age: 83
End: 2023-10-03
Payer: COMMERCIAL

## 2023-10-03 VITALS
SYSTOLIC BLOOD PRESSURE: 138 MMHG | HEART RATE: 75 BPM | DIASTOLIC BLOOD PRESSURE: 72 MMHG | HEIGHT: 61 IN | TEMPERATURE: 97.2 F | WEIGHT: 175 LBS | OXYGEN SATURATION: 99 % | BODY MASS INDEX: 33.04 KG/M2

## 2023-10-03 DIAGNOSIS — J45.30 MILD PERSISTENT ASTHMA WITHOUT COMPLICATION: ICD-10-CM

## 2023-10-03 DIAGNOSIS — F32.A ANXIETY AND DEPRESSION: ICD-10-CM

## 2023-10-03 DIAGNOSIS — I10 BENIGN ESSENTIAL HYPERTENSION: ICD-10-CM

## 2023-10-03 DIAGNOSIS — Z00.00 MEDICARE ANNUAL WELLNESS VISIT, SUBSEQUENT: Primary | ICD-10-CM

## 2023-10-03 DIAGNOSIS — Z23 NEED FOR INFLUENZA VACCINATION: ICD-10-CM

## 2023-10-03 DIAGNOSIS — F41.9 ANXIETY AND DEPRESSION: ICD-10-CM

## 2023-10-03 DIAGNOSIS — E78.5 DYSLIPIDEMIA: ICD-10-CM

## 2023-10-03 DIAGNOSIS — K80.50 BILIARY COLIC: ICD-10-CM

## 2023-10-03 PROCEDURE — G0439 PPPS, SUBSEQ VISIT: HCPCS | Performed by: INTERNAL MEDICINE

## 2023-10-03 PROCEDURE — 90662 IIV NO PRSV INCREASED AG IM: CPT | Performed by: INTERNAL MEDICINE

## 2023-10-03 PROCEDURE — G0008 ADMIN INFLUENZA VIRUS VAC: HCPCS | Performed by: INTERNAL MEDICINE

## 2023-10-03 PROCEDURE — 99214 OFFICE O/P EST MOD 30 MIN: CPT | Performed by: INTERNAL MEDICINE

## 2023-10-03 NOTE — PROGRESS NOTES
Assessment and Plan:     Problem List Items Addressed This Visit        Respiratory    Mild persistent asthma without complication     Stable  On Accolate. Albuterol PRN  Sees an allergist          Relevant Orders    CBC and differential       Cardiovascular and Mediastinum    Benign essential hypertension     Stable  On amlodipine-benazepril             Other    Anxiety and depression     Stable  Continue wellbutrin          Relevant Orders    TSH, 3rd generation with Free T4 reflex    Dyslipidemia     Follow a low fat diet. Relevant Orders    Comprehensive metabolic panel    Lipid Panel with Direct LDL reflex    Biliary colic     S/p cholecystectomy   Doing well post op          Other Visit Diagnoses     Medicare annual wellness visit, subsequent    -  Primary           Preventive health issues were discussed with patient, and age appropriate screening tests were ordered as noted in patient's After Visit Summary. Personalized health advice and appropriate referrals for health education or preventive services given if needed, as noted in patient's After Visit Summary. History of Present Illness:     Patient presents for a Medicare Wellness Visit    Eulalia Salcedo is here today for follow up  She is doing well. She had her gallbladder removed about 2 weeks ago. She denies any pain. Normal appetite. Normal bowel movements. No breathing issues. No recent albuterol use. Patient Care Team:  Mine Maldonado as PCP - General (Internal Medicine)     Review of Systems:     Review of Systems   Constitutional: Negative for activity change, appetite change, fatigue and unexpected weight change. Eyes: Negative for visual disturbance. Respiratory: Negative for cough, chest tightness, shortness of breath and wheezing. Cardiovascular: Negative for chest pain, palpitations and leg swelling. Gastrointestinal: Negative for abdominal pain, blood in stool, constipation and diarrhea.    Genitourinary: Negative for difficulty urinating. Musculoskeletal: Negative for arthralgias. Skin: Negative for rash. Neurological: Negative for dizziness, light-headedness and headaches. Psychiatric/Behavioral: Negative for sleep disturbance. Problem List:     Patient Active Problem List   Diagnosis   • Benign essential hypertension   • Anxiety and depression   • Dyslipidemia   • History of cervical cancer   • Chronic seasonal allergic rhinitis due to pollen   • Mild persistent asthma without complication   • Allergic rhinitis due to animal (cat) (dog) hair and dander   • Intrinsic atopic dermatitis   • Shellfish allergy   • Laryngopharyngeal reflux   • Anxiety   • Obesity with body mass index 30 or greater   • Anterior tibialis tendonitis of left leg   • Arthritis of right knee   • Biliary colic      Past Medical and Surgical History:     Past Medical History:   Diagnosis Date   • Anterior tibialis tendonitis of left leg    • Cervical cancer ([address])    • COVID 10/25/2021   • COVID-19 virus infection 10/20/2021   • Shingles    • Stress fracture of right tibia 04/2022    tibial plateau     Past Surgical History:   Procedure Laterality Date   • BLADDER SURGERY     • HALLUX VALGUS CORRECTION Right    • NH LAPAROSCOPY SURG CHOLECYSTECTOMY N/A 9/12/2023    Procedure: CHOLECYSTECTOMY LAPAROSCOPIC;  Surgeon:  Gurjit Kirkpatrick MD;  Location: [facility];  Service: General   • TOTAL ABDOMINAL HYSTERECTOMY  1987    Cervical CA      Family History:     Family History   Problem Relation Age of Onset   • No Known Problems Mother    • Heart disease Father    • Heart disease Brother    • Diabetes Brother    • No Known Problems Son    • No Known Problems Daughter    • No Known Problems Daughter    • Alcohol abuse Neg Hx    • Substance Abuse Neg Hx    • Mental illness Neg Hx    • Depression Neg Hx       Social History:     Social History     Socioeconomic History   • Marital status:      Spouse name: None   • Number of children: 3   • Years of education: None   • Highest education level: None   Occupational History   • Occupation: Retired     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   Tobacco Use   • Smoking status: Never   • Smokeless tobacco: Never   Vaping Use   • Vaping Use: Never used   Substance and Sexual Activity   • Alcohol use: Yes     Alcohol/week: 1.0 standard drink of alcohol     Types: 1 Glasses of wine per week     Comment: socially   • Drug use: Never   • Sexual activity: Not Currently   Other Topics Concern   • None   Social History Narrative    Single    Retired, advertisement                Marital: . Lives With: Alone. Home Environment: [facility], Uses air , Has a window air conditioner, The basement is dry, Does not use a dehumidifier, The floors are wood, Uses baseboard heating, Uses natural gas heating, [place]. Pets: None. Occupation: Retired, [employer]. Personal Habits:Cigarette Use: Never Smoked Cigarettes. Social Determinants of Health     Financial Resource Strain: Low Risk  (10/3/2023)    Overall Financial Resource Strain (CARDIA)    • Difficulty of Paying Living Expenses: Not very hard   Food Insecurity: Not on file   Transportation Needs: No Transportation Needs (10/3/2023)    PRAPARE - Transportation    • Lack of Transportation (Medical): No    • Lack of Transportation (Non-Medical):  No   Physical Activity: Inactive (7/5/2022)    Exercise Vital Sign    • Days of Exercise per Week: 0 days    • Minutes of Exercise per Session: 0 min   Stress: Not on file   Social Connections: Not on file   Intimate Partner Violence: Not on file   Housing Stability: Not on file      Medications and Allergies:     Current Outpatient Medications   Medication Sig Dispense Refill   • albuterol (Ventolin HFA) 90 mcg/act inhaler Inhale 2 puffs every 6 (six) hours as needed for wheezing 18 g 3   • ALBUTEROL IN Inhale     • ALPRAZolam (XANAX) 0.25 mg tablet Take 1 tablet (0.25 mg total) by mouth daily as needed for anxiety 10 tablet 0   • amLODIPine-benazepril (LOTREL 5-10) 5-10 MG per capsule TAKE ONE CAPSULE BY MOUTH EVERY DAY 90 capsule 0   • ascorbic acid (VITAMIN C) 1000 MG tablet      • aspirin 500 MG tablet Take 500 mg by mouth every 6 (six) hours as needed for mild pain LD 9/10     • buPROPion (WELLBUTRIN SR) 150 mg 12 hr tablet Take 1 tablet (150 mg total) by mouth 2 (two) times a day 180 tablet 0   • cetirizine (ZyrTEC) 10 mg tablet Take 10 mg by mouth daily     • Cholecalciferol (VITAMIN D3) 1000 units CAPS Daily     • EPINEPHrine (EPIPEN) 0.3 mg/0.3 mL SOAJ Inject 0.3 mL (0.3 mg total) into a muscle once for 1 dose 0.6 mL 0   • mometasone (NASONEX) 50 mcg/act nasal spray 2 sprays into each nostril daily 17 g 11   • Probiotic Product (Acidophilus) CHEW      • zafirlukast (ACCOLATE) 20 MG tablet Take 1 tablet (20 mg total) by mouth 2 (two) times a day 180 tablet 3     No current facility-administered medications for this visit.      Allergies   Allergen Reactions   • Codeine Other (See Comments) and Tachycardia   • Shellfish-Derived Products - Food Allergy Shortness Of Breath   • Iodine - Food Allergy Other (See Comments)      Immunizations:     Immunization History   Administered Date(s) Administered   • COVID-19 PFIZER VACCINE 0.3 ML IM 04/13/2021, 05/06/2021   • COVID-19 Pfizer vac (Toribio-sucrose, gray cap) 12 yr+ IM 04/09/2022   • Fluzone Split Quad 0.5 mL 10/03/2016   • INFLUENZA 10/13/2011, 11/09/2014, 11/20/2014, 10/03/2016, 10/01/2017, 10/13/2022   • Influenza, high dose seasonal 0.7 mL 10/30/2020, 11/11/2021, 10/13/2022   • Influenza, seasonal, injectable 10/06/2012   • Pneumococcal Conjugate 13-Valent 11/10/2017   • Pneumococcal Polysaccharide PPV23 11/20/2006   • Tdap 02/07/2013   • Zoster 10/20/2013, 07/13/2016   • influenza, trivalent, adjuvanted 10/02/2018, 10/04/2019      Health Maintenance:         Topic Date Due   • Breast Cancer Screening: Mammogram 10/05/2023         Topic Date Due   • COVID-19 Vaccine (4 - Pfizer series) 06/04/2022   • Influenza Vaccine (1) 09/01/2023      Medicare Screening Tests and Risk Assessments:     Yeny Shipman is here for her Subsequent Wellness visit. Last Medicare Wellness visit information reviewed, patient interviewed and updates made to the record today. Health Risk Assessment:   Patient rates overall health as very good. Patient feels that their physical health rating is same. Patient is satisfied with their life. Eyesight was rated as same. Hearing was rated as same. Patient feels that their emotional and mental health rating is same. Patients states they are never, rarely angry. Patient states they are never, rarely unusually tired/fatigued. Pain experienced in the last 7 days has been some. Patient's pain rating has been 3/10. Patient states that she has experienced no weight loss or gain in last 6 months. Fall Risk Screening: In the past year, patient has experienced: no history of falling in past year      Urinary Incontinence Screening:   Patient has leaked urine accidently in the last six months. Home Safety:  Patient has trouble with stairs inside or outside of their home. Patient has working smoke alarms and has working carbon monoxide detector. Home safety hazards include: none. Nutrition:   Current diet is Regular. Medications:   Patient is currently taking over-the-counter supplements. OTC medications include: see medication list. Patient is able to manage medications. Activities of Daily Living (ADLs)/Instrumental Activities of Daily Living (IADLs):   Walk and transfer into and out of bed and chair?: Yes  Dress and groom yourself?: Yes    Bathe or shower yourself?: Yes    Feed yourself?  Yes  Do your laundry/housekeeping?: Yes  Manage your money, pay your bills and track your expenses?: Yes  Make your own meals?: Yes    Do your own shopping?: Yes    Previous Hospitalizations:   Any hospitalizations or ED visits within the last 12 months?: Yes    How many hospitalizations have you had in the last year?: 1-2    Advance Care Planning:   Living will: Yes    Advanced directive: Yes      PREVENTIVE SCREENINGS      Cardiovascular Screening:    General: Screening Not Indicated and History Lipid Disorder      Diabetes Screening:     General: Screening Current      Breast Cancer Screening:     General: Screening Current      Cervical Cancer Screening:    General: Screening Not Indicated and History Cervical Cancer      Lung Cancer Screening:     General: Screening Not Indicated    Screening, Brief Intervention, and Referral to Treatment (SBIRT)    Screening  Typical number of drinks in a day: 0  Typical number of drinks in a week: 0  Interpretation: Low risk drinking behavior. Single Item Drug Screening:  How often have you used an illegal drug (including marijuana) or a prescription medication for non-medical reasons in the past year? never    Single Item Drug Screen Score: 0  Interpretation: Negative screen for possible drug use disorder    No results found. Physical Exam:     /72   Pulse 75   Temp (!) 97.2 °F (36.2 °C)   Ht 5' 1" (1.549 m)   Wt 79.4 kg (175 lb)   SpO2 99%   BMI 33.07 kg/m²     Physical Exam  Vitals reviewed. Constitutional:       Appearance: Normal appearance. HENT:      Head: Normocephalic and atraumatic. Eyes:      Conjunctiva/sclera: Conjunctivae normal.   Cardiovascular:      Rate and Rhythm: Normal rate and regular rhythm. Heart sounds: Normal heart sounds. Pulmonary:      Effort: Pulmonary effort is normal.      Breath sounds: Normal breath sounds. Abdominal:      General: Bowel sounds are normal.      Palpations: Abdomen is soft. Tenderness: There is no abdominal tenderness. Comments: Trochar sites WNL    Musculoskeletal:         General: Normal range of motion. Cervical back: Neck supple. Right lower leg: No edema. Left lower leg: No edema. Skin:     General: Skin is warm and dry. Neurological:      Mental Status: She is alert and oriented to person, place, and time.    Psychiatric:         Mood and Affect: Mood normal.         Behavior: Behavior normal.          PO Bueno

## 2023-10-03 NOTE — PROGRESS NOTES
Name: Sofiya Laguerre      : 1940      MRN: [id number]  Encounter Provider: PO Vigil  Encounter Date: 10/3/2023   Encounter department: [id number] [facility]     1. Benign essential hypertension  Assessment & Plan:  Stable  On amlodipine-benazepril       2. Biliary colic  Assessment & Plan:  S/p cholecystectomy   Doing well post op        3. Mild persistent asthma without complication  Assessment & Plan:  Stable  On Accolate. Albuterol PRN  Sees an allergist     Orders:  -     CBC and differential; Future    4. Anxiety and depression  Assessment & Plan:  Stable  Continue wellbutrin     Orders:  -     TSH, 3rd generation with Free T4 reflex; Future    5. Dyslipidemia  -     Comprehensive metabolic panel; Future  -     Lipid Panel with Direct LDL reflex; Future         Subjective      Naye Levy is here today for follow up  She is doing well. She had her gallbladder removed about 2 weeks ago. She denies any pain. Normal appetite. Normal bowel movements. No breathing issues. No recent albuterol use. Review of Systems   Constitutional: Negative for activity change, appetite change, fatigue and unexpected weight change. Eyes: Negative for visual disturbance. Respiratory: Negative for cough, chest tightness, shortness of breath and wheezing. Cardiovascular: Negative for chest pain, palpitations and leg swelling. Gastrointestinal: Negative for abdominal pain, blood in stool, constipation and diarrhea. Genitourinary: Negative for difficulty urinating. Musculoskeletal: Negative for arthralgias. Skin: Negative for rash. Neurological: Negative for dizziness, light-headedness and headaches. Psychiatric/Behavioral: Negative for sleep disturbance.        Current Outpatient Medications on File Prior to Visit   Medication Sig   • albuterol (Ventolin HFA) 90 mcg/act inhaler Inhale 2 puffs every 6 (six) hours as needed for wheezing   • ALBUTEROL IN Inhale   • ALPRAZolam (XANAX) 0.25 mg tablet Take 1 tablet (0.25 mg total) by mouth daily as needed for anxiety   • amLODIPine-benazepril (LOTREL 5-10) 5-10 MG per capsule TAKE ONE CAPSULE BY MOUTH EVERY DAY   • ascorbic acid (VITAMIN C) 1000 MG tablet    • aspirin 500 MG tablet Take 500 mg by mouth every 6 (six) hours as needed for mild pain LD 9/10   • buPROPion (WELLBUTRIN SR) 150 mg 12 hr tablet Take 1 tablet (150 mg total) by mouth 2 (two) times a day   • cetirizine (ZyrTEC) 10 mg tablet Take 10 mg by mouth daily   • Cholecalciferol (VITAMIN D3) 1000 units CAPS Daily   • EPINEPHrine (EPIPEN) 0.3 mg/0.3 mL SOAJ Inject 0.3 mL (0.3 mg total) into a muscle once for 1 dose   • mometasone (NASONEX) 50 mcg/act nasal spray 2 sprays into each nostril daily   • Probiotic Product (Acidophilus) CHEW    • zafirlukast (ACCOLATE) 20 MG tablet Take 1 tablet (20 mg total) by mouth 2 (two) times a day   • [DISCONTINUED] Chlorpheniramine-DM (CORICIDIN COUGH/COLD) 4-30 MG TABS Take 1 tablet by mouth daily at bedtime as needed (cough)   • [DISCONTINUED] conjugated estrogens (PREMARIN) vaginal cream Insert 0.5 g into the vagina once a week   • [DISCONTINUED] ipratropium (ATROVENT) 0.03 % nasal spray 2 sprays into each nostril 2 (two) times a day   • [DISCONTINUED] Mometasone Furoate (Asmanex HFA) 200 MCG/ACT AERO Inhale 2 puffs (400 mcg total) 2 (two) times a day   • [DISCONTINUED] mupirocin (BACTROBAN) 2 % ointment Apply topically 2 (two) times a day for 7 days X 7 days to open area   • [DISCONTINUED] omeprazole (PriLOSEC) 40 MG capsule Take 1 capsule (40 mg total) by mouth daily   • [DISCONTINUED] ondansetron (ZOFRAN) 4 mg tablet Take 1 tablet (4 mg total) by mouth every 8 (eight) hours as needed for nausea or vomiting for up to 4 days       Objective     /72   Pulse 75   Temp (!) 97.2 °F (36.2 °C)   Ht 5' 1" (1.549 m)   Wt 79.4 kg (175 lb)   SpO2 99%   BMI 33.07 kg/m²     Physical Exam  Vitals reviewed. Constitutional:       Appearance: Normal appearance. HENT:      Head: Normocephalic and atraumatic. Eyes:      Conjunctiva/sclera: Conjunctivae normal.   Cardiovascular:      Rate and Rhythm: Normal rate and regular rhythm. Heart sounds: Normal heart sounds. Pulmonary:      Effort: Pulmonary effort is normal.      Breath sounds: Normal breath sounds. Abdominal:      General: Bowel sounds are normal.      Palpations: Abdomen is soft. Musculoskeletal:         General: Normal range of motion. Cervical back: Neck supple. Right lower leg: No edema. Left lower leg: No edema. Skin:     General: Skin is warm and dry. Neurological:      Mental Status: She is alert and oriented to person, place, and time.    Psychiatric:         Mood and Affect: Mood normal.         Behavior: Behavior normal.       PO Shepherd

## 2023-10-12 ENCOUNTER — TELEPHONE (OUTPATIENT)
Age: 83
End: 2023-10-12

## 2023-10-12 NOTE — TELEPHONE ENCOUNTER
Caller: Patient    Doctor/Office: Daniel Vincent     Call regarding :  Appt      Call was transferred to: General surgery

## 2023-10-27 ENCOUNTER — OFFICE VISIT (OUTPATIENT)
Dept: SURGERY | Facility: CLINIC | Age: 83
End: 2023-10-27

## 2023-10-27 DIAGNOSIS — K80.50 BILIARY COLIC: Primary | ICD-10-CM

## 2023-10-27 PROCEDURE — 99024 POSTOP FOLLOW-UP VISIT: CPT | Performed by: SURGERY

## 2023-10-28 NOTE — ASSESSMENT & PLAN NOTE
25-year-old female status post laparoscopic cholecystectomy on 9/12/2023, here for follow-up. Plan:  - The patient's pathology was reviewed, and understanding was acknowledged. - The patient may return to all normal activities at this time. - I reiterated the lifting restriction of 20 lb until 4 weeks postoperatively, and advised that there are no restrictions from a dietary perspective   - The patient may return to clinic as needed, and can call with any questions should they arise.

## 2023-10-28 NOTE — PROGRESS NOTES
Assessment/Plan:    Biliary colic  [id number] female status post laparoscopic cholecystectomy on 9/12/2023, here for follow-up. Plan:  - The patient's pathology was reviewed, and understanding was acknowledged. - The patient may return to all normal activities at this time. - I reiterated the lifting restriction of 20 lb until 4 weeks postoperatively, and advised that there are no restrictions from a dietary perspective   - The patient may return to clinic as needed, and can call with any questions should they arise. Diagnoses and all orders for this visit:    Biliary colic          Subjective:      Patient ID: Rosalba Santacruz is a 81 y.o. female. Rosalba Santacruz is a 81 y.o. female who presents s/p laparoscopic cholecystectomy on 9/12/2023 for follow-up. Overall, they are doing quite well without significant complaints. They deny any significant pain, fevers, chills, chest pain, or shortness of breath. They are tolerating regular diet, and moving their bowels normally. They have been doing most activities around the house, without restriction or limitation, while abiding by their lifting restrictions. Surgical pathology was consistent with cholecystitis with cholelithiasis. The following portions of the patient's history were reviewed and updated as appropriate: She  has a past medical history of Anterior tibialis tendonitis of left leg, Cervical cancer ([address]), COVID (10/25/2021), COVID-19 virus infection (10/20/2021), Shingles, and Stress fracture of right tibia (04/2022).   She   Patient Active Problem List    Diagnosis Date Noted    Biliary colic 11/73/2289    Arthritis of right knee 04/14/2023    Anterior tibialis tendonitis of left leg     Mild persistent asthma without complication 83/64/9725    Allergic rhinitis due to animal (cat) (dog) hair and dander 12/16/2019    Intrinsic atopic dermatitis 12/16/2019    Shellfish allergy 12/16/2019    Laryngopharyngeal reflux 12/16/2019    Obesity with body mass index 30 or greater 09/25/2019    Anxiety 03/05/2019    Chronic seasonal allergic rhinitis due to pollen 07/06/2018    Anxiety and depression 07/20/2012    Benign essential hypertension 06/25/2012    Dyslipidemia 06/25/2012    History of cervical cancer 08/25/2011     She  has a past surgical history that includes Hallux valgus correction (Right); Total abdominal hysterectomy (1987); Bladder surgery; pr laparoscopy surg cholecystectomy (N/A, 09/12/2023); and Cholecystectomy (09/12/2023). Her family history includes Diabetes in her brother and son; Heart disease in her brother and father; No Known Problems in her daughter, daughter, and mother. She  reports that she has never smoked. She has never used smokeless tobacco. She reports current alcohol use of about 1.0 standard drink of alcohol per week. She reports that she does not use drugs.   Current Outpatient Medications   Medication Sig Dispense Refill    albuterol (Ventolin HFA) 90 mcg/act inhaler Inhale 2 puffs every 6 (six) hours as needed for wheezing 18 g 3    ALBUTEROL IN Inhale      ALPRAZolam (XANAX) 0.25 mg tablet Take 1 tablet (0.25 mg total) by mouth daily as needed for anxiety 10 tablet 0    amLODIPine-benazepril (LOTREL 5-10) 5-10 MG per capsule TAKE ONE CAPSULE BY MOUTH EVERY DAY 90 capsule 0    ascorbic acid (VITAMIN C) 1000 MG tablet       buPROPion (WELLBUTRIN SR) 150 mg 12 hr tablet Take 1 tablet (150 mg total) by mouth 2 (two) times a day 180 tablet 0    cetirizine (ZyrTEC) 10 mg tablet Take 10 mg by mouth daily      Cholecalciferol (VITAMIN D3) 1000 units CAPS Daily      EPINEPHrine (EPIPEN) 0.3 mg/0.3 mL SOAJ Inject 0.3 mL (0.3 mg total) into a muscle once for 1 dose 0.6 mL 0    mometasone (NASONEX) 50 mcg/act nasal spray 2 sprays into each nostril daily 17 g 11    Probiotic Product (Acidophilus) CHEW       zafirlukast (ACCOLATE) 20 MG tablet Take 1 tablet (20 mg total) by mouth 2 (two) times a day 180 tablet 3     No current facility-administered medications for this visit. Current Outpatient Medications on File Prior to Visit   Medication Sig    albuterol (Ventolin HFA) 90 mcg/act inhaler Inhale 2 puffs every 6 (six) hours as needed for wheezing    ALBUTEROL IN Inhale    ALPRAZolam (XANAX) 0.25 mg tablet Take 1 tablet (0.25 mg total) by mouth daily as needed for anxiety    amLODIPine-benazepril (LOTREL 5-10) 5-10 MG per capsule TAKE ONE CAPSULE BY MOUTH EVERY DAY    ascorbic acid (VITAMIN C) 1000 MG tablet     buPROPion (WELLBUTRIN SR) 150 mg 12 hr tablet Take 1 tablet (150 mg total) by mouth 2 (two) times a day    cetirizine (ZyrTEC) 10 mg tablet Take 10 mg by mouth daily    Cholecalciferol (VITAMIN D3) 1000 units CAPS Daily    EPINEPHrine (EPIPEN) 0.3 mg/0.3 mL SOAJ Inject 0.3 mL (0.3 mg total) into a muscle once for 1 dose    mometasone (NASONEX) 50 mcg/act nasal spray 2 sprays into each nostril daily    Probiotic Product (Acidophilus) CHEW     zafirlukast (ACCOLATE) 20 MG tablet Take 1 tablet (20 mg total) by mouth 2 (two) times a day     No current facility-administered medications on file prior to visit. She is allergic to codeine, shellfish-derived products - food allergy, and iodine - food allergy. .    Review of Systems   Constitutional:  Negative for chills, fatigue and fever. HENT:  Negative for ear pain, facial swelling, sinus pressure and sinus pain. Eyes:  Negative for pain. Respiratory:  Negative for cough, shortness of breath and wheezing. Cardiovascular:  Negative for chest pain. Gastrointestinal:  Negative for abdominal pain, constipation, diarrhea, nausea and vomiting. Endocrine: Negative for cold intolerance and heat intolerance. Genitourinary:  Negative for dysuria and flank pain. Musculoskeletal:  Negative for back pain and neck pain. Skin:  Negative for wound. Neurological:  Negative for syncope, facial asymmetry, light-headedness and numbness.    Psychiatric/Behavioral:  Negative for behavioral problems, confusion and suicidal ideas. Objective: There were no vitals taken for this visit. Physical Exam  Vitals and nursing note reviewed. Constitutional:       General: She is not in acute distress. Appearance: Normal appearance. She is not toxic-appearing. HENT:      Head: Normocephalic and atraumatic. Mouth/Throat:      Mouth: Mucous membranes are moist.   Eyes:      Extraocular Movements: Extraocular movements intact. Pupils: Pupils are equal, round, and reactive to light. Cardiovascular:      Rate and Rhythm: Normal rate and regular rhythm. Pulses: Normal pulses. Pulmonary:      Effort: Pulmonary effort is normal. No respiratory distress. Breath sounds: Normal breath sounds. No wheezing. Abdominal:      General: There is no distension. Palpations: Abdomen is soft. There is no mass. Tenderness: There is no abdominal tenderness. There is no guarding or rebound. Hernia: No hernia is present. Comments: Well-healed laparoscopic port sites, no evidence of hernia. Musculoskeletal:         General: No swelling or deformity. Normal range of motion. Cervical back: Normal range of motion and neck supple. Right lower leg: No edema. Left lower leg: No edema. Skin:     General: Skin is warm and dry. Coloration: Skin is not jaundiced. Neurological:      General: No focal deficit present. Mental Status: She is alert and oriented to person, place, and time.    Psychiatric:         Mood and Affect: Mood normal.         Behavior: Behavior normal.

## 2023-11-29 DIAGNOSIS — F41.9 ANXIETY AND DEPRESSION: ICD-10-CM

## 2023-11-29 DIAGNOSIS — F32.A ANXIETY AND DEPRESSION: ICD-10-CM

## 2023-11-29 RX ORDER — BUPROPION HYDROCHLORIDE 150 MG/1
150 TABLET, EXTENDED RELEASE ORAL 2 TIMES DAILY
Qty: 180 TABLET | Refills: 0 | Status: SHIPPED | OUTPATIENT
Start: 2023-11-29

## 2023-12-12 ENCOUNTER — OFFICE VISIT (OUTPATIENT)
Dept: INTERNAL MEDICINE CLINIC | Facility: CLINIC | Age: 83
End: 2023-12-12
Payer: COMMERCIAL

## 2023-12-12 VITALS
HEART RATE: 80 BPM | WEIGHT: 177 LBS | TEMPERATURE: 97.6 F | HEIGHT: 61 IN | SYSTOLIC BLOOD PRESSURE: 124 MMHG | OXYGEN SATURATION: 99 % | DIASTOLIC BLOOD PRESSURE: 78 MMHG | BODY MASS INDEX: 33.42 KG/M2

## 2023-12-12 DIAGNOSIS — H61.21 RIGHT EAR IMPACTED CERUMEN: Primary | ICD-10-CM

## 2023-12-12 PROCEDURE — 69210 REMOVE IMPACTED EAR WAX UNI: CPT | Performed by: NURSE PRACTITIONER

## 2023-12-12 PROCEDURE — 99213 OFFICE O/P EST LOW 20 MIN: CPT | Performed by: NURSE PRACTITIONER

## 2023-12-12 NOTE — PROGRESS NOTES
Name: Fernando Ellis      : 1940      MRN: [id number]  Encounter Provider: PO Garber  Encounter Date: 2023   Encounter department: [address] [facility]     1. Right ear impacted cerumen         Ear cerumen removal    Date/Time: 2023 12:00 PM    Performed by: PO Garber  Authorized by: PO Garber  Universal Protocol:  Procedure performed by: (halina REECE)  Consent: Verbal consent obtained. Consent given by: patient  Patient understanding: patient states understanding of the procedure being performed  Patient identity confirmed: verbally with patient    Patient location:  Clinic  Procedure details:     Location:  R ear    Procedure type: curette    Post-procedure details:     Complication:  None    Hearing quality:  Improved    Patient tolerance of procedure: Tolerated well, no immediate complications     Subjective      Elualia Salcedo is here today with complaints of right ear pain and feeling muffled. Symptoms come and go for a few weeks. No associated cold symptoms. Review of Systems   Constitutional:  Negative for activity change, appetite change and fever. HENT:  Positive for ear pain and hearing loss. Negative for congestion, facial swelling and sore throat. Respiratory:  Negative for cough. Neurological:  Negative for dizziness, light-headedness and headaches.        Current Outpatient Medications on File Prior to Visit   Medication Sig    albuterol (Ventolin HFA) 90 mcg/act inhaler Inhale 2 puffs every 6 (six) hours as needed for wheezing    ALBUTEROL IN Inhale    ALPRAZolam (XANAX) 0.25 mg tablet Take 1 tablet (0.25 mg total) by mouth daily as needed for anxiety    amLODIPine-benazepril (LOTREL 5-10) 5-10 MG per capsule TAKE ONE CAPSULE BY MOUTH EVERY DAY    ascorbic acid (VITAMIN C) 1000 MG tablet     buPROPion (WELLBUTRIN SR) 150 mg 12 hr tablet TAKE ONE TABLET BY MOUTH TWICE A DAY    cetirizine (ZyrTEC) 10 mg tablet Take 10 mg by mouth daily    Cholecalciferol (VITAMIN D3) 1000 units CAPS Daily    EPINEPHrine (EPIPEN) 0.3 mg/0.3 mL SOAJ Inject 0.3 mL (0.3 mg total) into a muscle once for 1 dose    mometasone (NASONEX) 50 mcg/act nasal spray 2 sprays into each nostril daily    Probiotic Product (Acidophilus) CHEW     zafirlukast (ACCOLATE) 20 MG tablet Take 1 tablet (20 mg total) by mouth 2 (two) times a day       Objective     /78   Pulse 80   Temp 97.6 °F (36.4 °C)   Ht 5' 1" (1.549 m)   Wt 80.3 kg (177 lb)   SpO2 99%   BMI 33.44 kg/m²     Physical Exam  Vitals reviewed. Constitutional:       Appearance: Normal appearance. HENT:      Head: Normocephalic and atraumatic. Right Ear: There is impacted cerumen. Left Ear: Tympanic membrane, ear canal and external ear normal.   Eyes:      Conjunctiva/sclera: Conjunctivae normal.   Pulmonary:      Effort: Pulmonary effort is normal.   Neurological:      Mental Status: She is alert and oriented to person, place, and time.    Psychiatric:         Mood and Affect: Mood normal.         Behavior: Behavior normal.       PO Wagoner

## 2023-12-17 DIAGNOSIS — I10 BENIGN HYPERTENSION: ICD-10-CM

## 2023-12-18 RX ORDER — AMLODIPINE BESYLATE AND BENAZEPRIL HYDROCHLORIDE 5; 10 MG/1; MG/1
CAPSULE ORAL
Qty: 90 CAPSULE | Refills: 0 | Status: SHIPPED | OUTPATIENT
Start: 2023-12-18

## 2023-12-28 ENCOUNTER — OFFICE VISIT (OUTPATIENT)
Dept: CARDIOLOGY CLINIC | Facility: CLINIC | Age: 83
End: 2023-12-28
Payer: COMMERCIAL

## 2023-12-28 VITALS
DIASTOLIC BLOOD PRESSURE: 68 MMHG | WEIGHT: 180.3 LBS | HEART RATE: 88 BPM | HEIGHT: 62 IN | BODY MASS INDEX: 33.18 KG/M2 | SYSTOLIC BLOOD PRESSURE: 126 MMHG

## 2023-12-28 DIAGNOSIS — E78.5 DYSLIPIDEMIA: ICD-10-CM

## 2023-12-28 DIAGNOSIS — I10 BENIGN ESSENTIAL HYPERTENSION: Primary | ICD-10-CM

## 2023-12-28 DIAGNOSIS — E66.9 OBESITY WITH BODY MASS INDEX 30 OR GREATER: ICD-10-CM

## 2023-12-28 PROCEDURE — 99214 OFFICE O/P EST MOD 30 MIN: CPT | Performed by: INTERNAL MEDICINE

## 2023-12-28 NOTE — PROGRESS NOTES
Cardiology Follow Up    Margaret Lutz  1940  [id number]  [facility]  [address]  [address]  [place] [address] [phone]    1. Benign essential hypertension        2. Dyslipidemia        3. Obesity with body mass index 30 or greater              Discussion/Summary:  Ms. Lutz is a pleasant 83-year-old female who presents to the office today for routine follow-up.  Since her last visit she has been feeling well.  She offers no cardiac complaints.    Her blood pressure is adequately controlled in the office today.  No changes were made to her antihypertensive medication regimen.  A low-salt diet was reinforced.    Her most recent lipids were reviewed.  Her LDL is suboptimal.  She declines initiation of medication.    She underwent an echocardiogram in 2019 which was unremarkable.    Given she is asymptomatic no testing is advised.    I will see her back in the office in one year sooner if deemed necessary.    Interval History:   Ms. Lutz is a pleasant 83-year-old female who presents to the office today for routine follow-up.      She reports in the fall she did undergo a cholecystectomy for biliary colic.    Since her last visit she feels well.  She continues to do exercises provided at physical therapy at home.  She can ascend steps carrying objects without any difficulty.  She denies any chest pain or shortness of breath.  She denies any signs or symptoms of congestive heart failure including lower extremity edema, paroxysmal nocturnal dyspnea, orthopnea, acute weight gain or increasing abdominal girth.  She denies lightheadedness, syncope or presyncope.  She denies palpitations.  She denies symptoms of claudication.      Medical Problems                 Problem List       Asthma    Benign hypertension    Anxiety and depression    Symptomatic menopausal or female climacteric states    Hyperlipidemia    History of  cervical cancer    Lower back pain    Chronic seasonal allergic rhinitis due to pollen    Mild persistent asthma without complication    Allergic conjunctivitis of both eyes    Allergic rhinitis due to animal (cat) (dog) hair and dander    Intrinsic atopic dermatitis    Shellfish allergy    Laryngopharyngeal reflux    Anxiety    Obesity with body mass index 30 or greater    Pure hypercholesterolemia    Anterior tibialis tendonitis of left leg    COVID-19 virus infection                  Past Medical History:   Diagnosis Date    Anterior tibialis tendonitis of left leg     Cervical cancer (HCC)     COVID 10/25/2021    COVID-19 virus infection 10/20/2021    Shingles     Stress fracture of right tibia 04/2022    tibial plateau     Social History     Socioeconomic History    Marital status:      Spouse name: Not on file    Number of children: 3    Years of education: Not on file    Highest education level: Not on file   Occupational History    Occupation: Retired     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   Tobacco Use    Smoking status: Never    Smokeless tobacco: Never   Vaping Use    Vaping status: Never Used   Substance and Sexual Activity    Alcohol use: Yes     Alcohol/week: 1.0 standard drink of alcohol     Types: 1 Glasses of wine per week     Comment: socially    Drug use: Never    Sexual activity: Not Currently   Other Topics Concern    Not on file   Social History Narrative    Single    Retired, advertisement                Marital: .Lives With: Alone.Home Environment: [facility], Uses air , Has a window air conditioner, The basement is dry, Does not use a dehumidifier, The floors are wood, Uses baseboard heating, Uses natural gas heating, [place] [place] is [place].Pets: None.Occupation: Retired, [employer].    Personal Habits:Cigarette Use: Never Smoked Cigarettes.     Social Determinants of Health     Financial Resource Strain: Low Risk  (10/3/2023)     Overall Financial Resource Strain (CARDIA)     Difficulty of Paying Living Expenses: Not very hard   Food Insecurity: Not on file   Transportation Needs: No Transportation Needs (10/3/2023)    PRAPARE - Transportation     Lack of Transportation (Medical): No     Lack of Transportation (Non-Medical): No   Physical Activity: Inactive (7/5/2022)    Exercise Vital Sign     Days of Exercise per Week: 0 days     Minutes of Exercise per Session: 0 min   Stress: Not on file   Social Connections: Not on file   Intimate Partner Violence: Not on file   Housing Stability: Not on file      Family History   Problem Relation Age of Onset    No Known Problems Mother     Heart disease Father     Heart disease Brother     Diabetes Brother     No Known Problems Daughter     No Known Problems Daughter     Diabetes Son     Alcohol abuse Neg Hx     Substance Abuse Neg Hx     Mental illness Neg Hx     Depression Neg Hx      Past Surgical History:   Procedure Laterality Date    BLADDER SURGERY      CHOLECYSTECTOMY  09/12/2023    HALLUX VALGUS CORRECTION Right     AR LAPAROSCOPY SURG CHOLECYSTECTOMY N/A 09/12/2023    Procedure: CHOLECYSTECTOMY LAPAROSCOPIC;  Surgeon: Albert Ramirez MD;  Location: [facility];  Service: General    TOTAL ABDOMINAL HYSTERECTOMY  1987    Cervical CA       Current Outpatient Medications:     albuterol (Ventolin HFA) 90 mcg/act inhaler, Inhale 2 puffs every 6 (six) hours as needed for wheezing, Disp: 18 g, Rfl: 3    ALBUTEROL IN, Inhale, Disp: , Rfl:     ALPRAZolam (XANAX) 0.25 mg tablet, Take 1 tablet (0.25 mg total) by mouth daily as needed for anxiety, Disp: 10 tablet, Rfl: 0    amLODIPine-benazepril (LOTREL 5-10) 5-10 MG per capsule, TAKE ONE CAPSULE BY MOUTH EVERY DAY, Disp: 90 capsule, Rfl: 0    ascorbic acid (VITAMIN C) 1000 MG tablet, , Disp: , Rfl:     buPROPion (WELLBUTRIN SR) 150 mg 12 hr tablet, TAKE ONE TABLET BY MOUTH TWICE A DAY, Disp: 180 tablet, Rfl: 0    cetirizine (ZyrTEC) 10 mg tablet, Take 10  "mg by mouth daily, Disp: , Rfl:     Cholecalciferol (VITAMIN D3) 1000 units CAPS, Daily, Disp: , Rfl:     EPINEPHrine (EPIPEN) 0.3 mg/0.3 mL SOAJ, Inject 0.3 mL (0.3 mg total) into a muscle once for 1 dose, Disp: 0.6 mL, Rfl: 0    mometasone (NASONEX) 50 mcg/act nasal spray, 2 sprays into each nostril daily, Disp: 17 g, Rfl: 11    Probiotic Product (Acidophilus) CHEW, , Disp: , Rfl:     zafirlukast (ACCOLATE) 20 MG tablet, Take 1 tablet (20 mg total) by mouth 2 (two) times a day, Disp: 180 tablet, Rfl: 3  Allergies   Allergen Reactions    Codeine Other (See Comments) and Tachycardia    Shellfish-Derived Products - Food Allergy Shortness Of Breath    Iodine - Food Allergy Other (See Comments)       Labs:     Chemistry        Component Value Date/Time    K 4.2 09/08/2023 0831     (H) 09/08/2023 0831    CO2 25 09/08/2023 0831    BUN 16 09/08/2023 0831    CREATININE 0.69 09/08/2023 0831        Component Value Date/Time    CALCIUM 8.8 09/08/2023 0831    ALKPHOS 69 09/08/2023 0831    AST 14 09/08/2023 0831    ALT 11 09/08/2023 0831            No results found for: \"CHOL\"  Lab Results   Component Value Date    HDL 80 04/27/2023    HDL 82 11/20/2021     Lab Results   Component Value Date    LDLCALC 132 (H) 04/27/2023    LDLCALC 142 (H) 11/20/2021     Lab Results   Component Value Date    TRIG 54 04/27/2023    TRIG 50 11/20/2021     No results found for: \"CHOLHDL\"    Imaging: No results found.    ECG:  Normal sinus rhythm first-degree AV block, left ventricular hypertrophy, nonspecific ST and T-wave abnormality      ROS    There were no vitals filed for this visit.    There were no vitals filed for this visit.        There is no height or weight on file to calculate BMI.    Physical Exam:  General appearance:  Appears stated age, alert, well appearing and in no distress  HEENT:  PERRLA, EOMI, no scleral icterus, no conjunctival pallor  NECK:  Supple, No elevated JVP, no thyromegaly, no carotid bruits  HEART:  Regular " rate and rhythm, normal S1/S2, no S3/S4, no murmur or rub  LUNGS:  Clear to auscultation bilaterally  ABDOMEN:  Soft, non-tender, positive bowel sounds, no rebound or guarding, no organomegaly   EXTREMITIES:  No edema  VASCULAR:  Normal pedal pulses   SKIN: No lesions or rashes on exposed skin  NEURO:  CN II-XII intact, no focal deficits

## 2024-04-08 DIAGNOSIS — I10 BENIGN HYPERTENSION: ICD-10-CM

## 2024-04-08 RX ORDER — AMLODIPINE BESYLATE AND BENAZEPRIL HYDROCHLORIDE 5; 10 MG/1; MG/1
CAPSULE ORAL
Qty: 90 CAPSULE | Refills: 1 | Status: SHIPPED | OUTPATIENT
Start: 2024-04-08

## 2024-04-08 NOTE — TELEPHONE ENCOUNTER
Patient called to request a refill for their amLODIPine-benazepril  advised a refill was requested on 4/8/24 and is pending approval. Patient verbalized understanding and is in agreement.

## 2024-04-16 ENCOUNTER — OFFICE VISIT (OUTPATIENT)
Dept: INTERNAL MEDICINE CLINIC | Facility: CLINIC | Age: 84
End: 2024-04-16
Payer: COMMERCIAL

## 2024-04-16 VITALS
OXYGEN SATURATION: 97 % | DIASTOLIC BLOOD PRESSURE: 80 MMHG | TEMPERATURE: 99.6 F | HEIGHT: 62 IN | BODY MASS INDEX: 32.57 KG/M2 | HEART RATE: 87 BPM | SYSTOLIC BLOOD PRESSURE: 120 MMHG | WEIGHT: 177 LBS

## 2024-04-16 DIAGNOSIS — F41.9 ANXIETY AND DEPRESSION: ICD-10-CM

## 2024-04-16 DIAGNOSIS — I10 BENIGN ESSENTIAL HYPERTENSION: Primary | ICD-10-CM

## 2024-04-16 DIAGNOSIS — J45.30 MILD PERSISTENT ASTHMA WITHOUT COMPLICATION: ICD-10-CM

## 2024-04-16 DIAGNOSIS — F32.A ANXIETY AND DEPRESSION: ICD-10-CM

## 2024-04-16 DIAGNOSIS — R05.9 COUGH, UNSPECIFIED TYPE: ICD-10-CM

## 2024-04-16 DIAGNOSIS — E78.5 DYSLIPIDEMIA: ICD-10-CM

## 2024-04-16 LAB
SARS-COV-2 AG UPPER RESP QL IA: NEGATIVE
VALID CONTROL: NORMAL

## 2024-04-16 PROCEDURE — 99214 OFFICE O/P EST MOD 30 MIN: CPT | Performed by: NURSE PRACTITIONER

## 2024-04-16 PROCEDURE — G2211 COMPLEX E/M VISIT ADD ON: HCPCS | Performed by: NURSE PRACTITIONER

## 2024-04-16 PROCEDURE — 87811 SARS-COV-2 COVID19 W/OPTIC: CPT | Performed by: NURSE PRACTITIONER

## 2024-04-16 RX ORDER — BUPROPION HYDROCHLORIDE 150 MG/1
150 TABLET, EXTENDED RELEASE ORAL 2 TIMES DAILY
Qty: 180 TABLET | Refills: 1 | Status: SHIPPED | OUTPATIENT
Start: 2024-04-16

## 2024-04-16 NOTE — PROGRESS NOTES
Name: Margaret Lutz      : 1940      MRN: [id number]  Encounter Provider: PO Mulligan  Encounter Date: 2024   Encounter department: [facility] INTERNAL MEDICINE    Assessment & Plan     1. Benign essential hypertension  Assessment & Plan:  Stable  Continue amlodipine-benazepril       2. Mild persistent asthma without complication  Assessment & Plan:  Stable  Sees an allergist   On accolate twice daily and albuterol as needed      3. Anxiety and depression  Assessment & Plan:  Stable  On wellbutrin     Orders:  -     buPROPion (WELLBUTRIN SR) 150 mg 12 hr tablet; Take 1 tablet (150 mg total) by mouth 2 (two) times a day    4. Dyslipidemia  Assessment & Plan:  Continue a low fat diet. Sees cardiology.       5. Cough, unspecified type  -     POCT Rapid Covid Ag           Subjective      Margaret is here today for follow up  She is doing well    She has been doing intermittent fasting, 15 hours.     No recent breathing issues, allergies are stable. Recently saw the allergist, no medication changes.   She does have a low grade temp in the office today. She does have a cough but she states it is not worse than her typical allergy related cough.             Review of Systems   Constitutional:  Negative for activity change, appetite change, fatigue and unexpected weight change.   HENT:  Positive for postnasal drip. Negative for congestion and sore throat.    Eyes:  Negative for visual disturbance.   Respiratory:  Positive for cough. Negative for shortness of breath.    Cardiovascular:  Negative for chest pain, palpitations and leg swelling.   Gastrointestinal:  Negative for abdominal pain, blood in stool, constipation and diarrhea.   Genitourinary:  Negative for difficulty urinating.   Musculoskeletal:  Negative for arthralgias.   Neurological:  Negative for dizziness, light-headedness and headaches.   Psychiatric/Behavioral:  Negative for sleep disturbance.        Current Outpatient  "Medications on File Prior to Visit   Medication Sig    albuterol (Ventolin HFA) 90 mcg/act inhaler Inhale 2 puffs every 6 (six) hours as needed for wheezing    ALBUTEROL IN Inhale (Patient not taking: Reported on 12/28/2023)    ALPRAZolam (XANAX) 0.25 mg tablet Take 1 tablet (0.25 mg total) by mouth daily as needed for anxiety    amLODIPine-benazepril (LOTREL 5-10) 5-10 MG per capsule TAKE ONE CAPSULE BY MOUTH EVERY DAY    ascorbic acid (VITAMIN C) 1000 MG tablet     cetirizine (ZyrTEC) 10 mg tablet Take 10 mg by mouth daily (Patient not taking: Reported on 12/28/2023)    Cholecalciferol (VITAMIN D3) 1000 units CAPS Daily    diphenhydrAMINE (BENADRYL) 25 mg capsule Take 25 mg by mouth every 6 (six) hours as needed for itching    EPINEPHrine (EPIPEN) 0.3 mg/0.3 mL SOAJ Inject 0.3 mL (0.3 mg total) into a muscle once for 1 dose    mometasone (NASONEX) 50 mcg/act nasal spray 2 sprays into each nostril daily    Probiotic Product (Acidophilus) CHEW     zafirlukast (ACCOLATE) 20 MG tablet Take 1 tablet (20 mg total) by mouth 2 (two) times a day    [DISCONTINUED] buPROPion (WELLBUTRIN SR) 150 mg 12 hr tablet TAKE ONE TABLET BY MOUTH TWICE A DAY       Objective     /80   Pulse 87   Temp 99.6 °F (37.6 °C)   Ht 5' 2.4\" (1.585 m)   Wt 80.3 kg (177 lb)   SpO2 97%   BMI 31.96 kg/m²     Physical Exam  Vitals reviewed.   Constitutional:       Appearance: Normal appearance. She is well-developed.   HENT:      Head: Normocephalic and atraumatic.   Eyes:      Conjunctiva/sclera: Conjunctivae normal.   Neck:      Thyroid: No thyromegaly.   Cardiovascular:      Rate and Rhythm: Normal rate and regular rhythm.      Heart sounds: Normal heart sounds.   Pulmonary:      Effort: Pulmonary effort is normal.      Breath sounds: Normal breath sounds.   Abdominal:      General: Bowel sounds are normal.      Palpations: Abdomen is soft.   Musculoskeletal:         General: Normal range of motion.      Right lower leg: No edema.      " Left lower leg: No edema.   Skin:     General: Skin is warm and dry.   Neurological:      Mental Status: She is alert and oriented to person, place, and time.   Psychiatric:         Mood and Affect: Mood normal.         Behavior: Behavior normal.       PO Mulligan

## 2024-06-20 ENCOUNTER — EVALUATION (OUTPATIENT)
Dept: PHYSICAL THERAPY | Facility: OTHER | Age: 84
End: 2024-06-20
Payer: COMMERCIAL

## 2024-06-20 DIAGNOSIS — S52.121D CLOSED DISPLACED FRACTURE OF HEAD OF RIGHT RADIUS WITH ROUTINE HEALING, SUBSEQUENT ENCOUNTER: Primary | ICD-10-CM

## 2024-06-20 PROCEDURE — 97161 PT EVAL LOW COMPLEX 20 MIN: CPT | Performed by: PHYSICAL THERAPIST

## 2024-06-20 PROCEDURE — 97110 THERAPEUTIC EXERCISES: CPT | Performed by: PHYSICAL THERAPIST

## 2024-06-20 NOTE — LETTER
2024    Miles Blackwood PA-C  [address] [facility] [address]    Patient: Margaret Lutz   YOB: 1940   Date of Visit: 2024     Encounter Diagnosis     ICD-10-CM    1. Closed displaced fracture of head of right radius with routine healing, subsequent encounter  S52.121D           Dear Dr. Blackwood:    Thank you for your recent referral of Margaret Lutz. Please review the attached evaluation summary from Margaret's recent visit.     Please verify that you agree with the plan of care by signing the attached order.     If you have any questions or concerns, please do not hesitate to call.     I sincerely appreciate the opportunity to share in the care of one of your patients and hope to have another opportunity to work with you in the near future.       Sincerely,    Dannie Tavares, PT      Referring Provider:      I certify that I have read the below Plan of Care and certify the need for these services furnished under this plan of treatment while under my care.                    Miles Blackwood PA-C  [address] [facility] [address]  Via Fax: [phone]          PT Evaluation     Today's date: 2024  Patient name: Margaret Lutz  : 1940  MRN: [id number]  Referring provider: Miles Blackwood PA-C  Dx: No diagnosis found.               Assessment  Impairments: abnormal coordination, abnormal muscle firing, abnormal or restricted ROM, activity intolerance, impaired physical strength, lacks appropriate home exercise program, pain with function and poor body mechanics  Symptom irritability: moderate    Assessment details: Margaret Lutz is a pleasant 84 y.o. female who presents with L readial hed fractreu from a fall 23. She is doing well presents today in the sling. Tolerated AAROM well. She also has L foot frature using a single cane. She contines to have some pan at rest she has not been any light IADL's requires assiannce with some activity.   HEP  issued and POC reviewed.     Understanding of Dx/Px/POC: good     Prognosis: good    Goals  Short Term:  Pt will report decreased levels of pain by at least 2 subjective ratings in 4 weeks  Pt will demonstrate improved ROM by at least 10 degrees in 4 weeks  Pt will demonstrate improved strength by 1/2 grade  Long Term:   Pt will be independent in their HEP in 8 weeks  Pt will be be pain free with IADL's  Pt will demonstrate improved FOTO, > 80         Plan  Patient would benefit from: skilled physical therapy  Planned modality interventions: thermotherapy: hydrocollator packs    Planned therapy interventions: activity modification, joint mobilization, manual therapy, motor coordination training, neuromuscular re-education, patient education, self care, therapeutic activities, therapeutic exercise, graded activity and home exercise program    Frequency: 2x week  Treatment plan discussed with: patient  Plan details: Prognosis above is given PT services 2x/week tapering to 1x/week over the next 3 months and home program adherence.      Subjective Evaluation    Patient Goals  Patient goals for therapy: decreased pain, independence with ADLs/IADLs, return to sport/leisure activities, increased motion and increased strength    Pain  At best pain ratin  At worst pain ratin      Objective     General Comments:      Elbow Comments   Mild pain with full ext and flex .     Pronation/supination  limited by 20 degress painfull.     Wrist mild tightness     Some tTP over the dailia head no crepitius noted with PROM            Precautions: NA       Manuals             PROM elbow for                                                    Neuro Re-Ed                                                                                                        Ther Ex                                                                                                                     Ther Activity                                       Gait  Training                                       Modalities

## 2024-06-20 NOTE — PROGRESS NOTES
PT Evaluation     Today's date: 2024  Patient name: Margaret Lutz  : 1940  MRN: [id number]  Referring provider: Miles Blackwood PA-C  Dx: No diagnosis found.               Assessment  Impairments: abnormal coordination, abnormal muscle firing, abnormal or restricted ROM, activity intolerance, impaired physical strength, lacks appropriate home exercise program, pain with function and poor body mechanics  Symptom irritability: moderate    Assessment details: Margaret Lutz is a pleasant 84 y.o. female who presents with L readial hed fractreu from a fall 23. She is doing well presents today in the sling. Tolerated AAROM well. She also has L foot frature using a single cane. She contines to have some pan at rest she has not been any light IADL's requires assiannce with some activity.   HEP issued and POC reviewed.     Understanding of Dx/Px/POC: good     Prognosis: good    Goals  Short Term:  Pt will report decreased levels of pain by at least 2 subjective ratings in 4 weeks  Pt will demonstrate improved ROM by at least 10 degrees in 4 weeks  Pt will demonstrate improved strength by 1/2 grade  Long Term:   Pt will be independent in their HEP in 8 weeks  Pt will be be pain free with IADL's  Pt will demonstrate improved FOTO, > 80         Plan  Patient would benefit from: skilled physical therapy  Planned modality interventions: thermotherapy: hydrocollator packs    Planned therapy interventions: activity modification, joint mobilization, manual therapy, motor coordination training, neuromuscular re-education, patient education, self care, therapeutic activities, therapeutic exercise, graded activity and home exercise program    Frequency: 2x week  Treatment plan discussed with: patient  Plan details: Prognosis above is given PT services 2x/week tapering to 1x/week over the next 3 months and home program adherence.      Subjective Evaluation    Patient Goals  Patient goals for therapy: decreased pain,  independence with ADLs/IADLs, return to sport/leisure activities, increased motion and increased strength    Pain  At best pain ratin  At worst pain ratin      Objective     General Comments:      Elbow Comments   Mild pain with full ext and flex .     Pronation/supination  limited by 20 degress painfull.     Wrist mild tightness     Some tTP over the dailia head no crepitius noted with PROM            Precautions: NA       Manuals             PROM elbow for                                                    Neuro Re-Ed                                                                                                        Ther Ex                                                                                                                     Ther Activity                                       Gait Training                                       Modalities

## 2024-06-27 ENCOUNTER — OFFICE VISIT (OUTPATIENT)
Dept: PHYSICAL THERAPY | Facility: OTHER | Age: 84
End: 2024-06-27
Payer: COMMERCIAL

## 2024-06-27 DIAGNOSIS — S52.121D CLOSED DISPLACED FRACTURE OF HEAD OF RIGHT RADIUS WITH ROUTINE HEALING, SUBSEQUENT ENCOUNTER: Primary | ICD-10-CM

## 2024-06-27 PROCEDURE — 97140 MANUAL THERAPY 1/> REGIONS: CPT | Performed by: PHYSICAL THERAPIST

## 2024-06-27 PROCEDURE — 97112 NEUROMUSCULAR REEDUCATION: CPT | Performed by: PHYSICAL THERAPIST

## 2024-06-27 NOTE — PROGRESS NOTES
Daily Note     Today's date: 2024  Patient name: Margaret Lutz  : 1940  MRN: [id number]  Referring provider: Miles Blackwood PA-C  Dx:   Encounter Diagnosis     ICD-10-CM    1. Closed displaced fracture of head of right radius with routine healing, subsequent encounter  S52.121D                      Subjective: discussed gradual D/C of the sling paitnet is reluctant to do this.       Objective: See treatment diary below      Assessment: Tolerated treatment well. Patient demonstrated fatigue post treatment      Plan: Continue per plan of care.      Precautions: NA       Manuals 6.27            PROM elbow and wrist  Flex/ext and pro/supination MJ                                                    Neuro Re-Ed             Nathan slide on wall  10x2             Digi flex  Yellow 10x3              Towel wringing  10x2             Wrisr AROM  30            Opposition  30                                       Ther Ex             Wrsit flex and ext  5''x10                                                                                                        Ther Activity                                       Gait Training                                       Modalities

## 2024-07-02 ENCOUNTER — OFFICE VISIT (OUTPATIENT)
Dept: PHYSICAL THERAPY | Facility: OTHER | Age: 84
End: 2024-07-02
Payer: COMMERCIAL

## 2024-07-02 DIAGNOSIS — S52.121D CLOSED DISPLACED FRACTURE OF HEAD OF RIGHT RADIUS WITH ROUTINE HEALING, SUBSEQUENT ENCOUNTER: Primary | ICD-10-CM

## 2024-07-02 PROCEDURE — 97140 MANUAL THERAPY 1/> REGIONS: CPT | Performed by: PHYSICAL THERAPIST

## 2024-07-02 PROCEDURE — 97112 NEUROMUSCULAR REEDUCATION: CPT | Performed by: PHYSICAL THERAPIST

## 2024-07-02 NOTE — PROGRESS NOTES
Daily Note     Today's date: 2024  Patient name: Margaret Lutz  : 1940  MRN: [id number]  Referring provider: Miles Blackwood PA-C  Dx:   Encounter Diagnosis     ICD-10-CM    1. Closed displaced fracture of head of right radius with routine healing, subsequent encounter  S52.121D                      Subjective: she is gradually doing more around the house.  Trials some light Weight shifts today. We discusse starting some light strengthening the week of 7/15       Objective: See treatment diary below      Assessment: Tolerated treatment well. Patient demonstrated fatigue post treatment      Plan: Continue per plan of care.      Precautions: start light strengthening 7/15       Manuals 6.27 7.2.24           PROM elbow and wrist  Flex/ext and pro/supination MJ  MJ  flex /ext and pro/supination                                                   Neuro Re-Ed             Nathan slide on wall  10x2  10x2            Digi flex  Yellow 10x3   Yellow 10x3            Towel wringing  10x2  10x2            Wrist AROM  30 30            Opposition  30  20           AROM Flex   10x2            Weight shift edge of table   10x2            Ther Ex             Wrsit flex and ext  5''x10  5''x10                                                                                                       Ther Activity                                       Gait Training                                       Modalities

## 2024-07-03 ENCOUNTER — APPOINTMENT (OUTPATIENT)
Dept: PHYSICAL THERAPY | Facility: OTHER | Age: 84
End: 2024-07-03
Payer: COMMERCIAL

## 2024-07-08 DIAGNOSIS — F41.9 ANXIETY AND DEPRESSION: ICD-10-CM

## 2024-07-08 DIAGNOSIS — F32.A ANXIETY AND DEPRESSION: ICD-10-CM

## 2024-07-08 RX ORDER — BUPROPION HYDROCHLORIDE 150 MG/1
150 TABLET, EXTENDED RELEASE ORAL 2 TIMES DAILY
Qty: 180 TABLET | Refills: 0 | Status: SHIPPED | OUTPATIENT
Start: 2024-07-08

## 2024-07-11 ENCOUNTER — OFFICE VISIT (OUTPATIENT)
Dept: PHYSICAL THERAPY | Facility: OTHER | Age: 84
End: 2024-07-11
Payer: COMMERCIAL

## 2024-07-11 DIAGNOSIS — S52.122D CLOSED DISPLACED FRACTURE OF HEAD OF LEFT RADIUS WITH ROUTINE HEALING, SUBSEQUENT ENCOUNTER: Primary | ICD-10-CM

## 2024-07-11 PROCEDURE — 97112 NEUROMUSCULAR REEDUCATION: CPT | Performed by: PHYSICAL MEDICINE & REHABILITATION

## 2024-07-11 PROCEDURE — 97140 MANUAL THERAPY 1/> REGIONS: CPT | Performed by: PHYSICAL MEDICINE & REHABILITATION

## 2024-07-11 NOTE — PROGRESS NOTES
"Daily Note     Today's date: 2024  Patient name: Margaret Lutz  : 1940  MRN: [id number]  Referring provider: Miles Blackwood PA-C  Dx:   Encounter Diagnosis     ICD-10-CM    1. Closed displaced fracture of head of left radius with routine healing, subsequent encounter  S52.122D                  Subjective: Patient offers no new complaints to begin session.     Objective: See treatment diary below    Assessment: Tolerated treatment well. All treatment performed without pain increase. Patient demonstrated fatigue post treatment. Begin light strengthening nv.     Plan: Continue per plan of care.      Precautions: start light strengthening 7/15     Manuals 6.27 7.2.24           PROM elbow and wrist  Flex/ext and pro/supination MJ  MJ  flex /ext and pro/supination  LH                                                 Neuro Re-Ed             Towel slide on wall  10x2  10x2  2x10          Digi flex  Yellow 10x3   Yellow 10x3  Yellow 3x10          Towel wringing  10x2  10x2  2x10          Wrist AROM  30 30  30x          Opposition  30  20 20x          AROM Flex   10x2  2x10          Weight shift edge of table   10x2  2x10          Ther Ex             Wrist flex and ext  5''x10  5''x10  10x5\"                                                                                                     Ther Activity                                       Gait Training                                       Modalities                                            "
Principal Discharge DX:	Substance use disorder

## 2024-07-18 ENCOUNTER — OFFICE VISIT (OUTPATIENT)
Dept: PHYSICAL THERAPY | Facility: OTHER | Age: 84
End: 2024-07-18
Payer: COMMERCIAL

## 2024-07-18 DIAGNOSIS — S52.122D CLOSED DISPLACED FRACTURE OF HEAD OF LEFT RADIUS WITH ROUTINE HEALING, SUBSEQUENT ENCOUNTER: Primary | ICD-10-CM

## 2024-07-18 DIAGNOSIS — S52.121D CLOSED DISPLACED FRACTURE OF HEAD OF RIGHT RADIUS WITH ROUTINE HEALING, SUBSEQUENT ENCOUNTER: ICD-10-CM

## 2024-07-18 PROCEDURE — 97112 NEUROMUSCULAR REEDUCATION: CPT | Performed by: PHYSICAL THERAPIST

## 2024-07-18 PROCEDURE — 97140 MANUAL THERAPY 1/> REGIONS: CPT | Performed by: PHYSICAL THERAPIST

## 2024-07-18 NOTE — PROGRESS NOTES
"Daily Note     Today's date: 2024  Patient name: Margaret Lutz  : 1940  MRN: [id number]  Referring provider: Miles Blackwood PA-C  Dx:   Encounter Diagnosis     ICD-10-CM    1. Closed displaced fracture of head of left radius with routine healing, subsequent encounter  S52.122D       2. Closed displaced fracture of head of right radius with routine healing, subsequent encounter  S52.121D           Start Time: 1025          Subjective: toelrated light strethgin well. No increased pain.       Objective: See treatment diary below      Assessment: Tolerated treatment well. Patient demonstrated fatigue post treatment      Plan: Continue per plan of care.      Precautions: start light strengthening 7/15     Manuals 6.27 7.2.24  7.18         PROM elbow and wrist  Flex/ext and pro/supination MJ  MJ  flex /ext and pro/supination  LH MJ                                                Neuro Re-Ed             Towel slide on wall  10x2  10x2  2x10 10x 2          Digi flex  Yellow 10x3   Yellow 10x3  Yellow 3x10 Thera web          Towel wringing  10x2  10x2  2x10 10x2          Wrist AROM  30 30  30x          Opposition  30  20 20x          TB row     [place] 10x2          AROM Flex elbow   10x2  2x10 #1 10x2          Weight shift edge of table   10x2  2x10 10x2         Wrist flex and ext with wieght     #1 10x2          Ther Ex             Wrist flex and ext  5''x10  5''x10  10x5\" 5'x10         Towel slide with the wall.     10x2                                                                                        Ther Activity                                       Gait Training                                       Modalities                                              "

## 2024-07-25 ENCOUNTER — OFFICE VISIT (OUTPATIENT)
Dept: PHYSICAL THERAPY | Facility: OTHER | Age: 84
End: 2024-07-25
Payer: COMMERCIAL

## 2024-07-25 DIAGNOSIS — S52.121D CLOSED DISPLACED FRACTURE OF HEAD OF RIGHT RADIUS WITH ROUTINE HEALING, SUBSEQUENT ENCOUNTER: ICD-10-CM

## 2024-07-25 DIAGNOSIS — S52.122D CLOSED DISPLACED FRACTURE OF HEAD OF LEFT RADIUS WITH ROUTINE HEALING, SUBSEQUENT ENCOUNTER: Primary | ICD-10-CM

## 2024-07-25 PROCEDURE — 97112 NEUROMUSCULAR REEDUCATION: CPT | Performed by: PHYSICAL THERAPIST

## 2024-07-25 PROCEDURE — 97110 THERAPEUTIC EXERCISES: CPT | Performed by: PHYSICAL THERAPIST

## 2024-07-25 PROCEDURE — 97140 MANUAL THERAPY 1/> REGIONS: CPT | Performed by: PHYSICAL THERAPIST

## 2024-07-25 NOTE — PROGRESS NOTES
"Daily Note     Today's date: 2024  Patient name: Margaret Lutz  : 1940  MRN: [id number]  Referring provider: Miles Blackwood PA-C  Dx:   Encounter Diagnosis     ICD-10-CM    1. Closed displaced fracture of head of left radius with routine healing, subsequent encounter  S52.122D       2. Closed displaced fracture of head of right radius with routine healing, subsequent encounter  S52.121D                      Subjective: doing very well tolerated progresion to strenghing with no pain. Able to do more IADL;s as well.       Objective: See treatment diary below      Assessment: Tolerated treatment well. Patient demonstrated fatigue post treatment      Plan: Continue per plan of care.      Precautions: start light strengthening 7/15     Manuals 6.27 7.2.24  7.18 7.25        PROM elbow and wrist  Flex/ext and pro/supination MJ  MJ  flex /ext and pro/supination  LH MJ MJ                                               Neuro Re-Ed             Towel slide on wall  10x2  10x2  2x10 10x 2  10x2         Digi flex  Yellow 10x3   Yellow 10x3  Yellow 3x10 Thera web  10x2 theraweb red 10x2         TB tricep     Otb 10x2         Towel wringing  10x2  10x2  2x10 10x2          Wrist AROM  30 30  30x  #2         Opposition  30  20 20x          TB row     [place]ach 10x2  Peach 10x2         AROM Flex elbow   10x2  2x10 #1 10x2  #2 10x2         Weight shift edge of table   10x2  2x10 10x2 10x2         Wrist flex and ext with wieght     #1 10x2  #2 10x2         Ther Ex             Wrist flex and ext  5''x10  5''x10  10x5\" 5'x10 5''x10         Towel slide with the wall.     10x2  10x2                                                                                       Ther Activity                                       Gait Training                                       Modalities                                                "

## 2024-08-01 ENCOUNTER — APPOINTMENT (OUTPATIENT)
Dept: PHYSICAL THERAPY | Facility: OTHER | Age: 84
End: 2024-08-01
Payer: COMMERCIAL

## 2024-08-08 ENCOUNTER — OFFICE VISIT (OUTPATIENT)
Dept: PHYSICAL THERAPY | Facility: OTHER | Age: 84
End: 2024-08-08
Payer: COMMERCIAL

## 2024-08-08 DIAGNOSIS — S52.122D CLOSED DISPLACED FRACTURE OF HEAD OF LEFT RADIUS WITH ROUTINE HEALING, SUBSEQUENT ENCOUNTER: Primary | ICD-10-CM

## 2024-08-08 PROCEDURE — 97140 MANUAL THERAPY 1/> REGIONS: CPT | Performed by: PHYSICAL THERAPIST

## 2024-08-08 PROCEDURE — 97112 NEUROMUSCULAR REEDUCATION: CPT | Performed by: PHYSICAL THERAPIST

## 2024-08-08 NOTE — PROGRESS NOTES
"Daily Note     Today's date: 2024  Patient name: Margaret Lutz  : 1940  MRN: [id number]  Referring provider: Miles Blackwood PA-C  Dx:   No diagnosis found.                 Subjective: dshe hsa resumed her normal IADL's no pain fully ROM. Strength is back to pre injury.     D/C from PT at this time she has met all goals set at his time       Objective: See treatment diary below      Assessment: Tolerated treatment well. Patient demonstrated fatigue post treatment      Plan: Continue per plan of care.      Precautions: start light strengthening 7/15     Manuals 6.27 7.2.24  7.18 8.8        PROM elbow and wrist  Flex/ext and pro/supination MJ  MJ  flex /ext and pro/supination  LH MJ MJ                                               Neuro Re-Ed             Towel slide on wall  10x2  10x2  2x10 10x 2  10x2         Digi flex  Yellow 10x3   Yellow 10x3  Yellow 3x10 Thera web  10x2 theraweb red 10x2         TB tricep     Otb 10x2         Towel wringing  10x2  10x2  2x10 10x2          Wrist AROM  30 30  30x  #2         Opposition  30  20 20x          TB row     Peach 10x2  Peach 10x2         AROM Flex elbow   10x2  2x10 #1 10x2  #2 10x2         Weight shift edge of table   10x2  2x10 10x2 10x2         Wrist flex and ext with wieght     #1 10x2  #2 10x2         Ther Ex             Wrist flex and ext  5''x10  5''x10  10x5\" 5'x10 5''x10         Towel slide with the wall.     10x2  10x2         Ball into wall     10x2 green                                                                          Ther Activity                                       Gait Training                                       Modalities                                                "

## 2024-10-04 DIAGNOSIS — Z78.0 POST-MENOPAUSAL: ICD-10-CM

## 2024-10-04 DIAGNOSIS — S52.125A NONDISPLACED FRACTURE OF HEAD OF LEFT RADIUS, INITIAL ENCOUNTER FOR CLOSED FRACTURE: Primary | ICD-10-CM

## 2024-10-08 ENCOUNTER — TELEPHONE (OUTPATIENT)
Age: 84
End: 2024-10-08

## 2024-10-08 ENCOUNTER — APPOINTMENT (OUTPATIENT)
Dept: LAB | Facility: CLINIC | Age: 84
End: 2024-10-08
Payer: COMMERCIAL

## 2024-10-08 DIAGNOSIS — E78.5 DYSLIPIDEMIA: ICD-10-CM

## 2024-10-08 DIAGNOSIS — I10 BENIGN ESSENTIAL HYPERTENSION: ICD-10-CM

## 2024-10-08 DIAGNOSIS — I10 BENIGN ESSENTIAL HYPERTENSION: Primary | ICD-10-CM

## 2024-10-08 LAB
ALBUMIN SERPL BCG-MCNC: 4.1 G/DL (ref 3.5–5)
ALP SERPL-CCNC: 67 U/L (ref 34–104)
ALT SERPL W P-5'-P-CCNC: 12 U/L (ref 7–52)
ANION GAP SERPL CALCULATED.3IONS-SCNC: 9 MMOL/L (ref 4–13)
AST SERPL W P-5'-P-CCNC: 16 U/L (ref 13–39)
BASOPHILS # BLD AUTO: 0.04 THOUSANDS/ΜL (ref 0–0.1)
BASOPHILS NFR BLD AUTO: 1 % (ref 0–1)
BILIRUB SERPL-MCNC: 0.43 MG/DL (ref 0.2–1)
BUN SERPL-MCNC: 17 MG/DL (ref 5–25)
CALCIUM SERPL-MCNC: 8.9 MG/DL (ref 8.4–10.2)
CHLORIDE SERPL-SCNC: 105 MMOL/L (ref 96–108)
CHOLEST SERPL-MCNC: 213 MG/DL
CO2 SERPL-SCNC: 25 MMOL/L (ref 21–32)
CREAT SERPL-MCNC: 0.65 MG/DL (ref 0.6–1.3)
EOSINOPHIL # BLD AUTO: 0.19 THOUSAND/ΜL (ref 0–0.61)
EOSINOPHIL NFR BLD AUTO: 4 % (ref 0–6)
ERYTHROCYTE [DISTWIDTH] IN BLOOD BY AUTOMATED COUNT: 13.5 % (ref 11.6–15.1)
GFR SERPL CREATININE-BSD FRML MDRD: 82 ML/MIN/1.73SQ M
GLUCOSE P FAST SERPL-MCNC: 87 MG/DL (ref 65–99)
HCT VFR BLD AUTO: 37.6 % (ref 34.8–46.1)
HDLC SERPL-MCNC: 79 MG/DL
HGB BLD-MCNC: 12.2 G/DL (ref 11.5–15.4)
IMM GRANULOCYTES # BLD AUTO: 0.01 THOUSAND/UL (ref 0–0.2)
IMM GRANULOCYTES NFR BLD AUTO: 0 % (ref 0–2)
LDLC SERPL CALC-MCNC: 122 MG/DL (ref 0–100)
LYMPHOCYTES # BLD AUTO: 1.03 THOUSANDS/ΜL (ref 0.6–4.47)
LYMPHOCYTES NFR BLD AUTO: 22 % (ref 14–44)
MCH RBC QN AUTO: 30.5 PG (ref 26.8–34.3)
MCHC RBC AUTO-ENTMCNC: 32.4 G/DL (ref 31.4–37.4)
MCV RBC AUTO: 94 FL (ref 82–98)
MONOCYTES # BLD AUTO: 0.53 THOUSAND/ΜL (ref 0.17–1.22)
MONOCYTES NFR BLD AUTO: 11 % (ref 4–12)
NEUTROPHILS # BLD AUTO: 2.84 THOUSANDS/ΜL (ref 1.85–7.62)
NEUTS SEG NFR BLD AUTO: 62 % (ref 43–75)
NRBC BLD AUTO-RTO: 0 /100 WBCS
PLATELET # BLD AUTO: 280 THOUSANDS/UL (ref 149–390)
PMV BLD AUTO: 9.8 FL (ref 8.9–12.7)
POTASSIUM SERPL-SCNC: 4.3 MMOL/L (ref 3.5–5.3)
PROT SERPL-MCNC: 6.7 G/DL (ref 6.4–8.4)
RBC # BLD AUTO: 4 MILLION/UL (ref 3.81–5.12)
SODIUM SERPL-SCNC: 139 MMOL/L (ref 135–147)
TRIGL SERPL-MCNC: 61 MG/DL
TSH SERPL DL<=0.05 MIU/L-ACNC: 1.32 UIU/ML (ref 0.45–4.5)
WBC # BLD AUTO: 4.64 THOUSAND/UL (ref 4.31–10.16)

## 2024-10-08 PROCEDURE — 80053 COMPREHEN METABOLIC PANEL: CPT

## 2024-10-08 PROCEDURE — 84443 ASSAY THYROID STIM HORMONE: CPT

## 2024-10-08 PROCEDURE — 36415 COLL VENOUS BLD VENIPUNCTURE: CPT

## 2024-10-08 PROCEDURE — 80061 LIPID PANEL: CPT

## 2024-10-08 PROCEDURE — 85025 COMPLETE CBC W/AUTO DIFF WBC: CPT

## 2024-10-10 ENCOUNTER — EVALUATION (OUTPATIENT)
Dept: PHYSICAL THERAPY | Facility: OTHER | Age: 84
End: 2024-10-10
Payer: COMMERCIAL

## 2024-10-10 DIAGNOSIS — M25.511 ACUTE PAIN OF RIGHT SHOULDER: Primary | ICD-10-CM

## 2024-10-10 PROCEDURE — 97161 PT EVAL LOW COMPLEX 20 MIN: CPT | Performed by: PHYSICAL THERAPIST

## 2024-10-10 PROCEDURE — 97110 THERAPEUTIC EXERCISES: CPT | Performed by: PHYSICAL THERAPIST

## 2024-10-10 PROCEDURE — 97140 MANUAL THERAPY 1/> REGIONS: CPT | Performed by: PHYSICAL THERAPIST

## 2024-10-10 NOTE — PROGRESS NOTES
PT Evaluation     Today's date: 10/10/2024  Patient name: Margaret Lutz  : 1940  MRN: [id number]  Referring provider: Jamie Lagos DO  Dx: No diagnosis found.               Assessment  Impairments: abnormal coordination, abnormal muscle firing, abnormal or restricted ROM, activity intolerance, impaired physical strength, lacks appropriate home exercise program, pain with function and poor body mechanics  Symptom irritability: moderate    Assessment details: Margaret Lutz is a pleasant 84 y.o. female who presents with R shoulder pain after a fall on 24 on to a outstretch arm. She was walking behind bushes a the time. She reported she aggravated It again in the past week. She does have pain with dressing and IADLs. Difficulty with reaching OH. She is taking IBU for pain. Unable to raise her arm to do her hair. No N/T. Did not aggravate the L wrist during the fall. Patient would like to avoid any interventions or further imaging if possible. We discussed monitoring closely if she does not make progress in AROM she will reach out to the orto office for the MRI.   HEP issued and POC reviewed.     Understanding of Dx/Px/POC: good     Prognosis: good    Goals  Short Term:  Pt will report decreased levels of pain by at least 2 subjective ratings in 4 weeks  Pt will demonstrate improved ROM by at least 10 degrees in 4 weeks  Pt will demonstrate improved strength by 1/2 grade  Long Term:   Pt will be independent in their HEP in 8 weeks  Pt will be be pain free with IADL's  Pt will demonstrate improved FOTO, > 80         Plan  Patient would benefit from: skilled physical therapy  Planned modality interventions: thermotherapy: hydrocollator packs    Planned therapy interventions: activity modification, joint mobilization, manual therapy, motor coordination training, neuromuscular re-education, patient education, self care, therapeutic activities, therapeutic exercise, graded activity and home exercise  program    Frequency: 2x week  Treatment plan discussed with: patient  Plan details: Prognosis above is given PT services 2x/week tapering to 1x/week over the next 3 months and home program adherence.      Subjective Evaluation    Patient Goals  Patient goals for therapy: decreased pain, independence with ADLs/IADLs, return to sport/leisure activities, increased motion and increased strength    Pain  At best pain ratin      Objective     General Comments:      Shoulder Comments   2/5 ER. + drop arm and + ER lag sign. IR 4/5   AROM Flex 40 unable to perform functional IR/ER   PROM 110 ER 45 IR 45                Precautions:       Manuals 10/10             PROM  MJ                                                    Neuro Re-Ed             [place] FE              Isometrics                                                                               Ther Ex             Pullies              Table slide              Digitflex                                                                               Ther Activity                                       Gait Training                                       Modalities

## 2024-10-10 NOTE — LETTER
October 10, 2024    Jamie Demond,   [facility]  [address]   [address]  [facility] [address]    Patient: Margaret Lutz   YOB: 1940   Date of Visit: 10/10/2024   No diagnosis found.    Dear Dr. Lagos:    Thank you for your recent referral of Margaret Lutz. Please review the attached evaluation summary from Margaret's recent visit.     Please verify that you agree with the plan of care by signing the attached order.     If you have any questions or concerns, please do not hesitate to call.     I sincerely appreciate the opportunity to share in the care of one of your patients and hope to have another opportunity to work with you in the near future.       Sincerely,    Dannie Tavares, PT      Referring Provider:      I certify that I have read the below Plan of Care and certify the need for these services furnished under this plan of treatment while under my care.                    Jamie Lagos   [facility]  [address]   [address]  [facility] [address]  Via Fax: [phone]          PT Evaluation     Today's date: 10/10/2024  Patient name: Margaret Lutz  : 1940  MRN: [id number]  Referring provider: Jamie Lagos DO  Dx: No diagnosis found.               Assessment  Impairments: abnormal coordination, abnormal muscle firing, abnormal or restricted ROM, activity intolerance, impaired physical strength, lacks appropriate home exercise program, pain with function and poor body mechanics  Symptom irritability: moderate    Assessment details: Margaret Lutz is a pleasant 84 y.o. female who presents with R shoulder pain after a fall on 24 on to a outstretch arm. She was walking behind bushes a the time. She reported she aggravated It again in the past week. She does have pain with dressing and IADLs. Difficulty with reaching OH. She is taking IBU for pain. Unable to raise her arm to do her hair. No N/T. Did not aggravate the L wrist during the fall. Patient would like to avoid any interventions or  further imaging if possible. We discussed monitoring closely if she does not make progress in AROM she will reach out to the orto office for the MRI.   HEP issued and POC reviewed.     Understanding of Dx/Px/POC: good     Prognosis: good    Goals  Short Term:  Pt will report decreased levels of pain by at least 2 subjective ratings in 4 weeks  Pt will demonstrate improved ROM by at least 10 degrees in 4 weeks  Pt will demonstrate improved strength by 1/2 grade  Long Term:   Pt will be independent in their HEP in 8 weeks  Pt will be be pain free with IADL's  Pt will demonstrate improved FOTO, > 80         Plan  Patient would benefit from: skilled physical therapy  Planned modality interventions: thermotherapy: hydrocollator packs    Planned therapy interventions: activity modification, joint mobilization, manual therapy, motor coordination training, neuromuscular re-education, patient education, self care, therapeutic activities, therapeutic exercise, graded activity and home exercise program    Frequency: 2x week  Treatment plan discussed with: patient  Plan details: Prognosis above is given PT services 2x/week tapering to 1x/week over the next 3 months and home program adherence.      Subjective Evaluation    Patient Goals  Patient goals for therapy: decreased pain, independence with ADLs/IADLs, return to sport/leisure activities, increased motion and increased strength    Pain  At best pain ratin      Objective     General Comments:      Shoulder Comments   2/5 ER. + drop arm and + ER lag sign. IR 4/5   AROM Flex 40 unable to perform functional IR/ER   PROM 110 ER 45 IR 45                Precautions:       Manuals 10/10             PROM  MJ                                                    Neuro Re-Ed             [place] FE              Isometrics                                                                               Ther Ex             Pullies              Table slide              Digitflex                                                                                Ther Activity                                       Gait Training                                       Modalities

## 2024-10-11 ENCOUNTER — VBI (OUTPATIENT)
Dept: ADMINISTRATIVE | Facility: OTHER | Age: 84
End: 2024-10-11

## 2024-10-11 NOTE — TELEPHONE ENCOUNTER
10/11/24 2:42 PM    The patient was called and a message was left with the return number for Central Scheduling [phone].    Thank you.  Richa Stinson MA  [facility]

## 2024-10-14 ENCOUNTER — OFFICE VISIT (OUTPATIENT)
Dept: PHYSICAL THERAPY | Facility: OTHER | Age: 84
End: 2024-10-14
Payer: COMMERCIAL

## 2024-10-14 DIAGNOSIS — M25.511 ACUTE PAIN OF RIGHT SHOULDER: Primary | ICD-10-CM

## 2024-10-14 PROCEDURE — 97110 THERAPEUTIC EXERCISES: CPT | Performed by: PHYSICAL THERAPIST

## 2024-10-14 PROCEDURE — 97140 MANUAL THERAPY 1/> REGIONS: CPT | Performed by: PHYSICAL THERAPIST

## 2024-10-14 NOTE — PROGRESS NOTES
Daily Note     Today's date: 10/14/2024  Patient name: Margaret Lutz  : 1940  MRN: [id number]  Referring provider: Jamie Lagos DO  Dx: No diagnosis found.    Start Time: 1100  Stop Time: 1200  Total time in clinic (min): 60 minutes    Subjective:       Objective: See treatment diary below      Assessment: Tolerated treatment well. Patient demonstrated fatigue post treatment      Plan: Continue per plan of care.      Precautions:       Manuals 10/104            PROM  MJ                                                    Neuro Re-Ed             [place] FE  10x2             Isometrics  Flex ext and IR 5''x10             Table slide inclide 10x2             Std row  10x2                                                    Ther Ex             Pullies  3min             Table slide  5''x10x2                                                                                           Ther Activity                                       Gait Training                                       Modalities

## 2024-10-17 DIAGNOSIS — I10 BENIGN HYPERTENSION: ICD-10-CM

## 2024-10-17 RX ORDER — AMLODIPINE AND BENAZEPRIL HYDROCHLORIDE 5; 10 MG/1; MG/1
1 CAPSULE ORAL DAILY
Qty: 90 CAPSULE | Refills: 1 | Status: SHIPPED | OUTPATIENT
Start: 2024-10-17

## 2024-10-17 NOTE — TELEPHONE ENCOUNTER
Patient called because she needs a refill for amLODIPine-benazepril (LOTREL 5-10) 5-10 MG per capsule.

## 2024-10-18 ENCOUNTER — OFFICE VISIT (OUTPATIENT)
Dept: PHYSICAL THERAPY | Facility: OTHER | Age: 84
End: 2024-10-18
Payer: COMMERCIAL

## 2024-10-18 DIAGNOSIS — M25.511 ACUTE PAIN OF RIGHT SHOULDER: Primary | ICD-10-CM

## 2024-10-18 PROCEDURE — 97112 NEUROMUSCULAR REEDUCATION: CPT | Performed by: PHYSICAL THERAPIST

## 2024-10-18 PROCEDURE — 97140 MANUAL THERAPY 1/> REGIONS: CPT | Performed by: PHYSICAL THERAPIST

## 2024-10-18 NOTE — PROGRESS NOTES
Daily Note     Today's date: 10/18/2024  Patient name: Margaret Lutz  : 1940  MRN: [id number]  Referring provider: Jamie Lagos DO  Dx: No diagnosis found.               Subjective: some soreness after last visit. She will try and improve HEP at home this weekend.       Objective: See treatment diary below      Assessment: Tolerated treatment well. Patient demonstrated fatigue post treatment      Plan: Continue per plan of care.      Precautions:       Manuals 10/184            PROM  MJ             AAROM flex  10x2                                       Neuro Re-Ed             [place] FE  10x2             Isometrics  Flex ext and IR 5''x10             Table slide inclide 10x2             Std row  10x2                                                    Ther Ex             Pullies  3min             Table slide  5''x10x2                                                                                           Ther Activity                                       Gait Training                                       Modalities

## 2024-10-21 ENCOUNTER — OFFICE VISIT (OUTPATIENT)
Dept: PHYSICAL THERAPY | Facility: OTHER | Age: 84
End: 2024-10-21
Payer: COMMERCIAL

## 2024-10-21 DIAGNOSIS — M25.511 ACUTE PAIN OF RIGHT SHOULDER: Primary | ICD-10-CM

## 2024-10-21 PROCEDURE — 97112 NEUROMUSCULAR REEDUCATION: CPT | Performed by: PHYSICAL THERAPIST

## 2024-10-21 PROCEDURE — 97140 MANUAL THERAPY 1/> REGIONS: CPT | Performed by: PHYSICAL THERAPIST

## 2024-10-21 NOTE — PROGRESS NOTES
Daily Note     Today's date: 10/21/2024  Patient name: aMrgaret Lutz  : 1940  MRN: [id number]  Referring provider: Jamie Lagos DO  Dx: No diagnosis found.               Subjective: sorensss after last visit.       Objective: See treatment diary below      Assessment: Tolerated treatment well. Patient demonstrated fatigue post treatment      Plan: Continue per plan of care.      Precautions:       Manuals 10/184 10.21           PROM  MJ  MJ            AAROM flex  10x2  10x2                                      Neuro Re-Ed             [place] FE  10x2  10x2            Isometrics  Flex ext and IR 5''x10  5''x10            Table slide inclide 10x2  10x2            Std row  10x2  10x2            Supine B/L scap punch  10x                                      Ther Ex             Pullies  3min  3min            Table slide  5''x10x2  5''x2min                                                                                          Ther Activity                                       Gait Training                                       Modalities

## 2024-10-24 ENCOUNTER — OFFICE VISIT (OUTPATIENT)
Dept: PHYSICAL THERAPY | Facility: OTHER | Age: 84
End: 2024-10-24
Payer: COMMERCIAL

## 2024-10-24 DIAGNOSIS — M25.511 ACUTE PAIN OF RIGHT SHOULDER: Primary | ICD-10-CM

## 2024-10-24 PROCEDURE — 97140 MANUAL THERAPY 1/> REGIONS: CPT | Performed by: PHYSICAL THERAPIST

## 2024-10-24 PROCEDURE — 97112 NEUROMUSCULAR REEDUCATION: CPT | Performed by: PHYSICAL THERAPIST

## 2024-10-24 PROCEDURE — 97110 THERAPEUTIC EXERCISES: CPT | Performed by: PHYSICAL THERAPIST

## 2024-10-24 NOTE — PROGRESS NOTES
Daily Note     Today's date: 10/24/2024  Patient name: Margaret Lutz  : 1940  MRN: [id number]  Referring provider: Jamie Lagos DO  Dx:   Encounter Diagnosis     ICD-10-CM    1. Acute pain of right shoulder  M25.511                      Subjective: she notes some progres siwht IADL's       Objective: See treatment diary below      Assessment: Tolerated treatment well. Patient demonstrated fatigue post treatment      Plan: Continue per plan of care.      Precautions:       Manuals 10/184 10.24           PROM  MJ  MJ            AAROM flex  10x2  10x2                                      Neuro Re-Ed             [place] FE  10x2  10x2            Isometrics  Flex ext and IR 5''x10  5''x10            Table slide inclide 10x2  10x2            Std row  10x2  10x2            Supine B/L scap punch  10x                                      Ther Ex             Pullies  3min  3min            Table slide  5''x10x2  5''x2min                                                                                          Ther Activity                                       Gait Training                                       Modalities

## 2024-10-28 ENCOUNTER — OFFICE VISIT (OUTPATIENT)
Dept: PHYSICAL THERAPY | Facility: OTHER | Age: 84
End: 2024-10-28
Payer: COMMERCIAL

## 2024-10-28 DIAGNOSIS — M25.511 ACUTE PAIN OF RIGHT SHOULDER: Primary | ICD-10-CM

## 2024-10-28 PROCEDURE — 97112 NEUROMUSCULAR REEDUCATION: CPT | Performed by: PHYSICAL THERAPIST

## 2024-10-28 PROCEDURE — 97140 MANUAL THERAPY 1/> REGIONS: CPT | Performed by: PHYSICAL THERAPIST

## 2024-10-28 NOTE — PROGRESS NOTES
Daily Note     Today's date: 10/28/2024  Patient name: Margaret Lutz  : 1940  MRN: [id number]  Referring provider: Jamie Lagos DO  Dx: No diagnosis found.               Subjective: she is notivng some changes to IADL's less pain with certain activities. It was recommended though that she does proceed wit hthe MRI not much in terms of AROM has improved.       Objective: See treatment diary below      Assessment: Tolerated treatment well. Patient demonstrated fatigue post treatment      Plan: Continue per plan of care.      Precautions:       Manuals 10/184 10.28           PROM  MJ  MJ            AAROM flex  10x2  10x2                                      Neuro Re-Ed             [place] FE  10x2  10x2            Isometrics  Flex ext and IR 5''x10  5''x10            Table slide inclide 10x2  10x2            Std row  10x2  10x2            Supine B/L scap punch  10x                                      Ther Ex             Pullies  3min  3min            Table slide  5''x10x2  5''x2min                                                                                          Ther Activity                                       Gait Training                                       Modalities

## 2024-10-29 ENCOUNTER — APPOINTMENT (OUTPATIENT)
Dept: PHYSICAL THERAPY | Facility: OTHER | Age: 84
End: 2024-10-29
Payer: COMMERCIAL

## 2024-10-31 ENCOUNTER — OFFICE VISIT (OUTPATIENT)
Dept: PHYSICAL THERAPY | Facility: OTHER | Age: 84
End: 2024-10-31
Payer: COMMERCIAL

## 2024-10-31 DIAGNOSIS — M25.511 ACUTE PAIN OF RIGHT SHOULDER: Primary | ICD-10-CM

## 2024-10-31 PROCEDURE — 97112 NEUROMUSCULAR REEDUCATION: CPT | Performed by: PHYSICAL THERAPIST

## 2024-10-31 PROCEDURE — 97140 MANUAL THERAPY 1/> REGIONS: CPT | Performed by: PHYSICAL THERAPIST

## 2024-10-31 NOTE — PROGRESS NOTES
Daily Note     Today's date: 10/31/2024  Patient name: Margaret Lutz  : 1940  MRN: [id number]  Referring provider: Jamie Lagos DO  Dx: No diagnosis found.               Subjective: about the same today pain with AROM she reached out to surgeons office to get the MRI scheduled as he had suggested if she was not making progress.       Objective: See treatment diary below      Assessment: Tolerated treatment well. Patient demonstrated fatigue post treatment      Plan: Continue per plan of care.      Precautions:       Manuals 10/184 10.31           PROM  MJ  MJ            AAROM flex  10x2  10x2                                      Neuro Re-Ed             [place] FE  10x2  10x2            Isometrics  Flex ext and IR 5''x10  5''x10            Table slide inclide 10x2  10x2            Std row  10x2  10x2            Supine B/L scap punch  10x                                      Ther Ex             Pullies  3min  3min            Table slide  5''x10x2  5''x2min                                                                                          Ther Activity                                       Gait Training                                       Modalities

## 2024-11-06 ENCOUNTER — RA CDI HCC (OUTPATIENT)
Dept: OTHER | Facility: HOSPITAL | Age: 84
End: 2024-11-06

## 2024-11-08 ENCOUNTER — EVALUATION (OUTPATIENT)
Dept: PHYSICAL THERAPY | Facility: OTHER | Age: 84
End: 2024-11-08
Payer: COMMERCIAL

## 2024-11-08 DIAGNOSIS — M25.511 ACUTE PAIN OF RIGHT SHOULDER: Primary | ICD-10-CM

## 2024-11-08 PROCEDURE — 97140 MANUAL THERAPY 1/> REGIONS: CPT | Performed by: PHYSICAL THERAPIST

## 2024-11-08 PROCEDURE — 97112 NEUROMUSCULAR REEDUCATION: CPT | Performed by: PHYSICAL THERAPIST

## 2024-11-08 PROCEDURE — 97110 THERAPEUTIC EXERCISES: CPT | Performed by: PHYSICAL THERAPIST

## 2024-11-08 NOTE — PROGRESS NOTES
PT Evaluation     Today's date: 2024  Patient name: Margaret Lutz  : 1940  MRN: [id number]  Referring provider: Jamie Lagos DO  Dx:   Encounter Diagnosis     ICD-10-CM    1. Acute pain of right shoulder  M25.511                      Assessment  Impairments: abnormal coordination, abnormal muscle firing, abnormal or restricted ROM, activity intolerance, impaired physical strength, lacks appropriate home exercise program, pain with function and poor body mechanics  Symptom irritability: moderate    Assessment details: Margaret Lutz is a pleasant 84 y.o. female who presents with R shoulder pain after a fall on 24 on to a outstretch arm. She was walking behind bushes a the time. She reported she aggravated It again in the past week. She does have pain with dressing and IADLs. Difficulty with reaching OH. Since the IE she has improved ROM and less pain at rest. She can tolerate more IADL's. She is taking IBU for pain. She will be getting MRI of the shoulder. She does not want surgery. We discussed continuation of PT after MRI if surgery is not in the plan for her. She was agreeable to this. If she does have a RCT, and elects to treat it non operatively, we will be able to her mange her pain and compensation with deltiod.   HEP issued and POC reviewed.     Understanding of Dx/Px/POC: good     Prognosis: good    Goals  Short Term:  Pt will report decreased levels of pain by at least 2 subjective ratings in 4 weeks  Pt will demonstrate improved ROM by at least 10 degrees in 4 weeks  Pt will demonstrate improved strength by 1/2 grade  Long Term:   Pt will be independent in their HEP in 8 weeks  Pt will be be pain free with IADL's  Pt will demonstrate improved FOTO, > 80         Plan  Patient would benefit from: skilled physical therapy  Planned modality interventions: thermotherapy: hydrocollator packs    Planned therapy interventions: activity modification, joint mobilization, manual therapy, motor  coordination training, neuromuscular re-education, patient education, self care, therapeutic activities, therapeutic exercise, graded activity and home exercise program    Frequency: 2x week  Treatment plan discussed with: patient  Plan details: Prognosis above is given PT services 2x/week tapering to 1x/week over the next 3 months and home program adherence.      Subjective Evaluation    Patient Goals  Patient goals for therapy: decreased pain, independence with ADLs/IADLs, return to sport/leisure activities, increased motion and increased strength    Pain  At best pain ratin  At worst pain ratin      Objective     General Comments:      Shoulder Comments   2/5 ER. + drop arm and + ER lag sign. IR 4/5   AROM Flex 47 unable to perform functional IR/ER   PROM 130 ER 55 IR 50      MRI scheduled for 24              Precautions:       Manuals 10/184 11.8           PROM  MJ  MJ            AAROM flex  10x2  10x2                                      Neuro Re-Ed             [place] FE  10x2  10x2            Isometrics  Flex ext and IR 5''x10  5''x10            Table slide inclide 10x2  10x2            Std row  10x2  10x2            Supine B/L scap punch  10x                                      Ther Ex             Pullies  3min  3min            Table slide  5''x10x2  5''x2min                                                                                          Ther Activity                                       Gait Training                                       Modalities

## 2024-11-14 ENCOUNTER — OFFICE VISIT (OUTPATIENT)
Dept: INTERNAL MEDICINE CLINIC | Facility: CLINIC | Age: 84
End: 2024-11-14
Payer: COMMERCIAL

## 2024-11-14 VITALS
TEMPERATURE: 97.6 F | OXYGEN SATURATION: 97 % | HEART RATE: 79 BPM | DIASTOLIC BLOOD PRESSURE: 66 MMHG | HEIGHT: 63 IN | WEIGHT: 176.4 LBS | BODY MASS INDEX: 31.25 KG/M2 | SYSTOLIC BLOOD PRESSURE: 124 MMHG

## 2024-11-14 DIAGNOSIS — M25.511 ACUTE PAIN OF RIGHT SHOULDER: ICD-10-CM

## 2024-11-14 DIAGNOSIS — J45.30 MILD PERSISTENT ASTHMA WITHOUT COMPLICATION: ICD-10-CM

## 2024-11-14 DIAGNOSIS — Z00.00 MEDICARE ANNUAL WELLNESS VISIT, SUBSEQUENT: Primary | ICD-10-CM

## 2024-11-14 DIAGNOSIS — F41.9 ANXIETY AND DEPRESSION: ICD-10-CM

## 2024-11-14 DIAGNOSIS — F32.A ANXIETY AND DEPRESSION: ICD-10-CM

## 2024-11-14 DIAGNOSIS — I10 BENIGN ESSENTIAL HYPERTENSION: ICD-10-CM

## 2024-11-14 DIAGNOSIS — E78.5 DYSLIPIDEMIA: ICD-10-CM

## 2024-11-14 DIAGNOSIS — Z23 ENCOUNTER FOR IMMUNIZATION: ICD-10-CM

## 2024-11-14 PROCEDURE — 90662 IIV NO PRSV INCREASED AG IM: CPT | Performed by: NURSE PRACTITIONER

## 2024-11-14 PROCEDURE — 99214 OFFICE O/P EST MOD 30 MIN: CPT | Performed by: NURSE PRACTITIONER

## 2024-11-14 PROCEDURE — 90678 RSV VACC PREF BIVALENT IM: CPT | Performed by: NURSE PRACTITIONER

## 2024-11-14 PROCEDURE — G0439 PPPS, SUBSEQ VISIT: HCPCS | Performed by: NURSE PRACTITIONER

## 2024-11-14 PROCEDURE — G0008 ADMIN INFLUENZA VIRUS VAC: HCPCS | Performed by: NURSE PRACTITIONER

## 2024-11-14 PROCEDURE — 90471 IMMUNIZATION ADMIN: CPT | Performed by: NURSE PRACTITIONER

## 2024-11-14 NOTE — PROGRESS NOTES
Name: Margaret Lutz      : 1940      MRN: [id number]  Encounter Provider: PO Mulligan  Encounter Date: 2024   Encounter department: [facility] INTERNAL MEDICINE    Assessment & Plan  Medicare annual wellness visit, subsequent         Benign essential hypertension  Well controlled  On amlodipine-benazepril.          Mild persistent asthma without complication  On accolate.  Not taking airsupra.   Albuterol as needed  Sees an allergist.          Anxiety and depression  Stable  Continue bupropion.          Dyslipidemia  Slight improvement- continue a low fat diet.     Orders:    Comprehensive metabolic panel; Future    Lipid Panel with Direct LDL reflex; Future    Acute pain of right shoulder  Completed PT, still with limited ROM.  MRI pending.  Seeing orthopedics.          Encounter for immunization    Orders:    influenza vaccine, high-dose, PF 0.5 mL (Fluzone High Dose)       Preventive health issues were discussed with patient, and age appropriate screening tests were ordered as noted in patient's After Visit Summary. Personalized health advice and appropriate referrals for health education or preventive services given if needed, as noted in patient's After Visit Summary.    History of Present Illness     Margaret is here today for follow up  She is doing well.  She has been in PT for right shoulder pain. She continues to have limited ROM. She had an MRI yesterday.     She has been doing intermittent fasting.          Patient Care Team:  PO Mulligan as PCP - General (Internal Medicine)    Review of Systems   Constitutional:  Negative for activity change, appetite change, fatigue and unexpected weight change.   Eyes:  Negative for visual disturbance.   Respiratory:  Negative for cough, chest tightness, shortness of breath and wheezing.    Cardiovascular:  Negative for chest pain, palpitations and leg swelling.   Gastrointestinal:  Negative for abdominal pain, blood in  stool, constipation and diarrhea.   Genitourinary:  Negative for difficulty urinating.   Musculoskeletal:  Negative for arthralgias.   Skin:  Negative for rash.   Neurological:  Negative for dizziness, weakness, light-headedness and headaches.   Psychiatric/Behavioral:  Negative for sleep disturbance.      Medical History Reviewed by provider this encounter:       Annual Wellness Visit Questionnaire   Margaret is here for her Subsequent Wellness visit.     Health Risk Assessment:   Patient rates overall health as very good. Patient feels that their physical health rating is same. Patient is satisfied with their life. Eyesight was rated as much better. Hearing was rated as much better. Patient feels that their emotional and mental health rating is same. Patients states they are never, rarely angry. Patient states they are sometimes unusually tired/fatigued. Pain experienced in the last 7 days has been a lot. Patient's pain rating has been 4/10. Patient states that she has experienced no weight loss or gain in last 6 months. Shoulder pain    Depression Screening:   PHQ-9 Score: 0      Fall Risk Screening:   In the past year, patient has experienced: history of falling in past year    Number of falls: 2 or more  Injured during fall?: Yes    Feels unsteady when standing or walking?: Yes    Worried about falling?: Yes      Urinary Incontinence Screening:   Patient has not leaked urine accidently in the last six months.     Home Safety:  Patient does not have trouble with stairs inside or outside of their home. Patient has working smoke alarms and has working carbon monoxide detector. Home safety hazards include: none.     Nutrition:   Current diet is Regular. Intermittent fasting    Medications:   Patient is currently taking over-the-counter supplements. OTC medications include: see medication list. Patient is able to manage medications.     Activities of Daily Living (ADLs)/Instrumental Activities of Daily Living (IADLs):    Walk and transfer into and out of bed and chair?: Yes  Dress and groom yourself?: Yes    Bathe or shower yourself?: Yes    Feed yourself? Yes  Do your laundry/housekeeping?: Yes  Manage your money, pay your bills and track your expenses?: Yes  Make your own meals?: Yes    Do your own shopping?: Yes    Previous Hospitalizations:   Any hospitalizations or ED visits within the last 12 months?: No      Advance Care Planning:   Living will: No      PREVENTIVE SCREENINGS      Cardiovascular Screening:    General: Screening Current      Diabetes Screening:     General: Screening Current      Colorectal Cancer Screening:     General: Screening Not Indicated      Breast Cancer Screening:     General: Screening Not Indicated      Cervical Cancer Screening:    General: Screening Not Indicated and History Cervical Cancer      Osteoporosis Screening:      Due for: DXA Axial      Abdominal Aortic Aneurysm (AAA) Screening:        General: Screening Not Indicated      Lung Cancer Screening:     General: Screening Not Indicated      Hepatitis C Screening:    General: Screening Not Indicated    Screening, Brief Intervention, and Referral to Treatment (SBIRT)    Screening    Typical number of drinks in a week: 3    Social Drivers of Health     Financial Resource Strain: Low Risk  (10/3/2023)    Overall Financial Resource Strain (CARDIA)     Difficulty of Paying Living Expenses: Not very hard   Food Insecurity: No Food Insecurity (11/14/2024)    Hunger Vital Sign     Worried About Running Out of Food in the Last Year: Never true     Ran Out of Food in the Last Year: Never true   Transportation Needs: No Transportation Needs (11/14/2024)    PRAPARE - Transportation     Lack of Transportation (Medical): No     Lack of Transportation (Non-Medical): No   Housing Stability: Low Risk  (11/14/2024)    Housing Stability Vital Sign     Unable to Pay for Housing in the Last Year: No     Number of Times Moved in the Last Year: 0     Homeless in  "the Last Year: No   Utilities: Not At Risk (11/14/2024)    [facility] Utilities     Threatened with loss of utilities: No     No results found.    Objective   /66 (BP Location: Left arm, Patient Position: Sitting, Cuff Size: Standard)   Pulse 79   Temp 97.6 °F (36.4 °C) (Temporal)   Ht 5' 2.5\" (1.588 m)   Wt 80 kg (176 lb 6.4 oz)   SpO2 97%   BMI 31.75 kg/m²     Physical Exam  Vitals reviewed.   Constitutional:       Appearance: Normal appearance. She is well-developed.   HENT:      Head: Normocephalic and atraumatic.   Eyes:      Conjunctiva/sclera: Conjunctivae normal.   Neck:      Thyroid: No thyromegaly.   Cardiovascular:      Rate and Rhythm: Normal rate and regular rhythm.      Heart sounds: Normal heart sounds.   Pulmonary:      Effort: Pulmonary effort is normal.      Breath sounds: Normal breath sounds.   Abdominal:      General: Bowel sounds are normal.      Palpations: Abdomen is soft.   Musculoskeletal:         General: Normal range of motion.      Right lower leg: No edema.      Left lower leg: No edema.   Skin:     General: Skin is warm and dry.   Neurological:      Mental Status: She is alert and oriented to person, place, and time.   Psychiatric:         Mood and Affect: Mood normal.         Behavior: Behavior normal.         "

## 2024-11-14 NOTE — ASSESSMENT & PLAN NOTE
Slight improvement- continue a low fat diet.     Orders:    Comprehensive metabolic panel; Future    Lipid Panel with Direct LDL reflex; Future

## 2024-11-14 NOTE — PATIENT INSTRUCTIONS
4/9/21, 1:49 PM EDT    Patient goals/plan/ treatment preferences discussed by  and . Patient goals/plan/ treatment preferences reviewed with patient/ family. Patient/ family verbalize understanding of discharge plan and are in agreement with goal/plan/treatment preferences. Understanding was demonstrated using the teach back method. AVS provided by RN at time of discharge, which includes all necessary medical information pertaining to the patients current course of illness, treatment, post-discharge goals of care, and treatment preferences. IMM Letter  IMM Letter given to Patient/Family/Significant other/Guardian/POA/by[de-identified]   IMM Letter date given[de-identified] 04/09/21  IMM Letter time given[de-identified] 1205       Planning discharge home with wife today. No needs.      Electronically signed by Jessica Weiner RN on 4/9/2021 at 1:49 PM Medicare Preventive Visit Patient Instructions  Thank you for completing your Welcome to Medicare Visit or Medicare Annual Wellness Visit today. Your next wellness visit will be due in one year (11/15/2025).  The screening/preventive services that you may require over the next 5-10 years are detailed below. Some tests may not apply to you based off risk factors and/or age. Screening tests ordered at today's visit but not completed yet may show as past due. Also, please note that scanned in results may not display below.  Preventive Screenings:  Service Recommendations Previous Testing/Comments   Colorectal Cancer Screening  * Colonoscopy    * Fecal Occult Blood Test (FOBT)/Fecal Immunochemical Test (FIT)  * Fecal DNA/Cologuard Test  * Flexible Sigmoidoscopy Age: 45-75 years old   Colonoscopy: every 10 years (may be performed more frequently if at higher risk)  OR  FOBT/FIT: every 1 year  OR  Cologuard: every 3 years  OR  Sigmoidoscopy: every 5 years  Screening may be recommended earlier than age 45 if at higher risk for colorectal cancer. Also, an individualized decision between you and your healthcare provider will decide whether screening between the ages of 76-85 would be appropriate. Colonoscopy: Not on file  FOBT/FIT: Not on file  Cologuard: Not on file  Sigmoidoscopy: Not on file          Breast Cancer Screening Age: 40+ years old  Frequency: every 1-2 years  Not required if history of left and right mastectomy Mammogram: 10/05/2022        Cervical Cancer Screening Between the ages of 21-29, pap smear recommended once every 3 years.   Between the ages of 30-65, can perform pap smear with HPV co-testing every 5 years.   Recommendations may differ for women with a history of total hysterectomy, cervical cancer, or abnormal pap smears in past. Pap Smear: 07/16/2018    Screening Not Indicated  History Cervical Cancer   Hepatitis C Screening Once for adults born between 1945 and 1965  More frequently in patients at  high risk for Hepatitis C Hep C Antibody: Not on file        Diabetes Screening 1-2 times per year if you're at risk for diabetes or have pre-diabetes Fasting glucose: 87 mg/dL (10/8/2024)  A1C: No results in last 5 years (No results in last 5 years)  Screening Current   Cholesterol Screening Once every 5 years if you don't have a lipid disorder. May order more often based on risk factors. Lipid panel: 10/08/2024    Screening Current     Other Preventive Screenings Covered by Medicare:  Abdominal Aortic Aneurysm (AAA) Screening: covered once if your at risk. You're considered to be at risk if you have a family history of AAA.  Lung Cancer Screening: covers low dose CT scan once per year if you meet all of the following conditions: (1) Age 55-77; (2) No signs or symptoms of lung cancer; (3) Current smoker or have quit smoking within the last 15 years; (4) You have a tobacco smoking history of at least 20 pack years (packs per day multiplied by number of years you smoked); (5) You get a written order from a healthcare provider.  Glaucoma Screening: covered annually if you're considered high risk: (1) You have diabetes OR (2) Family history of glaucoma OR (3)  aged 50 and older OR (4)  American aged 65 and older  Osteoporosis Screening: covered every 2 years if you meet one of the following conditions: (1) You're estrogen deficient and at risk for osteoporosis based off medical history and other findings; (2) Have a vertebral abnormality; (3) On glucocorticoid therapy for more than 3 months; (4) Have primary hyperparathyroidism; (5) On osteoporosis medications and need to assess response to drug therapy.   Last bone density test (DXA Scan): Not on file.  HIV Screening: covered annually if you're between the age of 15-65. Also covered annually if you are younger than 15 and older than 65 with risk factors for HIV infection. For pregnant patients, it is covered up to 3 times per  pregnancy.    Immunizations:  Immunization Recommendations   Influenza Vaccine Annual influenza vaccination during flu season is recommended for all persons aged >= 6 months who do not have contraindications   Pneumococcal Vaccine   * Pneumococcal conjugate vaccine = PCV13 (Prevnar 13), PCV15 (Vaxneuvance), PCV20 (Prevnar 20)  * Pneumococcal polysaccharide vaccine = PPSV23 (Pneumovax) Adults 19-63 yo with certain risk factors or if 65+ yo  If never received any pneumonia vaccine: recommend Prevnar 20 (PCV20)  Give PCV20 if previously received 1 dose of PCV13 or PPSV23   Hepatitis B Vaccine 3 dose series if at intermediate or high risk (ex: diabetes, end stage renal disease, liver disease)   Respiratory syncytial virus (RSV) Vaccine - COVERED BY MEDICARE PART D  * RSVPreF3 (Arexvy) CDC recommends that adults 60 years of age and older may receive a single dose of RSV vaccine using shared clinical decision-making (SCDM)   Tetanus (Td) Vaccine - COST NOT COVERED BY MEDICARE PART B Following completion of primary series, a booster dose should be given every 10 years to maintain immunity against tetanus. Td may also be given as tetanus wound prophylaxis.   Tdap Vaccine - COST NOT COVERED BY MEDICARE PART B Recommended at least once for all adults. For pregnant patients, recommended with each pregnancy.   Shingles Vaccine (Shingrix) - COST NOT COVERED BY MEDICARE PART B  2 shot series recommended in those 19 years and older who have or will have weakened immune systems or those 50 years and older     Health Maintenance Due:  There are no preventive care reminders to display for this patient.  Immunizations Due:      Topic Date Due   • Influenza Vaccine (1) 09/01/2024   • COVID-19 Vaccine (4 - 2024-25 season) 09/01/2024     Advance Directives   What are advance directives?  Advance directives are legal documents that state your wishes and plans for medical care. These plans are made ahead of time in case you lose your  ability to make decisions for yourself. Advance directives can apply to any medical decision, such as the treatments you want, and if you want to donate organs.   What are the types of advance directives?  There are many types of advance directives, and each state has rules about how to use them. You may choose a combination of any of the following:  Living will:  This is a written record of the treatment you want. You can also choose which treatments you do not want, which to limit, and which to stop at a certain time. This includes surgery, medicine, IV fluid, and tube feedings.   Durable power of  for healthcare (DPAHC):  This is a written record that states who you want to make healthcare choices for you when you are unable to make them for yourself. This person, called a proxy, is usually a family member or a friend. You may choose more than 1 proxy.  Do not resuscitate (DNR) order:  A DNR order is used in case your heart stops beating or you stop breathing. It is a request not to have certain forms of treatment, such as CPR. A DNR order may be included in other types of advance directives.  Medical directive:  This covers the care that you want if you are in a coma, near death, or unable to make decisions for yourself. You can list the treatments you want for each condition. Treatment may include pain medicine, surgery, blood transfusions, dialysis, IV or tube feedings, and a ventilator (breathing machine).  Values history:  This document has questions about your views, beliefs, and how you feel and think about life. This information can help others choose the care that you would choose.  Why are advance directives important?  An advance directive helps you control your care. Although spoken wishes may be used, it is better to have your wishes written down. Spoken wishes can be misunderstood, or not followed. Treatments may be given even if you do not want them. An advance directive may make it easier  for your family to make difficult choices about your care.   Fall Prevention    Fall prevention  includes ways to make your home and other areas safer. It also includes ways you can move more carefully to prevent a fall. Health conditions that cause changes in your blood pressure, vision, or muscle strength and coordination may increase your risk for falls. Medicines may also increase your risk for falls if they make you dizzy, weak, or sleepy.   Fall prevention tips:   Stand or sit up slowly.    Use assistive devices as directed.    Wear shoes that fit well and have soles that .    Wear a personal alarm.    Stay active.    Manage your medical conditions.    Home Safety Tips:  Add items to prevent falls in the bathroom.    Keep paths clear.    Install bright lights in your home.    Keep items you use often on shelves within reach.    Paint or place reflective tape on the edges of your stairs.    Weight Management   Why it is important to manage your weight:  Being overweight increases your risk of health conditions such as heart disease, high blood pressure, type 2 diabetes, and certain types of cancer. It can also increase your risk for osteoarthritis, sleep apnea, and other respiratory problems. Aim for a slow, steady weight loss. Even a small amount of weight loss can lower your risk of health problems.  How to lose weight safely:  A safe and healthy way to lose weight is to eat fewer calories and get regular exercise. You can lose up about 1 pound a week by decreasing the number of calories you eat by 500 calories each day.   Healthy meal plan for weight management:  A healthy meal plan includes a variety of foods, contains fewer calories, and helps you stay healthy. A healthy meal plan includes the following:  Eat whole-grain foods more often.  A healthy meal plan should contain fiber. Fiber is the part of grains, fruits, and vegetables that is not broken down by your body. Whole-grain foods are healthy and  provide extra fiber in your diet. Some examples of whole-grain foods are whole-wheat breads and pastas, oatmeal, brown rice, and bulgur.  Eat a variety of vegetables every day.  Include dark, leafy greens such as spinach, kale, mary jane greens, and mustard greens. Eat yellow and orange vegetables such as carrots, sweet potatoes, and winter squash.   Eat a variety of fruits every day.  Choose fresh or canned fruit (canned in its own juice or light syrup) instead of juice. Fruit juice has very little or no fiber.  Eat low-fat dairy foods.  Drink fat-free (skim) milk or 1% milk. Eat fat-free yogurt and low-fat cottage cheese. Try low-fat cheeses such as mozzarella and other reduced-fat cheeses.  Choose meat and other protein foods that are low in fat.  Choose beans or other legumes such as split peas or lentils. Choose fish, skinless poultry (chicken or turkey), or lean cuts of red meat (beef or pork). Before you cook meat or poultry, cut off any visible fat.   Use less fat and oil.  Try baking foods instead of frying them. Add less fat, such as margarine, sour cream, regular salad dressing and mayonnaise to foods. Eat fewer high-fat foods. Some examples of high-fat foods include french fries, doughnuts, ice cream, and cakes.  Eat fewer sweets.  Limit foods and drinks that are high in sugar. This includes candy, cookies, regular soda, and sweetened drinks.  Exercise:  Exercise at least 30 minutes per day on most days of the week. Some examples of exercise include walking, biking, dancing, and swimming. You can also fit in more physical activity by taking the stairs instead of the elevator or parking farther away from stores. Ask your healthcare provider about the best exercise plan for you.      © Copyright [employer] 2018 Information is for End User's use only and may not be sold, redistributed or otherwise used for commercial purposes. All illustrations and images included in CareNotes® are the copyrighted  property of [employer] or [employer]

## 2024-11-21 ENCOUNTER — OFFICE VISIT (OUTPATIENT)
Dept: PHYSICAL THERAPY | Facility: OTHER | Age: 84
End: 2024-11-21
Payer: COMMERCIAL

## 2024-11-21 DIAGNOSIS — M25.511 ACUTE PAIN OF RIGHT SHOULDER: Primary | ICD-10-CM

## 2024-11-21 PROCEDURE — 97110 THERAPEUTIC EXERCISES: CPT | Performed by: PHYSICAL THERAPIST

## 2024-11-21 PROCEDURE — 97140 MANUAL THERAPY 1/> REGIONS: CPT | Performed by: PHYSICAL THERAPIST

## 2024-11-21 PROCEDURE — 97112 NEUROMUSCULAR REEDUCATION: CPT | Performed by: PHYSICAL THERAPIST

## 2024-11-21 NOTE — PROGRESS NOTES
Daily Note     Today's date: 2024  Patient name: Margaret Lutz  : 1940  MRN: [id number]  Referring provider: Jamie Lagos DO  Dx: No diagnosis found.               Subjective: MRI results are in Large RCT. She is going to treat this conservatively. Tolerated some light strengthening well.     Objective: See treatment diary below      Assessment: Tolerated treatment well. Patient demonstrated fatigue post treatment      Plan: Continue per plan of care.      Precautions:       Manuals 10/184 11.8 11.21          PROM  MJ  MJ  MJ          AAROM flex  10x2  10x2  10x2                                     Neuro Re-Ed             [place] FE  10x2  10x2  10x2 using cane at home           Isometrics  Flex ext and IR 5''x10  5''x10  5''x10           Table slide inclide 10x2  10x2  10x2           Std row  10x2  10x2  10x2           Supine B/L scap punch  10x  1x           ABC 1x              LPD /ROW    Otb 10x2           Supine X    Peach band NV           Ther Ex             Pullies  3min  3min  3min           Table slide  5''x10x2  5''x2min  5''x10                                                                                        Ther Activity                                       Gait Training                                       Modalities                                               Vascular and Interventional Radiology Pre-Procedure Note     Patient: Talha Draper Date: 2023   : 1941 Attending: AMANDA Maynard   81 year old male      Chief Complaint:  ESRD    Pre-Op Diagnosis:  ESRD    Patient Active Problem List    Diagnosis Date Noted   • Anemia 2022     Priority: Medium   • Skin ulcer of left lower leg with fat layer exposed (CMS/HCC) 2022     Priority: Low   • Junctional rhythm 2022     Priority: Low   • Malignant melanoma of ear, left (CMS/HCC) 2022     Priority: Low     2021.  Shave biopsy of left ear lesion at [facility].  Pathology shows malignant melanoma, superficial spreading type, Breslow depth at least 1.6 mm with ulceration.      2022.  CT head including neck with contrast.  No evidence of intracranial metastatic disease.  There is asymmetric enhancement enlargement of the left parotid gland which may be infectious or inflammatory.  There are multiple left level five lymph nodes not enlarged by size criteria with the largest measuring 9 mm.  Mildly enlarged mediastinal pretracheal lymph node measuring 12 mm.      2022.  PET scan.  Mild increased FDG activity involving the superior aspect of the left ear.  Low-grade asymmetric increased activity throughout the left parotid gland.  12 mm subcarinal lymph node with low-grade activity which is nonspecific.  10 mm precarinal lymph node with low-grade activity which is nonspecific.  No hypermetabolic cervical lymphadenopathy.     2022.  Excision of left ear lesion.  Malignant melanoma which appears to be completely excised.  Tumor thickness 2.5 mm with ulceration present.  Six mitoses per mm2.  Anival level four.  Satellite lesions are present focally present at/abutting the posterior margin.  Humble lymph node is the pre-auricular lymph node and is negative, however, a left neck lymph node shows fragments of lymphatic tissue involved by metastatic  melanoma. pT3b pN1a.  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  BRAF negative.     03/24/2022.  Reexcision of left ear.  Dermal and subcuticular malignancy compatible with malignant melanoma microsatellite/metastasis, present at lateral margin.     03/31/2022.  Radiation oncology consult.  Recommendation for re-excision due to positive margin.  Recommendation for postoperative radiation.    Pembrolizumab     • Pleural effusion, bilateral 02/19/2022     Priority: Low   • Pneumonia 01/13/2022     Priority: Low   • Lactic acid increased 01/13/2022     Priority: Low   • Aortic stenosis, moderate 11/01/2021     Priority: Low     6/15/21 echo: EF 57%. Moderate mitral valve regurgitation.  Mild aortic valve stenosis, mean gradient 10.9 mmHg, CHRISTIAN 1.6 cm².    9/29/22 echo: EF 58%  Moderate aortic valve stenosis.     • Moderate mitral regurgitation 11/01/2021     Priority: Low     6/21 echo: EF 57%     • Diastolic dysfunction 04/27/2021     Priority: Low     5/23/17 echo: Left ventricular ejection fraction, 62 %.  Grade II/IV diastolic dysfunction, aortic valve sclerosis.     • Paresthesia of hand, bilateral 07/28/2020     Priority: Low   • AVF (arteriovenous fistula) (CMS/[facility]) 01/28/2020     Priority: Low   • Secondary hyperparathyroidism of renal origin (CMS/[facility]) 01/28/2020     Priority: Low   • Diabetic polyneuropathy associated with type 2 diabetes mellitus (CMS/[facility]) 01/28/2020     Priority: Low   • History of DVT of lower extremity 01/17/2019     Priority: Low     Left leg. approx 2015.     • Chronic pain of left knee 01/02/2018     Priority: Low     I think he has some DJD along with a ?? Medial meniscal tear.  Will have ortho take a look at him.     • VINAY (obstructive sleep apnea) 10/03/2017     Priority: Low     Patient has been under evaluation by Dr. Harrison in 2017. auto titrating CPAP +10-15 cm      • Nasal ulcer 08/31/2017     Priority: Low   • Obesity hypoventilation syndrome (CMS/[facility]) 06/16/2017      Priority: Low     Patient has been under evaluation by Dr. Harrison in 2017.  On Cpap.     • Colon cancer screening 10/25/2016     Priority: Low     Will need one last colon screening in 2017.     • Class 2 severe obesity due to excess calories with serious comorbidity and body mass index (BMI) of 39.0 to 39.9 in adult (CMS/[facility]) 09/13/2016     Priority: Low     Weight loss strategies are discussed ++patient has lost 21 kg since Spring 2017 (about 48 pounds of fat and water).     • ESRD on hemodialysis (CMS/[facility]) 10/14/2015     Priority: Low     Secondary to diabetic nephropathy.  He is seeing Dr. Pereyra, Nephrology  Lab Results   Component Value Date/Time    SODIUM 135 09/21/2017 07:50 AM    SODIUM 126 (L) 08/07/2017 10:15 AM    POTASSIUM 3.3 (L) 09/21/2017 07:50 AM    POTASSIUM 3.9 08/07/2017 10:15 AM    BUN 29 (H) 09/21/2017 07:50 AM    BUN 79 (H) 08/07/2017 10:15 AM    BUN 40 (H) 07/27/2017 08:00 AM    CREATININE 3.11 (H) 09/21/2017 07:50 AM    CREATININE 5.92 (H) 08/07/2017 10:15 AM    CREATININE 4.41 (H) 07/27/2017 08:00 AM    URIC 5.2 05/29/2017 06:08 AM    URIC 6.5 05/25/2017 05:43 AM    URIC 10.2 (H) 05/24/2017 06:05 AM    GFRNA 19 09/21/2017 07:50 AM    GFRNA 9 08/07/2017 10:15 AM        • Encounter for long-term (current) use of insulin (CMS/[facility]) 10/12/2015     Priority: Low     Current medical therapy is Lantus 12 units nightly     • Hyperlipidemia, mixed 10/12/2015     Priority: Low     Poorly controlled LDL, adequate Triglycerides. Current therapy is Atorvastatin 80 mg nightly, Gemfibrozil 600 mg twice daily.  Need to continue efforts to improve diabetic control.  Lab Results   Component Value Date/Time    CHOLESTEROL 175 04/18/2017 10:59 AM    CHOLESTEROL 219 (H) 04/05/2016 10:53 AM    HDL 40 (L) 04/18/2017 10:59 AM    HDL 40 04/05/2016 10:53 AM    CALCLDL 116 04/18/2017 10:59 AM    CALCLDL 153 (H) 04/05/2016 10:53 AM    TRIGLYCERIDE 93 04/18/2017 10:59 AM    TRIGLYCERIDE 129 04/05/2016 10:53 AM        •  Essential hypertension 10/12/2015     Priority: Low     Well controlled.  Current therapy is Metoprolol 50 mg twice daily, (Previous Losartan 50 mg daily seems to be on Hold), Cardura (Doxazosin) 2 mg daily.     • Type 2 diabetes mellitus with chronic kidney disease on chronic dialysis, with long-term current use of insulin (CMS/[facility]) 10/12/2015     Priority: Low   • Primary osteoarthritis of right hip 10/12/2015     Priority: Low     Patient is status post right total hip at [facility] around September 2012.     • Stasis dermatitis of both legs 10/12/2015     Priority: Low     Left significantly worse than right.  Patient is wearing compression hose.  Diuretic therapy no longer used due to ESRD and HD      • Atrial myxoma 08/17/2015     Priority: Low     Not visible on 5/23/2017 echocardiogram.       Past Medical History:   Diagnosis Date   • Acute hypoxemic respiratory failure due to COVID-19 (CMS/[facility]) 1/13/2022   • Allergy    • Blood clot associated with vein wall inflammation     left LE   • Cortical cataract    • Diabetes mellitus (CMS/[facility])    • Dialysis patient (CMS/[facility])    • Diastolic dysfunction 04/27/2021 5/23/17 echo: Left ventricular ejection fraction, 62 %. Grade II/IV diastolic dysfunction, aortic valve sclerosis.   • End-stage renal disease on hemodialysis (CMS/[facility])    • Essential (primary) hypertension    • Fracture     left leg in the 1960's   • High cholesterol    • Morbid obesity (CMS/[facility])    • VINAY (obstructive sleep apnea) 10/03/2017   • Presbyopia    • Skin cancer     squamous cell- forehead; malignant melanoma - left superior helix   • Sleep apnea    • Venous insufficiency, peripheral       Past Surgical History:   Procedure Laterality Date   • Appendectomy     • Av fistula placement Left 07/27/2017    Roberto Foss MD   • Av fistula repair Left 09/21/2017    with superficialization of the cephalic vein along the anterior upper arm by Roberto Foss MD   • Carpal tunnel release Right    •  Rotator cuff repair Bilateral    • Skin biopsy  08/30/2022    Basal cell carcinoma - right nasal alar crease   • Tonsillectomy and adenoidectomy        Social History     Tobacco Use   • Smoking status: Never   • Smokeless tobacco: Never   Substance Use Topics   • Alcohol use: Yes     Alcohol/week: 2.0 standard drinks     Types: 2 Cans of beer per week      Family History   Problem Relation Age of Onset   • Patient is unaware of any medical problems Mother    • Diabetes Father    • High blood pressure Father    • Patient is unaware of any medical problems Sister    • Pacemaker Brother    • Heart disease Brother         arrythmia   • Pacemaker Brother    • Diabetes Maternal Grandfather       (Not in a hospital admission)    Current Outpatient Medications   Medication Sig Dispense Refill   • calcium acetate gelcap (PHOSLO) 667 MG capsule TAKE TWO CAPSULES BY MOUTH WITH EACH MEAL THREE TIMES DAILY 540 capsule 3   • mupirocin (BACTROBAN) 2 % ointment Apply ointment to left shin with bandage changes two to three times daily. 30 g 1   • insulin glargine (Lantus SoloStar) 100 UNIT/ML pen-injector Inject 12 Units into the skin nightly. Prime 2 units before each dose. 45 mL 0   • atorvastatin (LIPITOR) 80 MG tablet Take 1 tablet by mouth daily. 90 tablet 3   • Renvela 800 MG tablet TAKE 2 TABLETS BY MOUTH WITH EACH MEAL 180 tablet 3   • B Complex-C-Folic Acid (Dialyvite) tablet TAKE 1 TABLET BY MOUTH DAILY 90 tablet 3   • Insulin Pen Needle (Pen Needles 5/16\") 31G X 8 MM Misc Needles for Lantus solostar pen BD 31 G x 5/16, 0.25 mm x 8 mm for daily use,DX:E11.29 100 each 1   • Lancets (OneTouch Delica Plus [id number]) Misc TEST BLOOD SUGARS THREE TIMES DAILY BEFORE MEALS 100 each 11   • Accu-Chek [place] Plus test strip USE TO TEST BLOOD SUGARS THREE TIMES DAILY BEFORE MEALS (Patient taking differently: Have not been checking since discharged from hospital on 1/19/2022) 300 strip 3   • blood glucose meter Check blood sugar check  3 times daily before meal Code: E11.22 1 kit 0   • Protein Powder Take 25 mg by mouth daily (with breakfast).     • NON FORMULARY ResMed Large sized Nasal Pad-daily for nose protection with CPAP. Place on bridge of nose when CPAP is placed at HS     • aspirin 81 MG tablet Take 81 mg by mouth daily.       No current facility-administered medications for this encounter.     Current Medications Reviewed: No    ALLERGIES:   Allergen Reactions   • Caduet Other (See Comments)   • Calcium Carbonate-Simethicone SWELLING   • Titralac Other (See Comments)     Tongue and throat swelled      Current Allergies Reviewed:Yes    Review of Systems:  Patient denies nausea and vomiting, tolerating oral intake  No fevers overnight  No chest pain or shortness of breath    Pregnancy Status: not possible   Last menstrual period: No LMP for male patient.    Laboratory Results:  Lab Results   Component Value Date    SODIUM 142 12/29/2022    POTASSIUM 5.2 (H) 12/29/2022    BUN 44 (H) 12/29/2022    CREATININE 5.62 (H) 12/29/2022    WBC 9.9 01/23/2023    HCT 33.7 (L) 01/23/2023    HGB 11.1 (L) 01/23/2023     01/23/2023    INR 1.1 02/11/2022    BILIRUBIN 0.6 12/29/2022     01/13/2022    RAPDTR <0.02 06/12/2017     (H) 05/22/2017    GLUCOSE 256 (H) 12/29/2022    TSH 1.566 12/29/2022    MG 2.1 06/12/2017       Lab Results Reviewed: Yes    PHYSICAL ASSESSMENT  Vital signs in last 24 hours:  Temp:  [98.6 °F (37 °C)] 98.6 °F (37 °C)  Heart Rate:  [78] 78  Resp:  [20] 20  BP: (161)/(77) 161/77    Cardiac: RRR  HEENT: III (soft and hard palate and base of uvula visible)  Respiratory: unlabored   Neurological: alert and coherent   Vascular: warm and acyanotic    ASA score: ASA 3: Severe systemic disease, limits activity, not incapacitated    History of Sedation: No complications    Sleep Apnea: Yes    Plan for sedation was discussed with the patient: minimal/moderate sedation    Planned Procedure:  Fistulagram with possible  intervention, possible dialysis catheter placement    The procedure, risks (including but not limited to bleeding, infection, damage to structures requiring surgery to repair, breathing problems), benefits, and alternatives were discussed with the patient who gives consent to proceed: Yes    Assessment/Plan:  Talha Draper is a 81 year old male with   ESRD  Will proceed with fistulagram with possible intervention, possible dialysis catheter placement    Assessing Provider: AMANDA Kendrick                        Time: 2:14 PM

## 2024-11-22 ENCOUNTER — APPOINTMENT (OUTPATIENT)
Dept: PHYSICAL THERAPY | Facility: OTHER | Age: 84
End: 2024-11-22
Payer: COMMERCIAL

## 2024-11-27 ENCOUNTER — APPOINTMENT (OUTPATIENT)
Dept: PHYSICAL THERAPY | Facility: OTHER | Age: 84
End: 2024-11-27
Payer: COMMERCIAL

## 2024-12-03 ENCOUNTER — OFFICE VISIT (OUTPATIENT)
Dept: PHYSICAL THERAPY | Facility: OTHER | Age: 84
End: 2024-12-03
Payer: COMMERCIAL

## 2024-12-03 DIAGNOSIS — M25.511 ACUTE PAIN OF RIGHT SHOULDER: Primary | ICD-10-CM

## 2024-12-03 PROCEDURE — 97110 THERAPEUTIC EXERCISES: CPT | Performed by: PHYSICAL THERAPIST

## 2024-12-03 PROCEDURE — 97112 NEUROMUSCULAR REEDUCATION: CPT | Performed by: PHYSICAL THERAPIST

## 2024-12-03 PROCEDURE — 97140 MANUAL THERAPY 1/> REGIONS: CPT | Performed by: PHYSICAL THERAPIST

## 2024-12-03 NOTE — PROGRESS NOTES
Daily Note     Today's date: 12/3/2024  Patient name: Margaret Lutz  : 1940  MRN: [id number]  Referring provider: Jamie Lagos DO  Dx: No diagnosis found.               Subjective: still considering surgery meets with the surgeon on the .  She does not think she would be ok with the recovery time frame, and the assistance she would need.     Objective: See treatment diary below      Assessment: Tolerated treatment well. Patient demonstrated fatigue post treatment      Plan: Continue per plan of care.      Precautions:       Manuals 10/184 11.8 11.21 12.3         PROM  MJ  MJ  MJ   mJ         AAROM flex  10x2  10x2  10x2                                     Neuro Re-Ed             [place] FE  10x2  10x2  10x2 using cane at home  10x2          S/L ER     10x2          Isometrics  Flex ext and IR 5''x10  5''x10  5''x10  5''x10 flex/IR/ER         Table slide inclide 10x2  10x2  10x2  10x2 #2          Std row  10x2  10x2  10x2  10x2          Supine B/L scap punch  10x  1x  10x          ABC 1x              LPD /ROW    Otb 10x2  Otb 10x2          Supine X    Peach band NV           Ther Ex             Pullies  3min  3min  3min  3min          Table slide  5''x10x2  5''x2min  5''x10 5''x10                                                                                        Ther Activity                                       Gait Training                                       Modalities

## 2024-12-05 ENCOUNTER — OFFICE VISIT (OUTPATIENT)
Dept: PHYSICAL THERAPY | Facility: OTHER | Age: 84
End: 2024-12-05
Payer: COMMERCIAL

## 2024-12-05 DIAGNOSIS — M25.511 ACUTE PAIN OF RIGHT SHOULDER: Primary | ICD-10-CM

## 2024-12-05 PROCEDURE — 97140 MANUAL THERAPY 1/> REGIONS: CPT | Performed by: PHYSICAL THERAPIST

## 2024-12-05 PROCEDURE — 97112 NEUROMUSCULAR REEDUCATION: CPT | Performed by: PHYSICAL THERAPIST

## 2024-12-05 NOTE — PROGRESS NOTES
Daily Note     Today's date: 2024  Patient name: Margaret Lutz  : 1940  MRN: [id number]  Referring provider: Jamie Lagos DO  Dx: No diagnosis found.               Subjective: tolerted last visit well no increased pain. Added back in the TB exercises.       Objective: See treatment diary below      Assessment: Tolerated treatment well. Patient demonstrated fatigue post treatment      Plan: Continue per plan of care.      Precautions:       Manuals 10/184 11.8 11.21 12.3 12.5        PROM  MJ  MJ  MJ   mJ MJ        AAROM flex  10x2  10x2  10x2                                     Neuro Re-Ed             [place] FE  10x2  10x2  10x2 using cane at home  10x2  10x2         S/L ER     10x2  10x2         Isometrics  Flex ext and IR 5''x10  5''x10  5''x10  5''x10 flex/IR/ER 5''x10         Table slide inclide 10x2  10x2  10x2  10x2 #2  #2 10x2         Std row  10x2  10x2  10x2  10x2  #2 10x2         Supine B/L scap punch  10x  1x  10x  10x        ABC 1x              LPD /ROW    Otb 10x2  Otb 10x2  Otb 10x2         Supine X    Peach band NV           Ther Ex             Pullies  3min  3min  3min  3min  3min         Table slide  5''x10x2  5''x2min  5''x10 5''x10  5''x10                                                                                       Ther Activity                                       Gait Training                                       Modalities

## 2024-12-09 ENCOUNTER — APPOINTMENT (OUTPATIENT)
Dept: PHYSICAL THERAPY | Facility: OTHER | Age: 84
End: 2024-12-09
Payer: COMMERCIAL

## 2024-12-09 NOTE — PROGRESS NOTES
Daily Note     Today's date: 2024  Patient name: Margaret Lutz  : 1940  MRN: [id number]  Referring provider: Jamie Lagos DO  Dx: No diagnosis found.               Subjective: ***      Objective: See treatment diary below      Assessment: Tolerated treatment well. Patient demonstrated fatigue post treatment      Plan: {PLAN:3598014036}     Precautions:       Manuals 10/184 11.8 11.21 12.3 12.5        PROM  MJ  MJ  MJ   mJ MJ        AAROM flex  10x2  10x2  10x2                                     Neuro Re-Ed             [place] FE  10x2  10x2  10x2 using cane at home  10x2  10x2         S/L ER     10x2  10x2         Isometrics  Flex ext and IR 5''x10  5''x10  5''x10  5''x10 flex/IR/ER 5''x10         Table slide inclide 10x2  10x2  10x2  10x2 #2  #2 10x2         Std row  10x2  10x2  10x2  10x2  #2 10x2         Supine B/L scap punch  10x  1x  10x  10x        ABC 1x              LPD /ROW    Otb 10x2  Otb 10x2  Otb 10x2         Supine X    Peach band NV           Ther Ex             Pullies  3min  3min  3min  3min  3min         Table slide  5''x10x2  5''x2min  5''x10 5''x10  5''x10                                                                                       Ther Activity                                       Gait Training                                       Modalities

## 2024-12-11 ENCOUNTER — OFFICE VISIT (OUTPATIENT)
Dept: PHYSICAL THERAPY | Facility: OTHER | Age: 84
End: 2024-12-11
Payer: COMMERCIAL

## 2024-12-11 DIAGNOSIS — M25.511 ACUTE PAIN OF RIGHT SHOULDER: Primary | ICD-10-CM

## 2024-12-11 PROCEDURE — 97112 NEUROMUSCULAR REEDUCATION: CPT | Performed by: PEDIATRICS

## 2024-12-11 PROCEDURE — 97140 MANUAL THERAPY 1/> REGIONS: CPT | Performed by: PEDIATRICS

## 2024-12-11 PROCEDURE — 97110 THERAPEUTIC EXERCISES: CPT | Performed by: PEDIATRICS

## 2024-12-11 NOTE — PROGRESS NOTES
"Daily Note     Today's date: 2024  Patient name: Margaret Lutz  : 1940  MRN: [id number]  Referring provider: Jamie Lagos DO  Dx:   Encounter Diagnosis     ICD-10-CM    1. Acute pain of right shoulder  M25.511                      Subjective: Patient states she gets sore for about a day after PT sessions.      Objective: See treatment diary below      Assessment: Tolerated treatment well. Patient demonstrated fatigue post treatment. Fatigues quickly with ex as outlined. Most discomfort with ER.      Plan: Continue per plan of care.      Precautions:       Manuals 10/184 11.8 11.21 12.3 12.5 12.11       PROM  MJ  MJ  MJ   mJ MJ EW       AAROM flex  10x2  10x2  10x2                                     Neuro Re-Ed             [place] FE  10x2  10x2  10x2 using cane at home  10x2  10x2  10x2       S/L ER     10x2  10x2  10x2       Isometrics  Flex ext and IR 5''x10  5''x10  5''x10  5''x10 flex/IR/ER 5''x10  5\"x10       Table slide inclide 10x2  10x2  10x2  10x2 #2  #2 10x2  #2 10x2        Std row  10x2  10x2  10x2  10x2  #2 10x2  #2 10x2        Supine B/L scap punch  10x  1x  10x  10x 10x       ABC 1x              LPD /ROW    Otb 10x2  Otb 10x2  Otb 10x2  Otb  10x2       Supine X    Peach band NV           Ther Ex             Pullies  3min  3min  3min  3min  3min  3 min       Table slide  5''x10x2  5''x2min  5''x10 5''x10  5''x10  5\"x10                                                                                     Ther Activity                                       Gait Training                                       Modalities                                                    "

## 2024-12-12 ENCOUNTER — APPOINTMENT (OUTPATIENT)
Dept: PHYSICAL THERAPY | Facility: OTHER | Age: 84
End: 2024-12-12
Payer: COMMERCIAL

## 2024-12-17 ENCOUNTER — APPOINTMENT (OUTPATIENT)
Dept: PHYSICAL THERAPY | Facility: OTHER | Age: 84
End: 2024-12-17
Payer: COMMERCIAL

## 2024-12-19 ENCOUNTER — APPOINTMENT (OUTPATIENT)
Dept: PHYSICAL THERAPY | Facility: OTHER | Age: 84
End: 2024-12-19
Payer: COMMERCIAL

## 2024-12-19 NOTE — PROGRESS NOTES
"Daily Note     Today's date: 2024  Patient name: Margaret Lutz  : 1940  MRN: [id number]  Referring provider: Jamie Lagos DO  Dx: No diagnosis found.               Subjective: ***      Objective: See treatment diary below      Assessment: Tolerated treatment {Tolerated treatment :}. Patient {assessment:0046654499}      Plan: {PLAN:3052026444}     Precautions:     Precautions: ***  CO-MORBIDITIES:  HEP ACCESS CODE:   FOTO Completed On: 10/10/24    POC Expires Unit Limit Auth Expiration Date PT/OT/STVisit Limit   1/10/2025 N/A PENDING on  BOMN   IE on 10/10      3 mon POC            TREATMENT DIARY  Auth Status 11/28 12/3 12/5 12/11 12/19     PENDING               Visit # 8 PENDING PENDING PENDING      Visits Remaining 0 0 0 0          Manuals 10/184 11.8 11.21 12.3 12.5 12.11 FOTO DUE      PROM  MJ  MJ  MJ   mJ MJ EW       AAROM flex  10x2  10x2  10x2                                     Neuro Re-Ed             [place] FE  10x2  10x2  10x2 using cane at home  10x2  10x2  10x2       S/L ER     10x2  10x2  10x2       Isometrics  Flex ext and IR 5''x10  5''x10  5''x10  5''x10 flex/IR/ER 5''x10  5\"x10       Table slide inclide 10x2  10x2  10x2  10x2 #2  #2 10x2  #2 10x2        Std row  10x2  10x2  10x2  10x2  #2 10x2  #2 10x2        Supine B/L scap punch  10x  1x  10x  10x 10x       ABC 1x              LPD /ROW    Otb 10x2  Otb 10x2  Otb 10x2  Otb  10x2       Supine X    Peach band NV           Ther Ex             Pullies  3min  3min  3min  3min  3min  3 min       Table slide  5''x10x2  5''x2min  5''x10 5''x10  5''x10  5\"x10                                                                                     Ther Activity                                       Gait Training                                       Modalities                                                      "

## 2025-01-02 ENCOUNTER — APPOINTMENT (OUTPATIENT)
Dept: PHYSICAL THERAPY | Facility: OTHER | Age: 85
End: 2025-01-02
Payer: COMMERCIAL

## 2025-01-15 DIAGNOSIS — F32.A ANXIETY AND DEPRESSION: ICD-10-CM

## 2025-01-15 DIAGNOSIS — F41.9 ANXIETY AND DEPRESSION: ICD-10-CM

## 2025-01-15 RX ORDER — BUPROPION HYDROCHLORIDE 150 MG/1
150 TABLET, EXTENDED RELEASE ORAL 2 TIMES DAILY
Qty: 180 TABLET | Refills: 1 | Status: SHIPPED | OUTPATIENT
Start: 2025-01-15

## 2025-02-04 ENCOUNTER — EVALUATION (OUTPATIENT)
Dept: PHYSICAL THERAPY | Facility: OTHER | Age: 85
End: 2025-02-04
Payer: COMMERCIAL

## 2025-02-04 DIAGNOSIS — M25.511 ACUTE PAIN OF RIGHT SHOULDER: Primary | ICD-10-CM

## 2025-02-04 PROCEDURE — 97140 MANUAL THERAPY 1/> REGIONS: CPT | Performed by: PHYSICAL THERAPIST

## 2025-02-04 PROCEDURE — 97112 NEUROMUSCULAR REEDUCATION: CPT | Performed by: PHYSICAL THERAPIST

## 2025-02-04 PROCEDURE — 97110 THERAPEUTIC EXERCISES: CPT | Performed by: PHYSICAL THERAPIST

## 2025-02-04 NOTE — PROGRESS NOTES
PT Re-Evaluation     Today's date: 2025  Patient name: Margaret Lutz  : 1940  MRN: [id number]  Referring provider: Jamie Lagos DO  Dx:   No diagnosis found.                 Assessment  Impairments: abnormal coordination, abnormal muscle firing, abnormal or restricted ROM, activity intolerance, impaired physical strength, lacks appropriate home exercise program, pain with function and poor body mechanics  Symptom irritability: moderate    Assessment details: Margaret Lutz is a pleasant 84 y.o. female who presents with R shoulder pain after a fall on 24 on to a outstretch arm. She was walking behind bushes a the time. She reported she aggravated It again in the past week. She does have pain with dressing and IADLs. Difficulty with reaching OH. Since the IE she has improved ROM and less pain at rest. She can tolerate more IADL's. She is taking IBU for pain. She will be getting MRI of the shoulder. She does not want surgery. We discussed continuation of PT after MRI if surgery is not in the plan for her. She was agreeable to this. If she does have a RCT, and elects to treat it non operatively, we will be able to her mange her pain and compensation with deltiod.     2..24: she has increased AROM since the last RA. Pain continues but able to doing more around the house. She has not been good about her exercises. She returns to PT to get back on track. She contineus to be unable to do her own hair and is selective with how she dresses. She seems to be doing well with conservative care. She would like to avoid surgery if possible.      HEP issued and POC reviewed.     Understanding of Dx/Px/POC: good     Prognosis: good    Goals  Short Term:  Pt will report decreased levels of pain by at least 2 subjective ratings in 4 weeks  Pt will demonstrate improved ROM by at least 10 degrees in 4 weeks  Pt will demonstrate improved strength by 1/2 grade  Long Term:   Pt will be independent in their HEP in 8 weeks  Pt  will be be pain free with IADL's  Pt will demonstrate improved FOTO, > 80         Plan  Patient would benefit from: skilled physical therapy  Planned modality interventions: thermotherapy: hydrocollator packs    Planned therapy interventions: activity modification, joint mobilization, manual therapy, motor coordination training, neuromuscular re-education, patient education, self care, therapeutic activities, therapeutic exercise, graded activity and home exercise program    Frequency: 2x week  Treatment plan discussed with: patient  Plan details: Prognosis above is given PT services 2x/week tapering to 1x/week over the next 3 months and home program adherence.      Subjective Evaluation    Patient Goals  Patient goals for therapy: decreased pain, independence with ADLs/IADLs, return to sport/leisure activities, increased motion and increased strength    Pain  At best pain ratin  At worst pain ratin      Objective     General Comments:      Shoulder Comments   3/5 ER. + drop arm and + ER lag sign. IR 4/5   AROM Flex 75 ugetting closer to be able to reach top of head.   PROM 150  ER 75 IR 55               Precautions:       Manuals 10/184 11.8           PROM  MJ  MJ            AAROM flex  10x2  10x2                                      Neuro Re-Ed             [place] FE  10x2  10x2            Isometrics  Flex ext and IR 5''x10  5''x10            Table slide inclide 10x2  10x2            Std row  10x2  10x2            Supine B/L scap punch  10x                                      Ther Ex             Pullies  3min  3min            Table slide  5''x10x2  5''x2min                                                                                          Ther Activity                                       Gait Training                                       Modalities

## 2025-02-06 ENCOUNTER — APPOINTMENT (OUTPATIENT)
Dept: PHYSICAL THERAPY | Facility: OTHER | Age: 85
End: 2025-02-06
Payer: COMMERCIAL

## 2025-02-11 ENCOUNTER — APPOINTMENT (OUTPATIENT)
Dept: PHYSICAL THERAPY | Facility: OTHER | Age: 85
End: 2025-02-11
Payer: COMMERCIAL

## 2025-02-13 ENCOUNTER — APPOINTMENT (OUTPATIENT)
Dept: PHYSICAL THERAPY | Facility: OTHER | Age: 85
End: 2025-02-13
Payer: COMMERCIAL

## 2025-02-18 ENCOUNTER — OFFICE VISIT (OUTPATIENT)
Dept: PHYSICAL THERAPY | Facility: OTHER | Age: 85
End: 2025-02-18
Payer: COMMERCIAL

## 2025-02-18 DIAGNOSIS — M25.511 ACUTE PAIN OF RIGHT SHOULDER: Primary | ICD-10-CM

## 2025-02-18 PROCEDURE — 97140 MANUAL THERAPY 1/> REGIONS: CPT | Performed by: PHYSICAL THERAPIST

## 2025-02-18 PROCEDURE — 97112 NEUROMUSCULAR REEDUCATION: CPT | Performed by: PHYSICAL THERAPIST

## 2025-02-18 PROCEDURE — 97110 THERAPEUTIC EXERCISES: CPT | Performed by: PHYSICAL THERAPIST

## 2025-02-18 NOTE — PROGRESS NOTES
Daily Note     Today's date: 2025  Patient name: Margaret Lutz  : 1940  MRN: [id number]  Referring provider: Jamie Lagos DO  Dx:   Encounter Diagnosis     ICD-10-CM    1. Acute pain of right shoulder  M25.511                      Subjective:  paitent was sore 2 days after last session ended up getting an injection. Doing better today      Objective: See treatment diary below      Assessment: Tolerated treatment well. Patient demonstrated fatigue post treatment      Plan: Continue per plan of care.      Precautions:       Manuals 2.18           PROM  MJ            AAROM flex  10x2                                    Neuro Re-Ed            [place] FE  10x2            Isometrics Flex ir/er  5''x10            Table slide inclide 10x2            Std row  10x2            Supine B/L scap punch 10x            TB row 10x3 red                        Ther Ex            Pullies  3min            Table slide  5''x2min                                                                                    Ther Activity                                    Gait Training                                    Modalities

## 2025-02-20 ENCOUNTER — APPOINTMENT (OUTPATIENT)
Dept: PHYSICAL THERAPY | Facility: OTHER | Age: 85
End: 2025-02-20
Payer: COMMERCIAL

## 2025-02-25 ENCOUNTER — OFFICE VISIT (OUTPATIENT)
Dept: PHYSICAL THERAPY | Facility: OTHER | Age: 85
End: 2025-02-25
Payer: COMMERCIAL

## 2025-02-25 DIAGNOSIS — M25.511 ACUTE PAIN OF RIGHT SHOULDER: Primary | ICD-10-CM

## 2025-02-25 PROCEDURE — 97112 NEUROMUSCULAR REEDUCATION: CPT | Performed by: PHYSICAL THERAPIST

## 2025-02-25 PROCEDURE — 97140 MANUAL THERAPY 1/> REGIONS: CPT | Performed by: PHYSICAL THERAPIST

## 2025-02-25 PROCEDURE — 97110 THERAPEUTIC EXERCISES: CPT | Performed by: PHYSICAL THERAPIST

## 2025-02-26 NOTE — PROGRESS NOTES
Daily Note     Today's date: 2025  Patient name: Margaret Lutz  : 1940  MRN: [id number]  Referring provider: Jamie Lagos DO  Dx:   Encounter Diagnosis     ICD-10-CM    1. Acute pain of right shoulder  M25.511                      Subjective: doing  better ts week. Less pain. Inection seems to be helping.       Objective: See treatment diary below      Assessment: Tolerated treatment well. Patient demonstrated fatigue post treatment      Plan: Continue per plan of care.      Precautions:       Manuals 2.25           PROM  MJ            AAROM flex  10x2                                    Neuro Re-Ed            [place] FE  10x2            Isometrics Flex ir/er  5''x10            Table slide inclide 10x2            Std row  10x2            Supine B/L scap punch 10x            TB row 10x3 red                        Ther Ex            Pullies  3min            Table slide  5''x2min                                                                                    Ther Activity                                    Gait Training                                    Modalities

## 2025-02-27 ENCOUNTER — APPOINTMENT (OUTPATIENT)
Dept: PHYSICAL THERAPY | Facility: OTHER | Age: 85
End: 2025-02-27
Payer: COMMERCIAL

## 2025-03-04 ENCOUNTER — OFFICE VISIT (OUTPATIENT)
Dept: PHYSICAL THERAPY | Facility: OTHER | Age: 85
End: 2025-03-04
Payer: COMMERCIAL

## 2025-03-04 DIAGNOSIS — M25.511 ACUTE PAIN OF RIGHT SHOULDER: Primary | ICD-10-CM

## 2025-03-04 PROCEDURE — 97112 NEUROMUSCULAR REEDUCATION: CPT | Performed by: PHYSICAL THERAPIST

## 2025-03-04 PROCEDURE — 97140 MANUAL THERAPY 1/> REGIONS: CPT | Performed by: PHYSICAL THERAPIST

## 2025-03-04 PROCEDURE — 97110 THERAPEUTIC EXERCISES: CPT | Performed by: PHYSICAL THERAPIST

## 2025-03-04 NOTE — PROGRESS NOTES
Daily Note     Today's date: 3/4/2025  Patient name: Margaret Lutz  : 1940  MRN: [id number]  Referring provider: Jamie Lagos DO  Dx: No diagnosis found.    Start Time: 1045  Stop Time: 1130  Total time in clinic (min): 45 minutes    Subjective: progressing well less pain. Able to add cane AROM exercise at home       Objective: See treatment diary below      Assessment: Tolerated treatment well. Patient demonstrated fatigue post treatment      Plan: Continue per plan of care.      Precautions:       Manuals 3/4            PROM  MJ            AAROM flex  10x2                                    Neuro Re-Ed            [place] FE  10x3           Isometrics Flex   5''x10            Table slide inclide 10x2            Std row  10x2 #2            Supine B/L scap punch 10x 2            TB row 10x3 otb           TB LPD 10x2            Ther Ex            Pullies  3min            Table slide  5''x2min            Slide up iclide  #2 10x2 45 degree                                                                        Ther Activity                                    Gait Training                                    Modalities

## 2025-03-11 ENCOUNTER — OFFICE VISIT (OUTPATIENT)
Dept: PHYSICAL THERAPY | Facility: OTHER | Age: 85
End: 2025-03-11
Payer: COMMERCIAL

## 2025-03-11 DIAGNOSIS — M25.511 ACUTE PAIN OF RIGHT SHOULDER: Primary | ICD-10-CM

## 2025-03-11 PROCEDURE — 97112 NEUROMUSCULAR REEDUCATION: CPT | Performed by: PHYSICAL THERAPIST

## 2025-03-11 PROCEDURE — 97110 THERAPEUTIC EXERCISES: CPT | Performed by: PHYSICAL THERAPIST

## 2025-03-11 PROCEDURE — 97140 MANUAL THERAPY 1/> REGIONS: CPT | Performed by: PHYSICAL THERAPIST

## 2025-03-11 NOTE — PROGRESS NOTES
Daily Note     Today's date: 3/11/2025  Patient name: Margaret Lutz  : 1940  MRN: [id number]  Referring provider: Jamie Lagos DO  Dx:   Encounter Diagnosis     ICD-10-CM    1. Acute pain of right shoulder  M25.511                      Subjective: some challeges with attempted progression ,       Objective: See treatment diary below      Assessment: Tolerated treatment well. Patient demonstrated fatigue post treatment      Plan: Continue per plan of care.      Precautions:       Manuals 3/4  3.11          PROM  MJ  MJ          AAROM flex  10x2  10                                  Neuro Re-Ed            [place] FE  10x3 10x3           Isometrics Flex   5''x10  5''x10           Table slide inclide 10x2  #2 10x2           Std row  10x2 #2  #3 10x2           Supine B/L scap punch 10x 2  10x2           TB row 10x3 otb Gtb 10x3           TB LPD 10x2  Gtb 10x3           Ther Ex            Pullies  3min  3min           Table slide  5''x2min  5['x2min           Slide up iclide  #2 10x2 45 degree                                                                        Ther Activity                                    Gait Training                                    Modalities

## 2025-03-18 ENCOUNTER — APPOINTMENT (OUTPATIENT)
Dept: PHYSICAL THERAPY | Facility: OTHER | Age: 85
End: 2025-03-18
Payer: COMMERCIAL

## 2025-03-20 ENCOUNTER — APPOINTMENT (OUTPATIENT)
Dept: PHYSICAL THERAPY | Facility: OTHER | Age: 85
End: 2025-03-20
Payer: COMMERCIAL

## 2025-03-25 ENCOUNTER — OFFICE VISIT (OUTPATIENT)
Dept: PHYSICAL THERAPY | Facility: OTHER | Age: 85
End: 2025-03-25
Payer: COMMERCIAL

## 2025-03-25 DIAGNOSIS — M25.511 ACUTE PAIN OF RIGHT SHOULDER: Primary | ICD-10-CM

## 2025-03-25 PROCEDURE — 97140 MANUAL THERAPY 1/> REGIONS: CPT | Performed by: PHYSICAL THERAPIST

## 2025-03-25 PROCEDURE — 97110 THERAPEUTIC EXERCISES: CPT | Performed by: PHYSICAL THERAPIST

## 2025-03-25 PROCEDURE — 97112 NEUROMUSCULAR REEDUCATION: CPT | Performed by: PHYSICAL THERAPIST

## 2025-03-27 ENCOUNTER — APPOINTMENT (OUTPATIENT)
Dept: PHYSICAL THERAPY | Facility: OTHER | Age: 85
End: 2025-03-27
Payer: COMMERCIAL

## 2025-03-27 NOTE — PROGRESS NOTES
Daily Note     Today's date: 3/27/2025  Patient name: Margaret Lutz  : 1940  MRN: [id number]  Referring provider: aJmie Lagos DO  Dx:   Encounter Diagnosis     ICD-10-CM    1. Acute pain of right shoulder  M25.511                      Subjective: soreness after last visit. Reported taking it easy and haivng some increased pain with some IADL;s       Objective: See treatment diary below      Assessment: Tolerated treatment well. Patient demonstrated fatigue post treatment      Plan: Continue per plan of care.      Precautions:       Manuals 3/4  3.11 3.27         PROM  MJ  MJ MJ          AAROM flex  10x2  10                                  Neuro Re-Ed            [place] FE  10x3 10x3  10x2          Isometrics Flex   5''x10  5''x10  5;''x10          Table slide inclide 10x2  #2 10x2           Std row  10x2 #2  #3 10x2  10x2          Supine B/L scap punch 10x 2  10x2  10x2          TB row 10x3 otb Gtb 10x3           TB LPD 10x2  Gtb 10x3           Ther Ex            Pullies  3min  3min  3min          Table slide  5''x2min  5['x2min           Slide up iclide  #2 10x2 45 degree                                                                        Ther Activity                                    Gait Training                                    Modalities

## 2025-04-01 ENCOUNTER — OFFICE VISIT (OUTPATIENT)
Dept: PHYSICAL THERAPY | Facility: OTHER | Age: 85
End: 2025-04-01
Payer: COMMERCIAL

## 2025-04-01 DIAGNOSIS — M25.511 ACUTE PAIN OF RIGHT SHOULDER: Primary | ICD-10-CM

## 2025-04-01 PROCEDURE — 97110 THERAPEUTIC EXERCISES: CPT | Performed by: PHYSICAL THERAPIST

## 2025-04-01 PROCEDURE — 97140 MANUAL THERAPY 1/> REGIONS: CPT | Performed by: PHYSICAL THERAPIST

## 2025-04-01 PROCEDURE — 97112 NEUROMUSCULAR REEDUCATION: CPT | Performed by: PHYSICAL THERAPIST

## 2025-04-01 NOTE — PROGRESS NOTES
Daily Note     Today's date: 2025  Patient name: Margaret Lutz  : 1940  MRN: [id number]  Referring provider: Jamie Lagos DO  Dx:   Encounter Diagnosis     ICD-10-CM    1. Acute pain of right shoulder  M25.511                      Subjective:  doing better this week she felt like the laser was helpful.  We will continue to very slowly progress her.       Objective: See treatment diary below      Assessment: Tolerated treatment well. Patient demonstrated fatigue post treatment      Plan: Continue per plan of care.      Precautions:       Manuals 3/4  3.11 3.27 4.1        PROM  MJ  MJ MJ  MJ        AAROM flex  10x2  10                                  Neuro Re-Ed            [place] FE  10x3 10x3  10x2  10x2         Isometrics Flex   5''x10  5''x10  5;''x10  5''x10         Table slide inclide 10x2  #2 10x2           Std row  10x2 #2  #3 10x2  10x2  #2 10x2         Supine B/L scap punch 10x 2  10x2  10x2  10x2         TB row 10x3 otb Gtb 10x3   Gtb 10x2         TB LPD 10x2  Gtb 10x3   Gtb 10x3         Ther Ex            Pullies  3min  3min  3min  3min         Table slide  5''x2min  5['x2min   10''x2min         Slide up iclide  #2 10x2 45 degree                                                                        Ther Activity                                    Gait Training                                    Modalities

## 2025-04-03 ENCOUNTER — APPOINTMENT (OUTPATIENT)
Dept: PHYSICAL THERAPY | Facility: OTHER | Age: 85
End: 2025-04-03
Payer: COMMERCIAL

## 2025-04-08 ENCOUNTER — OFFICE VISIT (OUTPATIENT)
Dept: PHYSICAL THERAPY | Facility: OTHER | Age: 85
End: 2025-04-08
Payer: COMMERCIAL

## 2025-04-08 DIAGNOSIS — M25.511 ACUTE PAIN OF RIGHT SHOULDER: Primary | ICD-10-CM

## 2025-04-08 PROCEDURE — 97110 THERAPEUTIC EXERCISES: CPT | Performed by: PHYSICAL THERAPIST

## 2025-04-08 PROCEDURE — 97112 NEUROMUSCULAR REEDUCATION: CPT | Performed by: PHYSICAL THERAPIST

## 2025-04-08 PROCEDURE — 97140 MANUAL THERAPY 1/> REGIONS: CPT | Performed by: PHYSICAL THERAPIST

## 2025-04-08 NOTE — PROGRESS NOTES
Daily Note     Today's date: 2025  Patient name: Margaret Lutz  : 1940  MRN: [id number]  Referring provider: Jamie Lagos DO  Dx:   Encounter Diagnosis     ICD-10-CM    1. Acute pain of right shoulder  M25.511                      Subjective: strength about the same pain is less.       Objective: See treatment diary below      Assessment: Tolerated treatment well. Patient demonstrated fatigue post treatment      Plan: Continue per plan of care.      Precautions:       Manuals 3/4  3.11 3.27 4.8        PROM  MJ  MJ MJ  MJ        AAROM flex  10x2  10                                  Neuro Re-Ed            [place] FE  10x3 10x3  10x2  10x2         Isometrics Flex   5''x10  5''x10  5;''x10  5''x10         Table slide inclide 10x2  #2 10x2           Std row  10x2 #2  #3 10x2  10x2  #2 10x2         Supine B/L scap punch 10x 2  10x2  10x2  10x2         TB row 10x3 otb Gtb 10x3   Gtb 10x2         TB LPD 10x2  Gtb 10x3   Gtb 10x3         Ther Ex            Pullies  3min  3min  3min  3min         Table slide  5''x2min  5['x2min   10''x2min         Slide up iclide  #2 10x2 45 degree                                                                        Ther Activity                                    Gait Training                                    Modalities

## 2025-04-10 NOTE — PROGRESS NOTES
Cardiology Follow Up    Margaret uLtz  1940  [id number]  [facility]  [address]  [address]  [place] [address] [phone]    1. Benign essential hypertension  POCT ECG      2. Dyslipidemia  POCT ECG            Discussion/Summary:  Ms. Lutz is a pleasant 84-year-old female who presents to the office today for routine follow-up.  Since her last visit she has been feeling well.  She offers no cardiac complaints.    Her blood pressure is adequately controlled in the office today.  No changes were made to her antihypertensive medication regimen.  A low-salt diet was reinforced.    Her most recent lipids were reviewed.  Her LDL is suboptimal.  She has declined intiation of pharmacologic therapy.     She underwent an echocardiogram in 2019 which was unremarkable.    Given she is asymptomatic no testing is advised.    I will see her back in the office in one year sooner if deemed necessary.    Interval History:   Ms. Lutz is a pleasant 84-year-old female who presents to the office today for routine follow-up.      Since her last visit she feels well.  She tore her right rotator cuff and now is undergoing physical therapy.  She is sedentary.  She can ascend steps carrying objects without any difficulty.  She denies any chest pain or shortness of breath.  She denies any signs or symptoms of congestive heart failure including lower extremity edema, paroxysmal nocturnal dyspnea, orthopnea, acute weight gain or increasing abdominal girth.  She denies lightheadedness, syncope or presyncope.  She denies palpitations.  She denies symptoms of claudication.      Medical Problems                 Problem List       Asthma    Benign hypertension    Anxiety and depression    Symptomatic menopausal or female climacteric states    Hyperlipidemia    History of cervical cancer    Lower back pain    Chronic seasonal allergic rhinitis due to  pollen    Mild persistent asthma without complication    Allergic conjunctivitis of both eyes    Allergic rhinitis due to animal (cat) (dog) hair and dander    Intrinsic atopic dermatitis    Shellfish allergy    Laryngopharyngeal reflux    Anxiety    Obesity with body mass index 30 or greater    Pure hypercholesterolemia    Anterior tibialis tendonitis of left leg    COVID-19 virus infection                  Past Medical History:   Diagnosis Date    Anterior tibialis tendonitis of left leg     Cervical cancer (HCC)     Cough 09/23/2024    COVID 10/25/2021    COVID-19 virus infection 10/20/2021    Shingles     Stress fracture of right tibia 04/2022    tibial plateau     Social History     Socioeconomic History    Marital status:      Spouse name: Not on file    Number of children: 3    Years of education: Not on file    Highest education level: Not on file   Occupational History    Occupation: Retired     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   Tobacco Use    Smoking status: Never    Smokeless tobacco: Never   Vaping Use    Vaping status: Never Used   Substance and Sexual Activity    Alcohol use: Yes     Alcohol/week: 1.0 standard drink of alcohol     Types: 1 Glasses of wine per week     Comment: socially    Drug use: Never    Sexual activity: Not Currently   Other Topics Concern    Not on file   Social History Narrative    Single    Retired, advertisement                Marital: .Lives With: Alone.Home Environment: [facility], Uses air , Has a window air conditioner, The basement is dry, Does not use a dehumidifier, The floors are wood, Uses baseboard heating, Uses natural gas heating, [place]: Home is smoke-free.Pets: None.Occupation: Retired, [employer].    Personal Habits:Cigarette Use: Never Smoked Cigarettes.     Social Drivers of Health     Financial Resource Strain: Low Risk  (10/3/2023)    Overall Financial Resource Strain (CARDIA)     Difficulty of Paying  Living Expenses: Not very hard   Food Insecurity: No Food Insecurity (11/14/2024)    Hunger Vital Sign     Worried About Running Out of Food in the Last Year: Never true     Ran Out of Food in the Last Year: Never true   Transportation Needs: No Transportation Needs (11/14/2024)    PRAPARE - Transportation     Lack of Transportation (Medical): No     Lack of Transportation (Non-Medical): No   Physical Activity: Inactive (7/5/2022)    Exercise Vital Sign     Days of Exercise per Week: 0 days     Minutes of Exercise per Session: 0 min   Stress: Not on file   Social Connections: Not on file   Intimate Partner Violence: Not on file   Housing Stability: Low Risk  (11/14/2024)    Housing Stability Vital Sign     Unable to Pay for Housing in the Last Year: No     Number of Times Moved in the Last Year: 0     Homeless in the Last Year: No      Family History   Problem Relation Age of Onset    No Known Problems Mother     Heart disease Father     Heart disease Brother     Diabetes Brother     No Known Problems Daughter     No Known Problems Daughter     Diabetes Son     Alcohol abuse Neg Hx     Substance Abuse Neg Hx     Mental illness Neg Hx     Depression Neg Hx      Past Surgical History:   Procedure Laterality Date    BLADDER SURGERY      CHOLECYSTECTOMY  09/12/2023    HALLUX VALGUS CORRECTION Right     IN LAPAROSCOPY SURG CHOLECYSTECTOMY N/A 09/12/2023    Procedure: CHOLECYSTECTOMY LAPAROSCOPIC;  Surgeon: Albert Ramirez MD;  Location: [facility];  Service: General    TOTAL ABDOMINAL HYSTERECTOMY  1987    Cervical CA       Current Outpatient Medications:     albuterol (Ventolin HFA) 90 mcg/act inhaler, Inhale 2 puffs every 6 (six) hours as needed for wheezing, Disp: 18 g, Rfl: 3    Albuterol-Budesonide (Airsupra) 90-80 MCG/ACT AERO, Inhale 2 puffs every 6 (six) hours as needed (Asthma, wheezing, coughing), Disp: 10.7 g, Rfl: 3    ALPRAZolam (XANAX) 0.25 mg tablet, Take 1 tablet (0.25 mg total) by mouth daily as needed  "for anxiety, Disp: 10 tablet, Rfl: 0    amLODIPine-benazepril (LOTREL 5-10) 5-10 MG per capsule, Take 1 capsule by mouth daily, Disp: 90 capsule, Rfl: 1    ascorbic acid (VITAMIN C) 1000 MG tablet, , Disp: , Rfl:     buPROPion (WELLBUTRIN SR) 150 mg 12 hr tablet, Take 1 tablet (150 mg total) by mouth 2 (two) times a day, Disp: 180 tablet, Rfl: 1    cetirizine (ZyrTEC) 10 mg tablet, Take 10 mg by mouth daily, Disp: , Rfl:     Cholecalciferol (VITAMIN D3) 1000 units CAPS, Daily, Disp: , Rfl:     diphenhydrAMINE (BENADRYL) 25 mg capsule, Take 25 mg by mouth every 6 (six) hours as needed for itching, Disp: , Rfl:     Probiotic Product (Acidophilus) CHEW, , Disp: , Rfl:     zafirlukast (ACCOLATE) 20 MG tablet, Take 1 tablet (20 mg total) by mouth 2 (two) times a day, Disp: 180 tablet, Rfl: 3    EPINEPHrine (EPIPEN) 0.3 mg/0.3 mL SOAJ, Inject 0.3 mL (0.3 mg total) into a muscle once for 1 dose, Disp: 0.6 mL, Rfl: 0    mometasone (NASONEX) 50 mcg/act nasal spray, 2 sprays into each nostril daily, Disp: 17 g, Rfl: 11  Allergies   Allergen Reactions    Codeine Other (See Comments) and Tachycardia    Shellfish-Derived Products - Food Allergy Shortness Of Breath    Iodine - Food Allergy Other (See Comments)       Labs:     Chemistry        Component Value Date/Time    K 4.3 10/08/2024 0931    K 3.8 10/07/2020 0118     10/08/2024 0931     10/07/2020 0118    CO2 25 10/08/2024 0931    CO2 23 10/07/2020 0118    BUN 17 10/08/2024 0931    BUN 16 10/07/2020 0118    CREATININE 0.65 10/08/2024 0931    CREATININE 0.73 10/07/2020 0118        Component Value Date/Time    CALCIUM 8.9 10/08/2024 0931    CALCIUM 9.3 10/07/2020 0118    ALKPHOS 67 10/08/2024 0931    ALKPHOS 74 10/07/2020 0118    AST 16 10/08/2024 0931    AST 26 10/07/2020 0118    ALT 12 10/08/2024 0931    ALT 33 10/07/2020 0118            No results found for: \"CHOL\"  Lab Results   Component Value Date    HDL 79 10/08/2024    HDL 80 04/27/2023    HDL 82 11/20/2021 " "    Lab Results   Component Value Date    LDLCALC 122 (H) 10/08/2024    LDLCALC 132 (H) 04/27/2023    LDLCALC 142 (H) 11/20/2021     Lab Results   Component Value Date    TRIG 61 10/08/2024    TRIG 54 04/27/2023    TRIG 50 11/20/2021     No results found for: \"CHOLHDL\"    Imaging: No results found.    ECG:  Normal sinus rhythm first-degree AV block, left ventricular hypertrophy, nonspecific ST and T-wave abnormality      Review of Systems   Cardiovascular:  Negative for chest pain, irregular heartbeat, orthopnea, palpitations and paroxysmal nocturnal dyspnea.   Musculoskeletal:  Positive for arthritis and joint pain.   All other systems reviewed and are negative.      Vitals:    04/11/25 1020   BP: 128/80   Pulse:    SpO2:        Vitals:    04/11/25 0955   Weight: 80.1 kg (176 lb 8 oz)       Height: 5' 2\" (157.5 cm)   Body mass index is 32.28 kg/m².    Physical Exam:  General appearance:  Appears stated age, alert, well appearing and in no distress  HEENT:  PERRLA, EOMI, no scleral icterus, no conjunctival pallor  NECK:  Supple, No elevated JVP, no thyromegaly, no carotid bruits  HEART:  Regular rate and rhythm, normal S1/S2, no S3/S4, no murmur or rub  LUNGS:  Clear to auscultation bilaterally  ABDOMEN:  Soft, non-tender, positive bowel sounds, no rebound or guarding, no organomegaly   EXTREMITIES:  No edema  VASCULAR:  Normal pedal pulses   SKIN: No lesions or rashes on exposed skin  NEURO:  CN II-XII intact, no focal deficits    "

## 2025-04-11 ENCOUNTER — OFFICE VISIT (OUTPATIENT)
Dept: CARDIOLOGY CLINIC | Facility: CLINIC | Age: 85
End: 2025-04-11
Payer: COMMERCIAL

## 2025-04-11 VITALS
WEIGHT: 176.5 LBS | HEIGHT: 62 IN | SYSTOLIC BLOOD PRESSURE: 128 MMHG | DIASTOLIC BLOOD PRESSURE: 80 MMHG | HEART RATE: 72 BPM | OXYGEN SATURATION: 97 % | BODY MASS INDEX: 32.48 KG/M2

## 2025-04-11 DIAGNOSIS — E78.5 DYSLIPIDEMIA: ICD-10-CM

## 2025-04-11 DIAGNOSIS — I10 BENIGN ESSENTIAL HYPERTENSION: ICD-10-CM

## 2025-04-11 LAB
ATRIAL RATE: 72 BPM
P AXIS: 53 DEGREES
PR INTERVAL: 222 MS
QRS AXIS: -13 DEGREES
QRSD INTERVAL: 94 MS
QT INTERVAL: 410 MS
QTC INTERVAL: 448 MS
T WAVE AXIS: 126 DEGREES
VENTRICULAR RATE: 72 BPM

## 2025-04-11 PROCEDURE — 99214 OFFICE O/P EST MOD 30 MIN: CPT | Performed by: INTERNAL MEDICINE

## 2025-04-11 PROCEDURE — 93000 ELECTROCARDIOGRAM COMPLETE: CPT | Performed by: INTERNAL MEDICINE

## 2025-04-14 DIAGNOSIS — I10 BENIGN HYPERTENSION: ICD-10-CM

## 2025-04-15 ENCOUNTER — APPOINTMENT (OUTPATIENT)
Dept: PHYSICAL THERAPY | Facility: OTHER | Age: 85
End: 2025-04-15
Payer: COMMERCIAL

## 2025-04-15 RX ORDER — AMLODIPINE AND BENAZEPRIL HYDROCHLORIDE 5; 10 MG/1; MG/1
1 CAPSULE ORAL DAILY
Qty: 90 CAPSULE | Refills: 1 | Status: SHIPPED | OUTPATIENT
Start: 2025-04-15

## 2025-04-25 ENCOUNTER — APPOINTMENT (OUTPATIENT)
Dept: PHYSICAL THERAPY | Facility: OTHER | Age: 85
End: 2025-04-25
Payer: COMMERCIAL

## 2025-04-29 ENCOUNTER — EVALUATION (OUTPATIENT)
Dept: PHYSICAL THERAPY | Facility: OTHER | Age: 85
End: 2025-04-29
Payer: COMMERCIAL

## 2025-04-29 DIAGNOSIS — M25.511 ACUTE PAIN OF RIGHT SHOULDER: Primary | ICD-10-CM

## 2025-04-29 PROCEDURE — 97110 THERAPEUTIC EXERCISES: CPT | Performed by: PHYSICAL THERAPIST

## 2025-04-29 PROCEDURE — 97112 NEUROMUSCULAR REEDUCATION: CPT | Performed by: PHYSICAL THERAPIST

## 2025-04-29 PROCEDURE — 97140 MANUAL THERAPY 1/> REGIONS: CPT | Performed by: PHYSICAL THERAPIST

## 2025-04-29 NOTE — PROGRESS NOTES
PT Re-Evaluation     Today's date: 2025  Patient name: Margaret Lutz  : 1940  MRN: [id number]  Referring provider: Jamie Lagos DO  Dx:   Encounter Diagnosis     ICD-10-CM    1. Acute pain of right shoulder  M25.511                        Assessment  Impairments: abnormal coordination, abnormal muscle firing, abnormal or restricted ROM, activity intolerance, impaired physical strength, lacks appropriate home exercise program, pain with function and poor body mechanics  Symptom irritability: moderate    Assessment details: Margaret Lutz is a pleasant 84 y.o. female who presents with R shoulder pain after a fall on 24 on to a outstretch arm. She was walking behind bushes a the time. She reported she aggravated It again in the past week. She does have pain with dressing and IADLs. Difficulty with reaching OH. Since the IE she has improved ROM and less pain at rest. She can tolerate more IADL's. She is taking IBU for pain. She will be getting MRI of the shoulder. She does not want surgery. We discussed continuation of PT after MRI if surgery is not in the plan for her. She was agreeable to this. If she does have a RCT, and elects to treat it non operatively, we will be able to her mange her pain and compensation with deltiod.     2.4.24: she has increased AROM since the last RA. Pain continues but able to doing more around the house. She has not been good about her exercises. She returns to PT to get back on track. She contineus to be unable to do her own hair and is selective with how she dresses. She seems to be doing well with conservative care. She would like to avoid surgery if possible.        4.29: she has been attending PT 1x a week for several weeks. She had a injection since the last RA. This was helpful. She is still hopeful she can avoid surgery which it does seem she is copping well with conservative care. She does have improved ROM. She notes about a 15-25% improvement. She is able to do  more around the house and IADL's requires less assistance from her daughter.   HEP issued and POC reviewed.     Understanding of Dx/Px/POC: good     Prognosis: good    Goals  Short Term:  Pt will report decreased levels of pain by at least 2 subjective ratings in 4 weeks  Pt will demonstrate improved ROM by at least 10 degrees in 4 weeks  Pt will demonstrate improved strength by 1/2 grade  Long Term:   Pt will be independent in their HEP in 8 weeks  Pt will be be pain free with IADL's  Pt will demonstrate improved FOTO, > 80         Plan  Patient would benefit from: skilled physical therapy  Planned modality interventions: thermotherapy: hydrocollator packs    Planned therapy interventions: activity modification, joint mobilization, manual therapy, motor coordination training, neuromuscular re-education, patient education, self care, therapeutic activities, therapeutic exercise, graded activity and home exercise program    Frequency: 2x week  Treatment plan discussed with: patient  Plan details: Prognosis above is given PT services 2x/week tapering to 1x/week over the next 3 months and home program adherence.      Subjective Evaluation    Patient Goals  Patient goals for therapy: decreased pain, independence with ADLs/IADLs, return to sport/leisure activities, increased motion and increased strength    Pain  At best pain ratin  At worst pain ratin      Objective     General Comments:      Shoulder Comments   3/5 ER. + drop arm and + ER lag sign. IR 4/5   AROM Flex 85 degrees  now able to reach the top of her head/   PROM 160  ER 75 IR 55      She does tolerate light strengthing well.             Precautions:       Manuals 10/184 11.8           PROM  MJ  MJ            AAROM flex  10x2  10x2                                      Neuro Re-Ed             [place] FE  10x2  10x2            Isometrics  Flex ext and IR 5''x10  5''x10            Table slide inclide 10x2  10x2            Std row  10x2  10x2             Supine B/L scap punch  10x                                      Ther Ex             Pullies  3min  3min            Table slide  5''x10x2  5''x2min                                                                                          Ther Activity                                       Gait Training                                       Modalities

## 2025-05-06 ENCOUNTER — OFFICE VISIT (OUTPATIENT)
Dept: PHYSICAL THERAPY | Facility: OTHER | Age: 85
End: 2025-05-06
Payer: COMMERCIAL

## 2025-05-06 DIAGNOSIS — M25.511 ACUTE PAIN OF RIGHT SHOULDER: Primary | ICD-10-CM

## 2025-05-06 PROCEDURE — 97112 NEUROMUSCULAR REEDUCATION: CPT | Performed by: PHYSICAL THERAPIST

## 2025-05-06 PROCEDURE — 97140 MANUAL THERAPY 1/> REGIONS: CPT | Performed by: PHYSICAL THERAPIST

## 2025-05-07 NOTE — PROGRESS NOTES
Daily Note     Today's date: 2025  Patient name: Margaret Lutz  : 1940  MRN: [id number]  Referring provider: Jamie Lagos DO  Dx:   Encounter Diagnosis     ICD-10-CM    1. Acute pain of right shoulder  M25.511                      Subjective: sore from the weekend unsure what if anything she did.        Objective: See treatment diary below      Assessment: Tolerated treatment well. Patient demonstrated fatigue post treatment      Plan: Continue per plan of care.      Precautions:       Manuals 3/4  3.11 3.27 4.8 5.6       PROM  MJ  MJ MJ  MJ MJ       AAROM flex  10x2  10                                  Neuro Re-Ed            [place] FE  10x3 10x3  10x2  10x2  10x2        Isometrics Flex   5''x10  5''x10  5;''x10  5''x10  5''x10        Table slide inclide 10x2  #2 10x2           Std row  10x2 #2  #3 10x2  10x2  #2 10x2  #2 10x2        Supine B/L scap punch 10x 2  10x2  10x2  10x2  10x2        TB row 10x3 otb Gtb 10x3   Gtb 10x2  Gtb 10x32        TB LPD 10x2  Gtb 10x3   Gtb 10x3  10x3        Ther Ex            Pullies  3min  3min  3min  3min  3min        Table slide  5''x2min  5['x2min   10''x2min  10''x2min        Slide up iclide  #2 10x2 45 degree                                                                        Ther Activity                                    Gait Training                                    Modalities

## 2025-05-08 ENCOUNTER — DOCUMENTATION (OUTPATIENT)
Dept: ADMINISTRATIVE | Facility: OTHER | Age: 85
End: 2025-05-08

## 2025-05-08 NOTE — PROGRESS NOTES
05/08/25 10:31 AM    Osteoporosis Management Post Fracture outreach is not required; there is an active DEXA screening order on file.    Thank you.  Richa Stinson MA  [facility]

## 2025-05-13 ENCOUNTER — OFFICE VISIT (OUTPATIENT)
Dept: PHYSICAL THERAPY | Facility: OTHER | Age: 85
End: 2025-05-13
Payer: COMMERCIAL

## 2025-05-13 DIAGNOSIS — M25.511 ACUTE PAIN OF RIGHT SHOULDER: Primary | ICD-10-CM

## 2025-05-13 PROCEDURE — 97140 MANUAL THERAPY 1/> REGIONS: CPT | Performed by: PHYSICAL THERAPIST

## 2025-05-13 PROCEDURE — 97112 NEUROMUSCULAR REEDUCATION: CPT | Performed by: PHYSICAL THERAPIST

## 2025-05-15 NOTE — PROGRESS NOTES
Daily Note     Today's date: 5/15/2025  Patient name: Margaret Lutz  : 1940  MRN: [id number]  Referring provider: Jamie Lagos DO  Dx:   Encounter Diagnosis     ICD-10-CM    1. Acute pain of right shoulder  M25.511                      Subjective:       Objective: See treatment diary below      Assessment: Tolerated treatment well. Patient demonstrated fatigue post treatment      Plan: Continue per plan of care.      Precautions:       Manuals 3/4  3.11 3.27 4.8 5.14       PROM  MJ  MJ MJ  MJ MJ       AAROM flex  10x2  10                                  Neuro Re-Ed            [place] FE  10x3 10x3  10x2  10x2  10x2        Isometrics Flex   5''x10  5''x10  5;''x10  5''x10  5''x10        Table slide inclide 10x2  #2 10x2           Std row  10x2 #2  #3 10x2  10x2  #2 10x2  #2 10x2        Supine B/L scap punch 10x 2  10x2  10x2  10x2  10x2        TB row 10x3 otb Gtb 10x3   Gtb 10x2  Gtb 10x32        TB LPD 10x2  Gtb 10x3   Gtb 10x3  10x3        Ther Ex            Pullies  3min  3min  3min  3min  3min        Table slide  5''x2min  5['x2min   10''x2min  10''x2min        Slide up iclide  #2 10x2 45 degree                                                                        Ther Activity                                    Gait Training                                    Modalities

## 2025-05-16 ENCOUNTER — OFFICE VISIT (OUTPATIENT)
Dept: INTERNAL MEDICINE CLINIC | Facility: CLINIC | Age: 85
End: 2025-05-16
Payer: COMMERCIAL

## 2025-05-16 VITALS
BODY MASS INDEX: 32.9 KG/M2 | DIASTOLIC BLOOD PRESSURE: 80 MMHG | HEART RATE: 80 BPM | WEIGHT: 178.8 LBS | HEIGHT: 62 IN | OXYGEN SATURATION: 98 % | SYSTOLIC BLOOD PRESSURE: 128 MMHG | TEMPERATURE: 96.3 F

## 2025-05-16 DIAGNOSIS — F41.9 ANXIETY AND DEPRESSION: ICD-10-CM

## 2025-05-16 DIAGNOSIS — F32.A ANXIETY AND DEPRESSION: ICD-10-CM

## 2025-05-16 DIAGNOSIS — I10 ESSENTIAL HYPERTENSION: Primary | ICD-10-CM

## 2025-05-16 DIAGNOSIS — J45.30 MILD PERSISTENT ASTHMA WITHOUT COMPLICATION: ICD-10-CM

## 2025-05-16 DIAGNOSIS — E78.5 DYSLIPIDEMIA: ICD-10-CM

## 2025-05-16 PROCEDURE — G2211 COMPLEX E/M VISIT ADD ON: HCPCS | Performed by: NURSE PRACTITIONER

## 2025-05-16 PROCEDURE — 99214 OFFICE O/P EST MOD 30 MIN: CPT | Performed by: NURSE PRACTITIONER

## 2025-05-16 RX ORDER — ACETAMINOPHEN 500 MG
500 TABLET ORAL EVERY 6 HOURS PRN
COMMUNITY

## 2025-05-16 RX ORDER — ALPRAZOLAM 0.25 MG
0.25 TABLET ORAL DAILY PRN
Qty: 10 TABLET | Refills: 0 | Status: SHIPPED | OUTPATIENT
Start: 2025-05-16

## 2025-05-16 NOTE — PROGRESS NOTES
"Name: Margaret Lutz      : 1940      MRN: [id number]  Encounter Provider: PO Mulligan  Encounter Date: 2025   Encounter department: [facility] INTERNAL MEDICINE  :  Assessment & Plan  Essential hypertension  Stable.  Continue amlodipine-benazepril.          Mild persistent asthma without complication  Stable.  On albuterol as needed.          Anxiety and depression  Stable.  On wellbutrin.   Takes xanax as needed. Rare use.   Orders:    ALPRAZolam (XANAX) 0.25 mg tablet; Take 1 tablet (0.25 mg total) by mouth daily as needed for anxiety    Dyslipidemia  On a low fat diet.                 History of Present Illness   Margaret is here today for follow up.  She is doing ok.  She tore her right rotator cuff. She is in physical therapy.  She has no other concerns today.  She may need to switch PCP's, one closer to her home since she doesn't feel comfortable driving far.       Review of Systems   Constitutional:  Negative for activity change, appetite change, fatigue and unexpected weight change.   Eyes:  Negative for visual disturbance.   Respiratory:  Negative for cough and shortness of breath.    Cardiovascular:  Negative for chest pain, palpitations and leg swelling.   Gastrointestinal:  Negative for abdominal pain, constipation and diarrhea.   Genitourinary:  Negative for difficulty urinating.   Musculoskeletal:  Positive for arthralgias.   Neurological:  Negative for dizziness, light-headedness and headaches.   Psychiatric/Behavioral:  Negative for dysphoric mood and sleep disturbance. The patient is not nervous/anxious.        Objective   /80   Pulse 80   Temp (!) 96.3 °F (35.7 °C)   Ht 5' 2\" (1.575 m)   Wt 81.1 kg (178 lb 12.8 oz)   SpO2 98%   BMI 32.70 kg/m²      Physical Exam  Vitals reviewed.   Constitutional:       Appearance: Normal appearance. She is well-developed.   HENT:      Head: Normocephalic and atraumatic.     Eyes:      Conjunctiva/sclera: Conjunctivae " normal.       Cardiovascular:      Rate and Rhythm: Normal rate and regular rhythm.      Heart sounds: Normal heart sounds.   Pulmonary:      Effort: Pulmonary effort is normal.      Breath sounds: Normal breath sounds.   Abdominal:      General: Bowel sounds are normal.      Palpations: Abdomen is soft.     Musculoskeletal:         General: Normal range of motion.      Right lower leg: Edema present.      Left lower leg: Edema present.      Comments: Mild pitting edema.      Skin:     General: Skin is warm and dry.     Neurological:      Mental Status: She is alert and oriented to person, place, and time.     Psychiatric:         Mood and Affect: Mood normal.         Behavior: Behavior normal.

## 2025-05-16 NOTE — ASSESSMENT & PLAN NOTE
Stable.  On wellbutrin.   Takes xanax as needed. Rare use.   Orders:    ALPRAZolam (XANAX) 0.25 mg tablet; Take 1 tablet (0.25 mg total) by mouth daily as needed for anxiety

## 2025-05-20 ENCOUNTER — APPOINTMENT (OUTPATIENT)
Dept: PHYSICAL THERAPY | Facility: OTHER | Age: 85
End: 2025-05-20
Payer: COMMERCIAL

## 2025-05-27 ENCOUNTER — OFFICE VISIT (OUTPATIENT)
Dept: PHYSICAL THERAPY | Facility: OTHER | Age: 85
End: 2025-05-27
Payer: COMMERCIAL

## 2025-05-27 DIAGNOSIS — M25.511 ACUTE PAIN OF RIGHT SHOULDER: Primary | ICD-10-CM

## 2025-05-27 PROCEDURE — 97140 MANUAL THERAPY 1/> REGIONS: CPT | Performed by: PHYSICAL THERAPIST

## 2025-05-27 PROCEDURE — 97112 NEUROMUSCULAR REEDUCATION: CPT | Performed by: PHYSICAL THERAPIST

## 2025-05-27 NOTE — PROGRESS NOTES
Daily Note     Today's date: 2025  Patient name: Margaret Lutz  : 1940  MRN: [id number]  Referring provider: Jamie Lagos DO  Dx:   Encounter Diagnosis     ICD-10-CM    1. Acute pain of right shoulder  M25.511                      Subjective: some increased pain over the weekend.       Objective: See treatment diary below      Assessment: Tolerated treatment well. Patient demonstrated fatigue post treatment      Plan: Continue per plan of care.      Precautions:       Manuals 3/4  3.11 3.27 4.8 5.27       PROM  MJ  MJ MJ  MJ MJ       AAROM flex  10x2  10                                  Neuro Re-Ed            [place] FE  10x3 10x3  10x2  10x2  10x2        Isometrics Flex   5''x10  5''x10  5;''x10  5''x10  5''x10        Table slide inclide 10x2  #2 10x2           Std row  10x2 #2  #3 10x2  10x2  #2 10x2  #2 10x2        Supine B/L scap punch 10x 2  10x2  10x2  10x2  10x2        TB row 10x3 otb Gtb 10x3   Gtb 10x2  Gtb 10x32        TB LPD 10x2  Gtb 10x3   Gtb 10x3  10x3        Ther Ex            Pullies  3min  3min  3min  3min  3min        Table slide  5''x2min  5['x2min   10''x2min  10''x2min        Slide up iclide  #2 10x2 45 degree                                                                        Ther Activity                                    Gait Training                                    Modalities

## 2025-06-03 ENCOUNTER — APPOINTMENT (OUTPATIENT)
Dept: PHYSICAL THERAPY | Facility: OTHER | Age: 85
End: 2025-06-03
Payer: COMMERCIAL

## 2025-06-10 ENCOUNTER — APPOINTMENT (OUTPATIENT)
Dept: PHYSICAL THERAPY | Facility: OTHER | Age: 85
End: 2025-06-10
Payer: COMMERCIAL

## 2025-06-17 ENCOUNTER — OFFICE VISIT (OUTPATIENT)
Dept: PHYSICAL THERAPY | Facility: OTHER | Age: 85
End: 2025-06-17
Payer: COMMERCIAL

## 2025-06-17 DIAGNOSIS — M25.511 ACUTE PAIN OF RIGHT SHOULDER: Primary | ICD-10-CM

## 2025-06-17 PROCEDURE — 97140 MANUAL THERAPY 1/> REGIONS: CPT | Performed by: PHYSICAL THERAPIST

## 2025-06-17 PROCEDURE — 97112 NEUROMUSCULAR REEDUCATION: CPT | Performed by: PHYSICAL THERAPIST

## 2025-06-18 NOTE — PROGRESS NOTES
Daily Note     Today's date: 2025  Patient name: Margaret Lutz  : 1940  MRN: [id number]  Referring provider: Jamie Lagos DO  Dx:   Encounter Diagnosis     ICD-10-CM    1. Acute pain of right shoulder  M25.511                      Subjective: tolerated treatment well today. Some stiffness noted. She has not been to PT in 3 weeks.        Objective: See treatment diary below      Assessment: Tolerated treatment well. Patient demonstrated fatigue post treatment      Plan: Continue per plan of care.      Precautions:       Manuals 6.17       PROM  MJ       AAROM flex                         Neuro Re-Ed        [place] FE  10x2        Isometrics Flex   5''x10        Table slide inclide        Std row  #2 10x2        Supine B/L scap punch 10x2        TB row Gtb 10x32        TB LPD 10x3        Ther Ex        Pullies  3min        Table slide  10''x2min        Slide up iclide                                                 Ther Activity                        Gait Training                        Modalities

## 2025-06-24 ENCOUNTER — OFFICE VISIT (OUTPATIENT)
Dept: PHYSICAL THERAPY | Facility: OTHER | Age: 85
End: 2025-06-24
Payer: COMMERCIAL

## 2025-06-24 DIAGNOSIS — M25.511 ACUTE PAIN OF RIGHT SHOULDER: Primary | ICD-10-CM

## 2025-06-24 PROCEDURE — 97112 NEUROMUSCULAR REEDUCATION: CPT | Performed by: PHYSICAL THERAPIST

## 2025-06-24 PROCEDURE — 97140 MANUAL THERAPY 1/> REGIONS: CPT | Performed by: PHYSICAL THERAPIST

## 2025-06-24 NOTE — PROGRESS NOTES
Daily Note     Today's date: 2025  Patient name: Margaret Lutz  : 1940  MRN: [id number]  Referring provider: Jamie Lagos DO  Dx:   Encounter Diagnosis     ICD-10-CM    1. Acute pain of right shoulder  M25.511                      Subjective: progressing well.        Objective: See treatment diary below      Assessment: Tolerated treatment well. Patient demonstrated fatigue post treatment      Plan: Continue per plan of care.      Precautions:       Manuals 6.17 6.24      PROM  MJ MJ       AAROM flex                         Neuro Re-Ed        [place] FE  10x2  10x2       Isometrics Flex   5''x10  5''x10       Table slide inclide        Std row  #2 10x2  #2 10x2       Supine B/L scap punch 10x2  10x2       TB row Gtb 10x32  Gtb 10x3       TB LPD 10x3  10x3       Ther Ex        Pullies  3min  3min       Table slide  10''x2min  10''x32in      Slide up iclide                                                 Ther Activity                        Gait Training                        Modalities

## 2025-07-01 ENCOUNTER — OFFICE VISIT (OUTPATIENT)
Dept: PHYSICAL THERAPY | Facility: OTHER | Age: 85
End: 2025-07-01
Payer: COMMERCIAL

## 2025-07-01 DIAGNOSIS — M25.511 ACUTE PAIN OF RIGHT SHOULDER: Primary | ICD-10-CM

## 2025-07-01 PROCEDURE — 97140 MANUAL THERAPY 1/> REGIONS: CPT | Performed by: PHYSICAL THERAPIST

## 2025-07-01 PROCEDURE — 97112 NEUROMUSCULAR REEDUCATION: CPT | Performed by: PHYSICAL THERAPIST

## 2025-07-01 NOTE — PROGRESS NOTES
Daily Note     Today's date: 2025  Patient name: Margaret Lutz  : 1940  MRN: [id number]  Referring provider: Jamie Lagos DO  Dx: No diagnosis found.               Subjective: able to rasie arm to about 90degrees now.       Objective: See treatment diary below      Assessment: Tolerated treatment well. Patient demonstrated fatigue post treatment      Plan: Continue per plan of care.      Precautions:       Manuals 6.17 6.24 7.1     PROM  MJ MJ  MJ     AAROM flex                         Neuro Re-Ed        [place] FE  10x2  10x2  10x2      Isometrics Flex   5''x10  5''x10  Ball into wall 10x2 green      Table slide inclide        Std row  #2 10x2  #2 10x2  #2 10x2      Supine B/L scap punch 10x2  10x2  10x2      TB row Gtb 10x32  Gtb 10x3  Gtb 10x2      TB LPD 10x3  10x3  Gtb 10x3      Ther Ex        Pullies  3min  3min  3min      Table slide  10''x2min  10''x32in 10''x10      Slide up iclide                                                 Ther Activity                        Gait Training                        Modalities

## 2025-07-08 ENCOUNTER — OFFICE VISIT (OUTPATIENT)
Dept: PHYSICAL THERAPY | Facility: OTHER | Age: 85
End: 2025-07-08
Payer: COMMERCIAL

## 2025-07-08 DIAGNOSIS — M25.511 ACUTE PAIN OF RIGHT SHOULDER: Primary | ICD-10-CM

## 2025-07-08 PROCEDURE — 97140 MANUAL THERAPY 1/> REGIONS: CPT | Performed by: PHYSICAL THERAPIST

## 2025-07-09 NOTE — PROGRESS NOTES
Daily Note     Today's date: 2025  Patient name: Margaret Lutz  : 1940  MRN: [id number]  Referring provider: Jamie Lagos DO  Dx:   Encounter Diagnosis     ICD-10-CM    1. Acute pain of right shoulder  M25.511                      Subjective: able to rasie arm to about 90degrees now. Swe discussed taking a few weeks off form PT and see how the shoulder does. Likley D/c unless hse has a  set back.       Objective: See treatment diary below      Assessment: Tolerated treatment well. Patient demonstrated fatigue post treatment      Plan: Continue per plan of care.      Precautions:       Manuals 6.17 6.24 7.8     PROM  MJ MJ  MJ     AAROM flex                         Neuro Re-Ed        [place] FE  10x2  10x2  10x2      Isometrics Flex   5''x10  5''x10  Ball into wall 10x2 green      Table slide inclide        Std row  #2 10x2  #2 10x2  #2 10x2      Supine B/L scap punch 10x2  10x2  10x2      TB row Gtb 10x32  Gtb 10x3  Gtb 10x2      TB LPD 10x3  10x3  Gtb 10x3      Ther Ex        Pullies  3min  3min  3min      Table slide  10''x2min  10''x32in 10''x10      Slide up iclide                                                 Ther Activity                        Gait Training                        Modalities

## 2025-07-14 DIAGNOSIS — F32.A ANXIETY AND DEPRESSION: ICD-10-CM

## 2025-07-14 DIAGNOSIS — F41.9 ANXIETY AND DEPRESSION: ICD-10-CM

## 2025-07-16 RX ORDER — BUPROPION HYDROCHLORIDE 150 MG/1
150 TABLET, EXTENDED RELEASE ORAL 2 TIMES DAILY
Qty: 180 TABLET | Refills: 1 | Status: SHIPPED | OUTPATIENT
Start: 2025-07-16

## 2025-07-22 ENCOUNTER — APPOINTMENT (OUTPATIENT)
Dept: PHYSICAL THERAPY | Facility: OTHER | Age: 85
End: 2025-07-22
Payer: COMMERCIAL

## 2025-08-18 ENCOUNTER — OFFICE VISIT (OUTPATIENT)
Dept: FAMILY MEDICINE CLINIC | Facility: CLINIC | Age: 85
End: 2025-08-18
Payer: COMMERCIAL

## 2025-08-18 VITALS
SYSTOLIC BLOOD PRESSURE: 132 MMHG | TEMPERATURE: 97.6 F | HEIGHT: 62 IN | HEART RATE: 75 BPM | DIASTOLIC BLOOD PRESSURE: 72 MMHG | BODY MASS INDEX: 33.53 KG/M2 | OXYGEN SATURATION: 97 % | WEIGHT: 182.2 LBS

## 2025-08-18 DIAGNOSIS — M79.89 LOCALIZED SWELLING OF LOWER EXTREMITY: Primary | ICD-10-CM

## 2025-08-18 DIAGNOSIS — F41.9 ANXIETY AND DEPRESSION: ICD-10-CM

## 2025-08-18 DIAGNOSIS — E78.5 DYSLIPIDEMIA: ICD-10-CM

## 2025-08-18 DIAGNOSIS — Z91.013 SHELLFISH ALLERGY: ICD-10-CM

## 2025-08-18 DIAGNOSIS — F32.A ANXIETY AND DEPRESSION: ICD-10-CM

## 2025-08-18 DIAGNOSIS — I10 ESSENTIAL HYPERTENSION: ICD-10-CM

## 2025-08-18 PROCEDURE — 99204 OFFICE O/P NEW MOD 45 MIN: CPT | Performed by: FAMILY MEDICINE

## (undated) DEVICE — TUBING SMOKE EVAC W/FILTRATION DEVICE PLUMEPORT ACTIV

## (undated) DEVICE — ADHESIVE SKIN HIGH VISCOSITY EXOFIN 1ML

## (undated) DEVICE — ELECTRODE LAP L WIRE E-Z CLEAN 33CM -0100

## (undated) DEVICE — INSUFFLATION NEEDLE TO ESTABLISH PNEUMOPERITONEUM.: Brand: INSUFFLATION NEEDLE

## (undated) DEVICE — PENCIL ELECTROSURG E-Z CLEAN -0035H

## (undated) DEVICE — Device: Brand: OMNICLOSE TROCAR SITE CLOSURE DEVICE

## (undated) DEVICE — TROCAR: Brand: KII® SLEEVE

## (undated) DEVICE — LIGAMAX 5 MM ENDOSCOPIC MULTIPLE CLIP APPLIER: Brand: LIGAMAX

## (undated) DEVICE — GLOVE SRG BIOGEL ECLIPSE 7.5

## (undated) DEVICE — TROCAR: Brand: KII FIOS FIRST ENTRY

## (undated) DEVICE — INTENDED FOR TISSUE SEPARATION, AND OTHER PROCEDURES THAT REQUIRE A SHARP SURGICAL BLADE TO PUNCTURE OR CUT.: Brand: BARD-PARKER SAFETY BLADES SIZE 11, STERILE

## (undated) DEVICE — PACK PBDS LAP CHOLE RF

## (undated) DEVICE — SUT ETHIBOND 0 SH 30 IN X834H

## (undated) DEVICE — SUT MONOCRYL 4-0 PS-2 18 IN Y496G